# Patient Record
Sex: FEMALE | Race: BLACK OR AFRICAN AMERICAN | Employment: OTHER | URBAN - METROPOLITAN AREA
[De-identification: names, ages, dates, MRNs, and addresses within clinical notes are randomized per-mention and may not be internally consistent; named-entity substitution may affect disease eponyms.]

---

## 2017-01-05 ENCOUNTER — OP HISTORICAL/CONVERTED ENCOUNTER (OUTPATIENT)
Dept: OTHER | Age: 62
End: 2017-01-05

## 2017-04-04 ENCOUNTER — OP HISTORICAL/CONVERTED ENCOUNTER (OUTPATIENT)
Dept: OTHER | Age: 62
End: 2017-04-04

## 2017-12-07 ENCOUNTER — OP HISTORICAL/CONVERTED ENCOUNTER (OUTPATIENT)
Dept: OTHER | Age: 62
End: 2017-12-07

## 2017-12-13 ENCOUNTER — OP HISTORICAL/CONVERTED ENCOUNTER (OUTPATIENT)
Dept: OTHER | Age: 62
End: 2017-12-13

## 2018-04-26 ENCOUNTER — OP HISTORICAL/CONVERTED ENCOUNTER (OUTPATIENT)
Dept: OTHER | Age: 63
End: 2018-04-26

## 2019-12-17 ENCOUNTER — OP HISTORICAL/CONVERTED ENCOUNTER (OUTPATIENT)
Dept: OTHER | Age: 64
End: 2019-12-17

## 2020-12-16 ENCOUNTER — TRANSCRIBE ORDER (OUTPATIENT)
Dept: SCHEDULING | Age: 65
End: 2020-12-16

## 2020-12-16 DIAGNOSIS — Z12.31 BREAST CANCER SCREENING BY MAMMOGRAM: Primary | ICD-10-CM

## 2021-01-29 ENCOUNTER — HOSPITAL ENCOUNTER (OUTPATIENT)
Dept: MAMMOGRAPHY | Age: 66
Discharge: HOME | End: 2021-01-29
Attending: INTERNAL MEDICINE
Payer: MEDICARE

## 2021-01-29 DIAGNOSIS — Z12.31 BREAST CANCER SCREENING BY MAMMOGRAM: ICD-10-CM

## 2021-01-29 PROCEDURE — 77067 SCR MAMMO BI INCL CAD: CPT

## 2021-05-18 ENCOUNTER — HOSPITAL ENCOUNTER (EMERGENCY)
Age: 66
Discharge: ANOTHER HOSPITAL | End: 2021-05-18
Attending: EMERGENCY MEDICINE
Payer: MEDICARE

## 2021-05-18 ENCOUNTER — APPOINTMENT (OUTPATIENT)
Dept: GENERAL RADIOLOGY | Age: 66
End: 2021-05-18
Attending: EMERGENCY MEDICINE
Payer: MEDICARE

## 2021-05-18 ENCOUNTER — HOSPITAL ENCOUNTER (INPATIENT)
Age: 66
LOS: 3 days | Discharge: HOME | DRG: 247 | End: 2021-05-21
Attending: INTERNAL MEDICINE | Admitting: FAMILY MEDICINE
Payer: MEDICARE

## 2021-05-18 VITALS
OXYGEN SATURATION: 99 % | WEIGHT: 158 LBS | RESPIRATION RATE: 20 BRPM | BODY MASS INDEX: 24.8 KG/M2 | TEMPERATURE: 97.4 F | SYSTOLIC BLOOD PRESSURE: 193 MMHG | HEIGHT: 67 IN | HEART RATE: 72 BPM | DIASTOLIC BLOOD PRESSURE: 90 MMHG

## 2021-05-18 DIAGNOSIS — I21.3 ACUTE ST ELEVATION MYOCARDIAL INFARCTION (STEMI), UNSPECIFIED ARTERY (HCC): Primary | ICD-10-CM

## 2021-05-18 DIAGNOSIS — I21.11 ST ELEVATION MYOCARDIAL INFARCTION INVOLVING RIGHT CORONARY ARTERY (HCC): ICD-10-CM

## 2021-05-18 PROBLEM — I21.19 STEMI INVOLVING OTH CORONARY ARTERY OF INFERIOR WALL (HCC): Status: ACTIVE | Noted: 2021-05-18

## 2021-05-18 LAB
ALBUMIN SERPL-MCNC: 3.8 G/DL (ref 3.5–5)
ALBUMIN/GLOB SERPL: 0.8 {RATIO} (ref 1.1–2.2)
ALP SERPL-CCNC: 153 U/L (ref 45–117)
ALT SERPL-CCNC: 22 U/L (ref 12–78)
ANION GAP SERPL CALC-SCNC: 7 MMOL/L (ref 5–15)
AST SERPL W P-5'-P-CCNC: 38 U/L (ref 15–37)
BASOPHILS # BLD: 0 K/UL (ref 0–0.2)
BASOPHILS NFR BLD: 0 % (ref 0–2.5)
BILIRUB SERPL-MCNC: 0.7 MG/DL (ref 0.2–1)
BUN SERPL-MCNC: 17 MG/DL (ref 6–20)
BUN/CREAT SERPL: 14 (ref 12–20)
CA-I BLD-MCNC: 9.3 MG/DL (ref 8.5–10.1)
CHLORIDE SERPL-SCNC: 103 MMOL/L (ref 97–108)
CO2 SERPL-SCNC: 29 MMOL/L (ref 21–32)
CREAT SERPL-MCNC: 1.23 MG/DL (ref 0.55–1.02)
EOSINOPHIL # BLD: 0 K/UL (ref 0–0.7)
EOSINOPHIL NFR BLD: 0 % (ref 0.9–2.9)
ERYTHROCYTE [DISTWIDTH] IN BLOOD BY AUTOMATED COUNT: 14.1 % (ref 11.5–14.5)
GLOBULIN SER CALC-MCNC: 4.6 G/DL (ref 2–4)
GLUCOSE BLD STRIP.AUTO-MCNC: 211 MG/DL (ref 65–117)
GLUCOSE SERPL-MCNC: 268 MG/DL (ref 65–100)
HCT VFR BLD AUTO: 37.9 % (ref 36–46)
HGB BLD-MCNC: 12.3 G/DL (ref 13.5–17.5)
INR PPP: 1 (ref 0.9–1.1)
LYMPHOCYTES # BLD: 1 K/UL (ref 1–4.8)
LYMPHOCYTES NFR BLD: 10 % (ref 20.5–51.1)
MCH RBC QN AUTO: 28.2 PG (ref 31–34)
MCHC RBC AUTO-ENTMCNC: 32.5 G/DL (ref 31–36)
MCV RBC AUTO: 86.5 FL (ref 80–100)
MONOCYTES # BLD: 0.4 K/UL (ref 0.2–2.4)
MONOCYTES NFR BLD: 4 % (ref 1.7–9.3)
MRSA DNA SPEC QL NAA+PROBE: NOT DETECTED
NEUTS SEG # BLD: 9.5 K/UL (ref 1.8–7.7)
NEUTS SEG NFR BLD: 86 % (ref 42–75)
NRBC # BLD: 0 K/UL
NRBC BLD-RTO: 0 PER 100 WBC
PERFORMED BY, TECHID: ABNORMAL
PLATELET # BLD AUTO: 293 K/UL (ref 150–400)
PMV BLD AUTO: 8.8 FL (ref 6.5–11.5)
POTASSIUM SERPL-SCNC: 3.9 MMOL/L (ref 3.5–5.1)
PROT SERPL-MCNC: 8.4 G/DL (ref 6.4–8.2)
PROTHROMBIN TIME: 9.9 SEC (ref 9–11.1)
RBC # BLD AUTO: 4.38 M/UL (ref 4.5–5.9)
SODIUM SERPL-SCNC: 139 MMOL/L (ref 136–145)
TROPONIN I SERPL-MCNC: <0.05 NG/ML
WBC # BLD AUTO: 11 K/UL (ref 4.4–11.3)

## 2021-05-18 PROCEDURE — 74011000636 HC RX REV CODE- 636: Performed by: INTERNAL MEDICINE

## 2021-05-18 PROCEDURE — 77030016700 HC CATH ANGI DX INFN2 CARD -B: Performed by: INTERNAL MEDICINE

## 2021-05-18 PROCEDURE — 96374 THER/PROPH/DIAG INJ IV PUSH: CPT

## 2021-05-18 PROCEDURE — C1874 STENT, COATED/COV W/DEL SYS: HCPCS | Performed by: INTERNAL MEDICINE

## 2021-05-18 PROCEDURE — 74011250637 HC RX REV CODE- 250/637: Performed by: EMERGENCY MEDICINE

## 2021-05-18 PROCEDURE — 84484 ASSAY OF TROPONIN QUANT: CPT

## 2021-05-18 PROCEDURE — 77030013516 HC DEV INFL ANGI MRTM -B: Performed by: INTERNAL MEDICINE

## 2021-05-18 PROCEDURE — 74011250636 HC RX REV CODE- 250/636

## 2021-05-18 PROCEDURE — C1887 CATHETER, GUIDING: HCPCS | Performed by: INTERNAL MEDICINE

## 2021-05-18 PROCEDURE — 92941 PRQ TRLML REVSC TOT OCCL AMI: CPT | Performed by: INTERNAL MEDICINE

## 2021-05-18 PROCEDURE — C1769 GUIDE WIRE: HCPCS | Performed by: INTERNAL MEDICINE

## 2021-05-18 PROCEDURE — 74011250636 HC RX REV CODE- 250/636: Performed by: EMERGENCY MEDICINE

## 2021-05-18 PROCEDURE — 74011250637 HC RX REV CODE- 250/637

## 2021-05-18 PROCEDURE — 93458 L HRT ARTERY/VENTRICLE ANGIO: CPT | Performed by: INTERNAL MEDICINE

## 2021-05-18 PROCEDURE — 75810000275 HC EMERGENCY DEPT VISIT NO LEVEL OF CARE

## 2021-05-18 PROCEDURE — 99153 MOD SED SAME PHYS/QHP EA: CPT | Performed by: INTERNAL MEDICINE

## 2021-05-18 PROCEDURE — 77030008542 HC TBNG MON PRSS EDWD -A: Performed by: INTERNAL MEDICINE

## 2021-05-18 PROCEDURE — 71045 X-RAY EXAM CHEST 1 VIEW: CPT

## 2021-05-18 PROCEDURE — 77030003394 HC NDL ART COOK -A: Performed by: INTERNAL MEDICINE

## 2021-05-18 PROCEDURE — C1725 CATH, TRANSLUMIN NON-LASER: HCPCS | Performed by: INTERNAL MEDICINE

## 2021-05-18 PROCEDURE — 4A023N7 MEASUREMENT OF CARDIAC SAMPLING AND PRESSURE, LEFT HEART, PERCUTANEOUS APPROACH: ICD-10-PCS | Performed by: INTERNAL MEDICINE

## 2021-05-18 PROCEDURE — C1894 INTRO/SHEATH, NON-LASER: HCPCS | Performed by: INTERNAL MEDICINE

## 2021-05-18 PROCEDURE — 74011250636 HC RX REV CODE- 250/636: Performed by: INTERNAL MEDICINE

## 2021-05-18 PROCEDURE — 4A033BC MEASUREMENT OF ARTERIAL PRESSURE, CORONARY, PERCUTANEOUS APPROACH: ICD-10-PCS | Performed by: INTERNAL MEDICINE

## 2021-05-18 PROCEDURE — 80053 COMPREHEN METABOLIC PANEL: CPT

## 2021-05-18 PROCEDURE — 99284 EMERGENCY DEPT VISIT MOD MDM: CPT

## 2021-05-18 PROCEDURE — 87641 MR-STAPH DNA AMP PROBE: CPT

## 2021-05-18 PROCEDURE — 82962 GLUCOSE BLOOD TEST: CPT

## 2021-05-18 PROCEDURE — 93005 ELECTROCARDIOGRAM TRACING: CPT

## 2021-05-18 PROCEDURE — 36415 COLL VENOUS BLD VENIPUNCTURE: CPT

## 2021-05-18 PROCEDURE — 99152 MOD SED SAME PHYS/QHP 5/>YRS: CPT | Performed by: INTERNAL MEDICINE

## 2021-05-18 PROCEDURE — 65610000006 HC RM INTENSIVE CARE

## 2021-05-18 PROCEDURE — 74011000250 HC RX REV CODE- 250: Performed by: INTERNAL MEDICINE

## 2021-05-18 PROCEDURE — 74011000258 HC RX REV CODE- 258: Performed by: INTERNAL MEDICINE

## 2021-05-18 PROCEDURE — 96375 TX/PRO/DX INJ NEW DRUG ADDON: CPT

## 2021-05-18 PROCEDURE — 027034Z DILATION OF CORONARY ARTERY, ONE ARTERY WITH DRUG-ELUTING INTRALUMINAL DEVICE, PERCUTANEOUS APPROACH: ICD-10-PCS | Performed by: INTERNAL MEDICINE

## 2021-05-18 PROCEDURE — 77030025703 HC SYR ANGI VACLOK MRTM -A: Performed by: INTERNAL MEDICINE

## 2021-05-18 PROCEDURE — 85025 COMPLETE CBC W/AUTO DIFF WBC: CPT

## 2021-05-18 PROCEDURE — 85610 PROTHROMBIN TIME: CPT

## 2021-05-18 DEVICE — STENT RONYX30026UX RESOLUTE ONYX 3.00X26
Type: IMPLANTABLE DEVICE | Status: FUNCTIONAL
Brand: RESOLUTE ONYX™

## 2021-05-18 RX ORDER — HEPARIN SODIUM 1000 [USP'U]/ML
INJECTION, SOLUTION INTRAVENOUS; SUBCUTANEOUS AS NEEDED
Status: DISCONTINUED | OUTPATIENT
Start: 2021-05-18 | End: 2021-05-18 | Stop reason: HOSPADM

## 2021-05-18 RX ORDER — FENTANYL CITRATE 50 UG/ML
INJECTION, SOLUTION INTRAMUSCULAR; INTRAVENOUS AS NEEDED
Status: DISCONTINUED | OUTPATIENT
Start: 2021-05-18 | End: 2021-05-18 | Stop reason: HOSPADM

## 2021-05-18 RX ORDER — HEPARIN SODIUM 200 [USP'U]/100ML
INJECTION, SOLUTION INTRAVENOUS
Status: COMPLETED | OUTPATIENT
Start: 2021-05-18 | End: 2021-05-18

## 2021-05-18 RX ORDER — METOPROLOL TARTRATE 5 MG/5ML
INJECTION INTRAVENOUS AS NEEDED
Status: DISCONTINUED | OUTPATIENT
Start: 2021-05-18 | End: 2021-05-18 | Stop reason: HOSPADM

## 2021-05-18 RX ORDER — NITROGLYCERIN 0.4 MG/1
0.4 TABLET SUBLINGUAL
Status: COMPLETED | OUTPATIENT
Start: 2021-05-18 | End: 2021-05-18

## 2021-05-18 RX ORDER — HEPARIN SODIUM 5000 [USP'U]/ML
4000 INJECTION, SOLUTION INTRAVENOUS; SUBCUTANEOUS
Status: COMPLETED | OUTPATIENT
Start: 2021-05-18 | End: 2021-05-18

## 2021-05-18 RX ORDER — INSULIN LISPRO 100 [IU]/ML
INJECTION, SOLUTION INTRAVENOUS; SUBCUTANEOUS
Status: DISCONTINUED | OUTPATIENT
Start: 2021-05-19 | End: 2021-05-21 | Stop reason: HOSPADM

## 2021-05-18 RX ORDER — MAGNESIUM SULFATE 100 %
4 CRYSTALS MISCELLANEOUS AS NEEDED
Status: DISCONTINUED | OUTPATIENT
Start: 2021-05-18 | End: 2021-05-21 | Stop reason: HOSPADM

## 2021-05-18 RX ORDER — HEPARIN SODIUM 5000 [USP'U]/ML
INJECTION, SOLUTION INTRAVENOUS; SUBCUTANEOUS
Status: COMPLETED
Start: 2021-05-18 | End: 2021-05-18

## 2021-05-18 RX ORDER — DEXTROSE 50 % IN WATER (D50W) INTRAVENOUS SYRINGE
25-50 AS NEEDED
Status: DISCONTINUED | OUTPATIENT
Start: 2021-05-18 | End: 2021-05-21 | Stop reason: HOSPADM

## 2021-05-18 RX ORDER — ATORVASTATIN CALCIUM 40 MG/1
TABLET, FILM COATED ORAL
Status: COMPLETED
Start: 2021-05-18 | End: 2021-05-18

## 2021-05-18 RX ORDER — ATORVASTATIN CALCIUM 40 MG/1
80 TABLET, FILM COATED ORAL DAILY
Status: DISCONTINUED | OUTPATIENT
Start: 2021-05-19 | End: 2021-05-18

## 2021-05-18 RX ORDER — GUAIFENESIN 100 MG/5ML
81 LIQUID (ML) ORAL DAILY
Status: DISCONTINUED | OUTPATIENT
Start: 2021-05-19 | End: 2021-05-21 | Stop reason: HOSPADM

## 2021-05-18 RX ORDER — ATORVASTATIN CALCIUM 40 MG/1
80 TABLET, FILM COATED ORAL
Status: COMPLETED | OUTPATIENT
Start: 2021-05-18 | End: 2021-05-18

## 2021-05-18 RX ORDER — ONDANSETRON 2 MG/ML
4 INJECTION INTRAMUSCULAR; INTRAVENOUS ONCE
Status: COMPLETED | OUTPATIENT
Start: 2021-05-18 | End: 2021-05-18

## 2021-05-18 RX ORDER — GUAIFENESIN 100 MG/5ML
81 LIQUID (ML) ORAL
Status: COMPLETED | OUTPATIENT
Start: 2021-05-18 | End: 2021-05-18

## 2021-05-18 RX ORDER — LIDOCAINE HYDROCHLORIDE 10 MG/ML
INJECTION INFILTRATION; PERINEURAL AS NEEDED
Status: DISCONTINUED | OUTPATIENT
Start: 2021-05-18 | End: 2021-05-18 | Stop reason: HOSPADM

## 2021-05-18 RX ORDER — GUAIFENESIN 100 MG/5ML
LIQUID (ML) ORAL
Status: COMPLETED
Start: 2021-05-18 | End: 2021-05-18

## 2021-05-18 RX ORDER — GUAIFENESIN 100 MG/5ML
243 LIQUID (ML) ORAL
Status: COMPLETED | OUTPATIENT
Start: 2021-05-18 | End: 2021-05-18

## 2021-05-18 RX ORDER — METOPROLOL SUCCINATE 25 MG/1
25 TABLET, EXTENDED RELEASE ORAL DAILY
Status: DISCONTINUED | OUTPATIENT
Start: 2021-05-19 | End: 2021-05-21 | Stop reason: HOSPADM

## 2021-05-18 RX ORDER — ATORVASTATIN CALCIUM 40 MG/1
80 TABLET, FILM COATED ORAL DAILY
Status: DISCONTINUED | OUTPATIENT
Start: 2021-05-19 | End: 2021-05-21 | Stop reason: HOSPADM

## 2021-05-18 RX ORDER — HEPARIN SODIUM 10000 [USP'U]/100ML
12 INJECTION, SOLUTION INTRAVENOUS
Status: COMPLETED | OUTPATIENT
Start: 2021-05-18 | End: 2021-05-18

## 2021-05-18 RX ADMIN — HEPARIN SODIUM 4000 UNITS: 5000 INJECTION INTRAVENOUS; SUBCUTANEOUS at 17:55

## 2021-05-18 RX ADMIN — Medication 81 MG: at 17:50

## 2021-05-18 RX ADMIN — NITROGLYCERIN 1 INCH: 20 OINTMENT TOPICAL at 17:45

## 2021-05-18 RX ADMIN — ATORVASTATIN CALCIUM 40 MG: 40 TABLET, FILM COATED ORAL at 17:51

## 2021-05-18 RX ADMIN — NITROGLYCERIN 0.4 MG: 0.4 TABLET, ORALLY DISINTEGRATING SUBLINGUAL at 17:45

## 2021-05-18 RX ADMIN — HEPARIN SODIUM 4000 UNITS: 5000 INJECTION, SOLUTION INTRAVENOUS; SUBCUTANEOUS at 17:55

## 2021-05-18 RX ADMIN — HEPARIN SODIUM 860.4 UNITS/HR: 10000 INJECTION, SOLUTION INTRAVENOUS at 17:57

## 2021-05-18 RX ADMIN — ATORVASTATIN CALCIUM 40 MG: 40 TABLET, FILM COATED ORAL at 18:16

## 2021-05-18 RX ADMIN — ASPIRIN 81 MG: 81 TABLET, CHEWABLE ORAL at 17:50

## 2021-05-18 RX ADMIN — ONDANSETRON 4 MG: 2 INJECTION INTRAMUSCULAR; INTRAVENOUS at 17:53

## 2021-05-18 RX ADMIN — TICAGRELOR 180 MG: 90 TABLET ORAL at 17:55

## 2021-05-18 RX ADMIN — ASPIRIN 243 MG: 81 TABLET, CHEWABLE ORAL at 18:23

## 2021-05-18 NOTE — Clinical Note
Sheath #1: Sheath: removed. Hemostasis achieved. Upon evaluation of the common femoral artery stick using fluoroscopy, the access site puncture was within the safe zone.  Vasc band with 11 ml air

## 2021-05-18 NOTE — Clinical Note
Right groin and right radial clipped and draped. Wet prep solution applied at: 1910. Wet prep solution dried at: 1913. Wet prep elapsed drying time: 3 mins.

## 2021-05-18 NOTE — ED TRIAGE NOTES
Patient came from [facility] for STEMI Cath team awaiting patient in cath lab patient taken straight to cath lab

## 2021-05-18 NOTE — ED PROVIDER NOTES
EMERGENCY DEPARTMENT HISTORY AND PHYSICAL EXAM      Date: 5/18/2021  Patient Name: Dayton Gonzales      History of Presenting Illness     Chief Complaint   Patient presents with    Chest Pain     pt presents with chest pain and left arm pain that has been ongoing for 2 hrs, pt states that she was out working in yard and thinks that she got too hot. Pt ambulatory to triage w/o difficulty. Pt VS stable, pt rates pain 5/10, denies pmh of heart issues, pt BP in tirage 202/84       History Provided By: Patient    HPI: Dayton Gonzales, 78 y.o. female with a past medical history significant hypertension presents to the ED with cc of chest pain beginning at 1500 today while working in the yard. Initially felt very hot developed left-sided chest pressure radiating to left shoulder and arm mild dyspnea. Vomited once prior to arrival.  No syncope or palpitations. No previous history of heart disease    There are no other complaints, changes, or physical findings at this time. PCP: Danelle Casillas MD    Current Facility-Administered Medications   Medication Dose Route Frequency Provider Last Rate Last Admin    atorvastatin (LIPITOR) 40 mg tablet             aspirin 81 mg chewable tablet             heparin (porcine) 5,000 unit/mL injection             ticagrelor (BRILINTA) 90 mg tablet             nitroglycerin (NITROBID) 2 % ointment 1 Inch  1 Inch Topical NOW Lakshmi Pritchett MD        nitroglycerin (NITROSTAT) tablet 0.4 mg  0.4 mg SubLINGual NOW Lakshmi Pritchett MD           Past History     Past Medical History:  Past Medical History:   Diagnosis Date    Hypertension     Menopause        Past Surgical History:  History reviewed. No pertinent surgical history. Family History:  History reviewed. No pertinent family history.     Social History:  Social History     Tobacco Use    Smoking status: Never Smoker    Smokeless tobacco: Never Used   Substance Use Topics    Alcohol use: Not on file    Drug use: Not on file       Allergies:  No Known Allergies      Review of Systems   Review of all other systems negative  Review of Systems    Physical Exam   Pleasant AA female appears diaphoretic uncomfortable no immediate distress  Physical Exam  Vitals signs and nursing note reviewed. Constitutional:       General: She is not in acute distress. Appearance: Normal appearance. She is diaphoretic. She is not ill-appearing or toxic-appearing. HENT:      Head: Normocephalic and atraumatic. Right Ear: Tympanic membrane normal.      Left Ear: Tympanic membrane normal.      Nose: Nose normal.      Mouth/Throat:      Mouth: Mucous membranes are moist.   Eyes:      Extraocular Movements: Extraocular movements intact. Conjunctiva/sclera: Conjunctivae normal.      Pupils: Pupils are equal, round, and reactive to light. Neck:      Musculoskeletal: Normal range of motion and neck supple. No neck rigidity or muscular tenderness. Cardiovascular:      Rate and Rhythm: Normal rate and regular rhythm. Pulses: Normal pulses. Carotid pulses are 2+ on the right side and 2+ on the left side. Radial pulses are 2+ on the right side and 2+ on the left side. Dorsalis pedis pulses are 2+ on the right side and 2+ on the left side. Posterior tibial pulses are 2+ on the right side and 2+ on the left side. Heart sounds: Normal heart sounds. No murmur. No systolic murmur. No diastolic murmur. Pulmonary:      Effort: Pulmonary effort is normal. No respiratory distress. Breath sounds: Normal breath sounds. No decreased breath sounds, wheezing, rhonchi or rales. Chest:      Chest wall: No tenderness. Abdominal:      General: Abdomen is flat. Palpations: There is no mass. Tenderness: There is no abdominal tenderness. There is no right CVA tenderness, left CVA tenderness, guarding or rebound. Hernia: No hernia is present. Musculoskeletal: Normal range of motion. General: No tenderness, deformity or signs of injury. Right lower leg: She exhibits no tenderness. No edema. Left lower leg: She exhibits no tenderness. No edema. Skin:     General: Skin is warm. Findings: No erythema or rash. Neurological:      General: No focal deficit present. Mental Status: She is alert and oriented to person, place, and time. Cranial Nerves: No cranial nerve deficit. Sensory: No sensory deficit. Motor: No weakness. Psychiatric:         Mood and Affect: Mood normal.         Behavior: Behavior normal.         Thought Content: Thought content normal.         Lab and Diagnostic Study Results     Labs -     Recent Results (from the past 12 hour(s))   EKG, 12 LEAD, INITIAL    Collection Time: 05/18/21  5:28 PM   Result Value Ref Range    Ventricular Rate 67 BPM    Atrial Rate 67 BPM    P-R Interval 182 ms    QRS Duration 80 ms    Q-T Interval 398 ms    QTC Calculation (Bezet) 420 ms    Calculated P Axis 40 degrees    Calculated R Axis 41 degrees    Calculated T Axis 106 degrees    Diagnosis       Sinus rhythm  Inferior infarct, acute (RCA)  Probable anterior infarct, old  Lateral leads are also involved  Probable RV involvement, suggest recording right precordial leads  >>> Acute MI <<<         Radiologic Studies -   [unfilled]  CT Results  (Last 48 hours)    None        CXR Results  (Last 48 hours)    None          Medical Decision Making and ED Course   - I am the first and primary provider for this patient AND AM THE PRIMARY PROVIDER OF RECORD. - I reviewed the vital signs, available nursing notes, past medical history, past surgical history, family history and social history. - Initial assessment performed. The patients presenting problems have been discussed, and the staff are in agreement with the care plan formulated and outlined with them. I have encouraged them to ask questions as they arise throughout their visit.     Vital Signs-Reviewed the patient's vital signs. Patient Vitals for the past 12 hrs:   Temp Pulse Resp BP SpO2   05/18/21 1725 97.4 °F (36.3 °C) 68 20 (!) 202/84 96 %       EKG interpretation: (Preliminary): Performed at 1728, and read at 1728  Rhythm: normal sinus rhythm; and regular . Rate (approx.): 67; Axis: normal; [place] interval: normal; QRS interval: normal ; ST/T wave: elevated ; Other findings: Acute inferior MI possible old anteroseptal MI subtle; possible right ventricular involvement. Records Reviewed: Nursing Notes and Old Medical Records    The patient presents with chest pain with a differential diagnosis of  abnormal EKG, ACS, arrhythmia, acute MI, angina, aortic dissection, chest wall pain, pericarditis, pnuemothorax and pnuemonia    ED Course:              Provider Notes (Medical Decision Making):   70-year-old female developed chest pain approximately 1500 while working in the yard EKG on arrival shows acute inferior MI STEMI protocol begun we will plan on transfer to facility with Cath Lab.    1755: Pain is relieved patient vomited x1 blood pressure 158/80 heart rate remained 68 sinus rhythm without ectopy. MDM           Consultations:       Consultations: - Cardiology Consult: Dr. Marcella Cabrera. We have asked for emergent assistance with regard to this patient. We have discussed the patients HPI, ROS, PE and results this far. They will come and evaluate the patient for admission with or without emergent diagnostics and intervention.         Procedures and Critical Care       Performed by: Bekah Chapman MD  PROCEDURES:  Procedures               CRITICAL CARE NOTE :  5:42 PM  Amount of Critical Care Time: 60(minutes)    IMPENDING DETERIORATION -Respiratory and Cardiovascular  ASSOCIATED RISK FACTORS - Hypotension, Shock, Hypoxia, Dysrhythmia and Vascular Compromise  MANAGEMENT- Bedside Assessment  INTERPRETATION -  Xrays, ECG and Blood Pressure  INTERVENTIONS - hemodynamic mngmt, vascular control and Metobolic interventions  CASE REVIEW - Medical Sub-Specialist and cardiology interventional [place] [facility]  TREATMENT RESPONSE -Improved  PERFORMED BY - Self    NOTES   :  I have spent critical care time involved in lab review, consultations with specialist, family decision- making, bedside attention and documentation. This time excludes time spent in any separate billed procedures. During this entire length of time I was immediately available to the patient . Lan Raymundo MD        Disposition     Disposition: Transferred to [facility] patient verbally agreed to transfer and understand the risks involved as outlined in the EMTALA form. Remove if not discharged  DISCHARGE PLAN:  1. There are no discharge medications for this patient. 2.   Follow-up Information    None       3. Return to ED if worse   4. There are no discharge medications for this patient. Diagnosis     Clinical Impression: Acute inferior MI  Attestations:    Lan Raymundo MD    Please note that this dictation was completed with [employer], the computer voice recognition software. Quite often unanticipated grammatical, syntax, homophones, and other interpretive errors are inadvertently transcribed by the computer software. Please disregard these errors. Please excuse any errors that have escaped final proofreading. Thank you.

## 2021-05-18 NOTE — Clinical Note
Lesion: Located in the Mid RCA. Stent inserted. Multiple inflations used. First inflation pressure = 12 brandi; inflation time: 10 sec. Second inflation pressure: 12 brandi; inflation time: 15 sec.

## 2021-05-18 NOTE — Clinical Note
TRANSFER - OUT REPORT:     Verbal report given to: (at bedside). Report consisted of patient's Situation, Background, Assessment and   Recommendations(SBAR). Opportunity for questions and clarification was provided. Patient transported with a Registered Nurse, [address] [place] [address] / Patient Care Tech and Monitor. Patient transported to: ICU, 269.

## 2021-05-18 NOTE — ED TRIAGE NOTES
.  Chief Complaint   Patient presents with    Chest Pain     pt presents with chest pain and left arm pain that has been ongoing for 2 hrs, pt states that she was out working in yard and thinks that she got too hot. Pt ambulatory to triage w/o difficulty.  Pt VS stable, pt rates pain 5/10, denies pmh of heart issues, pt BP in tirage 202/84

## 2021-05-18 NOTE — Clinical Note
Lesion located in the Mid RCA. Balloon inserted. Balloon inflated using multiple inflation technique. Lesion #1: Pressure = 4 brandi; Duration = 6 sec. Inflation 2: Pressure = 6 brandi; Duration = 7 sec. Inflation 3: Pressure = 8 brandi; Duration = 11 sec. Angiography after 1st and 3rd inflations.

## 2021-05-19 ENCOUNTER — APPOINTMENT (OUTPATIENT)
Dept: NON INVASIVE DIAGNOSTICS | Age: 66
DRG: 247 | End: 2021-05-19
Attending: INTERNAL MEDICINE
Payer: MEDICARE

## 2021-05-19 ENCOUNTER — APPOINTMENT (OUTPATIENT)
Dept: GENERAL RADIOLOGY | Age: 66
DRG: 247 | End: 2021-05-19
Attending: FAMILY MEDICINE
Payer: MEDICARE

## 2021-05-19 LAB
ALBUMIN SERPL-MCNC: 3.6 G/DL (ref 3.5–5)
ALBUMIN/GLOB SERPL: 0.8 {RATIO} (ref 1.1–2.2)
ALP SERPL-CCNC: 146 U/L (ref 45–117)
ALT SERPL-CCNC: 27 U/L (ref 12–78)
ANION GAP SERPL CALC-SCNC: 5 MMOL/L (ref 5–15)
AST SERPL W P-5'-P-CCNC: 140 U/L (ref 15–37)
ATRIAL RATE: 67 BPM
BILIRUB SERPL-MCNC: 1 MG/DL (ref 0.2–1)
BUN SERPL-MCNC: 16 MG/DL (ref 6–20)
BUN/CREAT SERPL: 17 (ref 12–20)
CA-I BLD-MCNC: 9.2 MG/DL (ref 8.5–10.1)
CALCULATED P AXIS, ECG09: 40 DEGREES
CALCULATED R AXIS, ECG10: 41 DEGREES
CALCULATED T AXIS, ECG11: 106 DEGREES
CHLORIDE SERPL-SCNC: 103 MMOL/L (ref 97–108)
CHOLEST SERPL-MCNC: 187 MG/DL
CO2 SERPL-SCNC: 28 MMOL/L (ref 21–32)
CREAT SERPL-MCNC: 0.96 MG/DL (ref 0.55–1.02)
DIAGNOSIS, 93000: NORMAL
ECHO AO ROOT DIAM: 3.2 CM
ECHO AV PEAK GRADIENT: 5 MMHG
ECHO EST RA PRESSURE: 3 MMHG
ECHO LA AREA 4C: 11.87 CM2
ECHO LA MAJOR AXIS: 2.7 CM
ECHO LA MINOR AXIS: 1.48 CM
ECHO LV E' SEPTAL VELOCITY: 3.51 CM/S
ECHO LV EDV A2C: 84.6 CM3
ECHO LV EJECTION FRACTION BIPLANE: 65.8 % (ref 55–100)
ECHO LV ESV A2C: 22.2 CM3
ECHO LV INTERNAL DIMENSION DIASTOLIC: 4.39 CM (ref 3.9–5.3)
ECHO LV INTERNAL DIMENSION SYSTOLIC: 2.81 CM
ECHO LV IVSD: 1.45 CM (ref 0.6–0.9)
ECHO LV MASS 2D: 208.3 G (ref 67–162)
ECHO LV MASS INDEX 2D: 113.8 G/M2 (ref 43–95)
ECHO LV POSTERIOR WALL DIASTOLIC: 1.1 CM (ref 0.6–0.9)
ECHO LVOT PEAK GRADIENT: 2 MMHG
ECHO MV A VELOCITY: 111 CM/S
ECHO MV AREA PHT: 1.62 CM2
ECHO MV E DECELERATION TIME (DT): 444 MS
ECHO MV E VELOCITY: 58 CM/S
ECHO MV E/A RATIO: 0.52
ECHO MV E/E' SEPTAL: 16.52
ECHO MV PRESSURE HALF TIME (PHT): 136 MS
ECHO PV PEAK INSTANTANEOUS GRADIENT SYSTOLIC: 2 MMHG
ECHO PV REGURGITANT MAX VELOCITY: 115 CM/S
ECHO PV REGURGITANT MAX VELOCITY: 151 CM/S
ECHO PV REGURGITANT MAX VELOCITY: 204 CM/S
ECHO PV REGURGITANT MAX VELOCITY: 65.2 CM/S
ECHO PV REGURGITANT MAX VELOCITY: 78.4 CM/S
ECHO PVEIN A DURATION: 77 MS
ECHO PVEIN A VELOCITY: 26.7 CM/S
ECHO RIGHT VENTRICULAR SYSTOLIC PRESSURE (RVSP): 20 MMHG
ECHO RV INTERNAL DIMENSION: 3.04 CM
ECHO TV MEAN GRADIENT: 17 MMHG
GLOBULIN SER CALC-MCNC: 4.4 G/DL (ref 2–4)
GLUCOSE BLD STRIP.AUTO-MCNC: 146 MG/DL (ref 65–117)
GLUCOSE BLD STRIP.AUTO-MCNC: 163 MG/DL (ref 65–117)
GLUCOSE BLD STRIP.AUTO-MCNC: 196 MG/DL (ref 65–117)
GLUCOSE SERPL-MCNC: 168 MG/DL (ref 65–100)
HDLC SERPL-MCNC: 69 MG/DL
HDLC SERPL: 2.7 {RATIO} (ref 0–5)
LDLC SERPL CALC-MCNC: 105.6 MG/DL (ref 0–100)
LIPID PROFILE,FLP: ABNORMAL
MV DEC SLOPE: 1380 MM/S2
MV DEC SLOPE: 1380 MM/S2
P-R INTERVAL, ECG05: 182 MS
PERFORMED BY, TECHID: ABNORMAL
POTASSIUM SERPL-SCNC: 4.2 MMOL/L (ref 3.5–5.1)
PROT SERPL-MCNC: 8 G/DL (ref 6.4–8.2)
Q-T INTERVAL, ECG07: 398 MS
QRS DURATION, ECG06: 80 MS
QTC CALCULATION (BEZET), ECG08: 420 MS
SODIUM SERPL-SCNC: 136 MMOL/L (ref 136–145)
TRIGL SERPL-MCNC: 62 MG/DL (ref ?–150)
TROPONIN I SERPL-MCNC: 0.69 NG/ML
TSH SERPL DL<=0.05 MIU/L-ACNC: 1.09 UIU/ML (ref 0.36–3.74)
VENTRICULAR RATE, ECG03: 67 BPM
VLDLC SERPL CALC-MCNC: 12.4 MG/DL

## 2021-05-19 PROCEDURE — 82962 GLUCOSE BLOOD TEST: CPT

## 2021-05-19 PROCEDURE — 36415 COLL VENOUS BLD VENIPUNCTURE: CPT

## 2021-05-19 PROCEDURE — 74011636637 HC RX REV CODE- 636/637: Performed by: INTERNAL MEDICINE

## 2021-05-19 PROCEDURE — 80061 LIPID PANEL: CPT

## 2021-05-19 PROCEDURE — 80053 COMPREHEN METABOLIC PANEL: CPT

## 2021-05-19 PROCEDURE — 84484 ASSAY OF TROPONIN QUANT: CPT

## 2021-05-19 PROCEDURE — 71045 X-RAY EXAM CHEST 1 VIEW: CPT

## 2021-05-19 PROCEDURE — 74011250637 HC RX REV CODE- 250/637: Performed by: INTERNAL MEDICINE

## 2021-05-19 PROCEDURE — 65270000029 HC RM PRIVATE

## 2021-05-19 PROCEDURE — 84443 ASSAY THYROID STIM HORMONE: CPT

## 2021-05-19 PROCEDURE — 93306 TTE W/DOPPLER COMPLETE: CPT

## 2021-05-19 PROCEDURE — 83036 HEMOGLOBIN GLYCOSYLATED A1C: CPT

## 2021-05-19 RX ORDER — LOSARTAN POTASSIUM 25 MG/1
25 TABLET ORAL DAILY
Status: DISCONTINUED | OUTPATIENT
Start: 2021-05-19 | End: 2021-05-21 | Stop reason: HOSPADM

## 2021-05-19 RX ADMIN — TICAGRELOR 90 MG: 90 TABLET ORAL at 20:07

## 2021-05-19 RX ADMIN — LOSARTAN POTASSIUM 25 MG: 50 TABLET, FILM COATED ORAL at 11:50

## 2021-05-19 RX ADMIN — INSULIN LISPRO 3 UNITS: 100 INJECTION, SOLUTION INTRAVENOUS; SUBCUTANEOUS at 17:19

## 2021-05-19 RX ADMIN — INSULIN LISPRO 2 UNITS: 100 INJECTION, SOLUTION INTRAVENOUS; SUBCUTANEOUS at 08:14

## 2021-05-19 RX ADMIN — ASPIRIN 81 MG CHEWABLE TABLET 81 MG: 81 TABLET CHEWABLE at 08:13

## 2021-05-19 RX ADMIN — METOPROLOL SUCCINATE 25 MG: 25 TABLET, EXTENDED RELEASE ORAL at 08:13

## 2021-05-19 RX ADMIN — INSULIN LISPRO 2 UNITS: 100 INJECTION, SOLUTION INTRAVENOUS; SUBCUTANEOUS at 11:50

## 2021-05-19 RX ADMIN — TICAGRELOR 90 MG: 90 TABLET ORAL at 08:13

## 2021-05-19 RX ADMIN — ATORVASTATIN CALCIUM 80 MG: 40 TABLET, FILM COATED ORAL at 08:13

## 2021-05-19 NOTE — PROGRESS NOTES
Progress Note    Patient: Osvaldo Mccarthy MRN: [id number]  SSN: [id number]    YOB: 1955  Age: 78 y.o. Sex: female      Admit Date: 5/18/2021    LOS: 1 day     Subjective:     No acute events overnight. She denied having any chest pain or shortness of breath. Objective:     Vitals:    05/18/21 2050 05/18/21 2052 05/18/21 2110 05/18/21 2315   BP: (!) 156/84  (!) 166/87 (!) 153/76   Pulse: 82  80 76   Resp: 21 17 16   Temp: 98.2 °F (36.8 °C)   97.6 °F (36.4 °C)   SpO2: 98% 97% 99% 98%   Weight: 72 kg (158 lb 11.7 oz)      Height: 5' 7\" (1.702 m)           Intake and Output:  Current Shift: No intake/output data recorded. Last three shifts: No intake/output data recorded. Physical Exam:   General:  Alert, cooperative, no distress, appears stated age. Eyes:  Conjunctivae/corneas clear. PERRL, EOMs intact. Fundi benign   Ears:  Normal TMs and external ear canals both ears. Nose: Nares normal. Septum midline. Mucosa normal. No drainage or sinus tenderness. Mouth/Throat: Lips, mucosa, and tongue normal. Teeth and gums normal.   Neck: Supple, symmetrical, trachea midline, no adenopathy, thyroid: no enlargment/tenderness/nodules, no carotid bruit and no JVD. Back:   Symmetric, no curvature. ROM normal. No CVA tenderness. Lungs:   Clear to auscultation bilaterally. Heart:  Regular rate and rhythm, S1, S2 normal, no murmur, click, rub or gallop. Abdomen:   Soft, non-tender. Bowel sounds normal. No masses,  No organomegaly. Extremities: Extremities normal, atraumatic, no cyanosis or edema. Pulses: 2+ and symmetric all extremities. Skin: Skin color, texture, turgor normal. No rashes or lesions   Lymph nodes: Cervical, supraclavicular, and axillary nodes normal.   Neurologic: CNII-XII intact. Normal strength, sensation and reflexes throughout. Lab/Data Review: All lab results for the last 24 hours reviewed.          Assessment:     Active Problems:    STEMI involving oth coronary artery of inferior wall ([facility] [address] (5/18/2021)    Patient is a 25-year-old -American female with:  1. Inferior STEMI: Total occluded mid RCA status post drug-eluting stent 3.0 x 26 mm.  2.  Hypertensive emergency  3. Hyperlipidemia       Plan:     Troponin this morning 0.69. We will continue to trend troponin. Labs are stable. Okay to transfer out of the ICU from cardiac standpoint. Echocardiogram pending. Right radial pulses +2 without hematoma. We will plan for outpatient staged PCI of left circumflex artery. Continue dual antiplatelet therapy, atorvastatin. Add losartan.     Signed By: Nicky Masters MD     May 19, 2021

## 2021-05-19 NOTE — ROUTINE PROCESS
Four eyes skin assessment completed with Jadon Gandhi RN. Skin warm, dry and intact, with exception of puncture to right wrist from procedure. Dressing clean, dry and intact. Will continue to monitor.

## 2021-05-19 NOTE — H&P
History and Physical    NAME: Lenor Lefort   :  1955   MRN:  [id number]     Date/Time:  2021 11:19 AM    Patient PCP: Dmitriy Balderas MD  ______________________________________________________________________             Subjective:     CHIEF COMPLAINT:     Chest pain    HISTORY OF PRESENT ILLNESS:       Patient is a 78y.o. year old female history of hypertension came to the [facility] for sudden onset of retrosternal heavy chest pain 5 out of 10 started while she was doing yard work denies any nausea vomiting diarrhea no constipation pressure was elevated patient started on heparin and Brilinta and transferred to [facility] seen by the cardiology and patient was admitted with inferior STEMI    Past Medical History:   Diagnosis Date    Hypertension     Menopause         No past surgical history on file. Social History     Tobacco Use    Smoking status: Never Smoker    Smokeless tobacco: Never Used   Substance Use Topics    Alcohol use: Not on file        No family history on file.     No Known Allergies     Prior to Admission medications    Not on File         Current Facility-Administered Medications:     losartan (COZAAR) tablet 25 mg, 25 mg, Oral, DAILY, Ranjana Chopra MD    glucose chewable tablet 16 g, 4 Tablet, Oral, PRN, Ranjana Chopra MD    dextrose (D50W) injection syrg 12.5-25 g, 25-50 mL, IntraVENous, PRN, Ranjana Chopra MD    glucagon (GLUCAGEN) injection 1 mg, 1 mg, IntraMUSCular, PRN, Ranjana Chopra MD    insulin lispro (HUMALOG) injection, , SubCUTAneous, TIDAC, Ranjana Chopra MD, 2 Units at 21 0814    aspirin chewable tablet 81 mg, 81 mg, Oral, DAILY, Ranjana Chopra MD, 81 mg at 21 08    ticagrelor (BRILINTA) tablet 90 mg, 90 mg, Oral, Q12H, Ranjana Chopra MD, 90 mg at 21 08    atorvastatin (LIPITOR) tablet 80 mg, 80 mg, Oral, DAILY, Ranjana Chopra MD, 80 mg at 21 08    metoprolol succinate (TOPROL-XL) XL tablet 25 mg, 25 mg, Oral, DAILY, Ranjana Chopra MD, 25 mg at 05/19/21 0813    LAB DATA REVIEWED:    Recent Results (from the past 24 hour(s))   EKG, 12 LEAD, INITIAL    Collection Time: 05/18/21  5:28 PM   Result Value Ref Range    Ventricular Rate 67 BPM    Atrial Rate 67 BPM    P-R Interval 182 ms    QRS Duration 80 ms    Q-T Interval 398 ms    QTC Calculation (Bezet) 420 ms    Calculated P Axis 40 degrees    Calculated R Axis 41 degrees    Calculated T Axis 106 degrees    Diagnosis       Sinus rhythm  Inferior infarct, acute (RCA)  Probable anterior infarct, old  Lateral leads are also involved  Probable RV involvement, suggest recording right precordial leads  >>> Acute MI <<<     CBC WITH AUTOMATED DIFF    Collection Time: 05/18/21  5:40 PM   Result Value Ref Range    WBC 11.0 4.4 - 11.3 K/uL    RBC 4.38 (L) 4.50 - 5.90 M/uL    HGB 12.3 (L) 13.5 - 17.5 g/dL    HCT 37.9 36 - 46 %    MCV 86.5 80 - 100 FL    MCH 28.2 (L) 31 - 34 PG    MCHC 32.5 31.0 - 36.0 g/dL    RDW 14.1 11.5 - 14.5 %    PLATELET 548 720 - 976 K/uL    MPV 8.8 6.5 - 11.5 FL    NRBC 0.0  WBC    ABSOLUTE NRBC 0.00 K/uL    NEUTROPHILS 86 (H) 42 - 75 %    LYMPHOCYTES 10 (L) 20.5 - 51.1 %    MONOCYTES 4 1.7 - 9.3 %    EOSINOPHILS 0 (L) 0.9 - 2.9 %    BASOPHILS 0 0.0 - 2.5 %    ABS. NEUTROPHILS 9.5 (H) 1.8 - 7.7 K/UL    ABS. LYMPHOCYTES 1.0 1.0 - 4.8 K/UL    ABS. MONOCYTES 0.4 0.2 - 2.4 K/UL    ABS. EOSINOPHILS 0.0 0.0 - 0.7 K/UL    ABS.  BASOPHILS 0.0 0.0 - 0.2 K/UL   PROTHROMBIN TIME + INR    Collection Time: 05/18/21  5:40 PM   Result Value Ref Range    Prothrombin time 9.9 9.0 - 11.1 sec    INR 1.0 0.9 - 1.1     METABOLIC PANEL, COMPREHENSIVE    Collection Time: 05/18/21  5:40 PM   Result Value Ref Range    Sodium 139 136 - 145 mmol/L    Potassium 3.9 3.5 - 5.1 mmol/L    Chloride 103 97 - 108 mmol/L    CO2 29 21 - 32 mmol/L    Anion gap 7 5 - 15 mmol/L    Glucose 268 (H) 65 - 100 mg/dL    BUN 17 6 - 20 mg/dL Creatinine 1.23 (H) 0.55 - 1.02 mg/dL    BUN/Creatinine ratio 14 12 - 20      GFR est AA 53 (L) >60 ml/min/1.73m2    GFR est non-AA 44 (L) >60 ml/min/1.73m2    Calcium 9.3 8.5 - 10.1 mg/dL    Bilirubin, total 0.7 0.2 - 1.0 mg/dL    AST (SGOT) 38 (H) 15 - 37 U/L    ALT (SGPT) 22 12 - 78 U/L    Alk. phosphatase 153 (H) 45 - 117 U/L    Protein, total 8.4 (H) 6.4 - 8.2 g/dL    Albumin 3.8 3.5 - 5.0 g/dL    Globulin 4.6 (H) 2.0 - 4.0 g/dL    A-G Ratio 0.8 (L) 1.1 - 2.2     TROPONIN I    Collection Time: 05/18/21  5:40 PM   Result Value Ref Range    Troponin-I, Qt. <0.05 <0.05 ng/mL   MRSA SCREEN - PCR (NASAL)    Collection Time: 05/18/21  9:30 PM   Result Value Ref Range    MRSA by PCR, Nasal Not Detected Not Detected     GLUCOSE, POC    Collection Time: 05/18/21 11:47 PM   Result Value Ref Range    Glucose (POC) 211 (H) 65 - 117 mg/dL    Performed by [facility] MELISSA    TROPONIN I    Collection Time: 05/19/21  4:15 AM   Result Value Ref Range    Troponin-I, Qt. 0.69 (H) <5.99 ng/mL   METABOLIC PANEL, COMPREHENSIVE    Collection Time: 05/19/21  4:15 AM   Result Value Ref Range    Sodium 136 136 - 145 mmol/L    Potassium 4.2 3.5 - 5.1 mmol/L    Chloride 103 97 - 108 mmol/L    CO2 28 21 - 32 mmol/L    Anion gap 5 5 - 15 mmol/L    Glucose 168 (H) 65 - 100 mg/dL    BUN 16 6 - 20 mg/dL    Creatinine 0.96 0.55 - 1.02 mg/dL    BUN/Creatinine ratio 17 12 - 20      GFR est AA >60 >60 ml/min/1.73m2    GFR est non-AA 58 (L) >60 ml/min/1.73m2    Calcium 9.2 8.5 - 10.1 mg/dL    Bilirubin, total 1.0 0.2 - 1.0 mg/dL    AST (SGOT) 140 (H) 15 - 37 U/L    ALT (SGPT) 27 12 - 78 U/L    Alk.  phosphatase 146 (H) 45 - 117 U/L    Protein, total 8.0 6.4 - 8.2 g/dL    Albumin 3.6 3.5 - 5.0 g/dL    Globulin 4.4 (H) 2.0 - 4.0 g/dL    A-G Ratio 0.8 (L) 1.1 - 2.2     TSH 3RD GENERATION    Collection Time: 05/19/21  4:15 AM   Result Value Ref Range    TSH 1.09 0.36 - 3.74 uIU/mL   GLUCOSE, POC    Collection Time: 05/19/21  7:56 AM   Result Value Ref Range    Glucose (POC) 163 (H) 65 - 117 mg/dL    Performed by John Pappas        XR Results (most recent):  Results from Hospital Encounter encounter on 05/18/21    XR CHEST PORT    Narrative  Acute myocardial infarction. Comparison chest x-ray 1/27/2017. FINDINGS: Single frontal view of the chest. Mild cardiomegaly. Calcific  atherosclerosis of aortic arch. Mild vascular congestion and hydrostatic edema. The lungs are mildly hypoinflated. No consolidation, effusion, pneumothorax. No  free air under diaphragm. Bony structures grossly intact. Impression  Mild cardiomegaly, vascular congestion, hydrostatic edema. No orders to display        Review of Systems:  Constitutional: Negative for chills and fever. HENT: Negative. Eyes: Negative. Respiratory: Negative. Cardiovascular: Negative. Gastrointestinal: Negative for abdominal pain and nausea. Skin: Negative. Neurological: Negative. Objective:   VITALS:    Visit Vitals  BP (!) 148/87   Pulse 79   Temp 98.1 °F (36.7 °C)   Resp 21   Ht 5' 7\" (1.702 m)   Wt 72 kg (158 lb 11.7 oz)   SpO2 97%   Breastfeeding No   BMI 24.86 kg/m²       Physical Exam:   Constitutional: pt is oriented to person, place, and time. HENT:   Head: Normocephalic and atraumatic. Eyes: Pupils are equal, round, and reactive to light. EOM are normal.   Cardiovascular: Normal rate, regular rhythm and normal heart sounds. Pulmonary/Chest: Breath sounds normal. No wheezes. No rales. Exhibits no tenderness. Abdominal: Soft. Bowel sounds are normal. There is no abdominal tenderness. There is no rebound and no guarding. Musculoskeletal: Normal range of motion. Neurological: pt is alert and oriented to person, place, and time. Alert. Normal strength. No cranial nerve deficit or sensory deficit. Displays a negative Romberg sign.         ASSESSMENT & PLAN:  Inferior STEMI total occluded mid RCA static post drug-eluting stent  Hypertensive emergency  Hyperlipidemia      Patient admitted to ICU start on aspirin 81 mg daily Lipitor 80 mg daily Cozaar 25 mg daily metoprolol XL 25 mg daily Brilinta 90 mg twice daily      ________________________________________________________________________    Signed: Libertad Zapata MD

## 2021-05-19 NOTE — PROGRESS NOTES
Provided patient education about and explained the importance of medication compliance. Patient was given Brilinta coupon. Patient was advised that if the antiplatelet medication becomes unaffordable, she must notify the cardiologist immediately so that an alternate plan for antiplatelet therapy can be made. Patient stated that she will fill this prescription before or upon discharge so that it will be available for the next scheduled dose after discharge. Patient asked appropriate questions and verbalized understanding during this consultation and was given printed teaching materials and my contact information in case I can provide further assistance. We discussed the option to have prescriptions filled and delivered to bedside before discharge through [facility]. Phase 2 outpatient cardiac rehab referral is not appropriate for this patient at this time due to physical and/or mental limitations, diagnosis and/or treatment plans.

## 2021-05-19 NOTE — CONSULTS
Consult    Patient: Tremayne Shen MRN: [id number]  SSN: [id number]    YOB: 1955  Age: 78 y.o. Sex: female      Subjective:      Tremayne Shen is a 78 y.o. female who is being seen for inferior STEMI. Patient with history of hypertension who presented to [facility] emergency room with sudden onset, retrosternal heavy in nature chest pain that was 5/10 in severity that started while she was doing some yard work. Denied any nausea, vomiting or excessive sweating. Her blood pressure was severely elevated and she was on palliative educators in the lateral leads. She received heparin, Brilinta and was transferred for further care. Patient was life flighted. She had some excessive sweating. She denied recent change in her weight or lower extremity swelling. She denied previous cardiac work-up. Past Medical History:   Diagnosis Date    Hypertension     Menopause      No past surgical history on file. No family history on file.   Social History     Tobacco Use    Smoking status: Never Smoker    Smokeless tobacco: Never Used   Substance Use Topics    Alcohol use: Not on file      Current Facility-Administered Medications   Medication Dose Route Frequency Provider Last Rate Last Admin    glucose chewable tablet 16 g  4 Tab Oral PRN Ranjana Chopra MD        dextrose (D50W) injection syrg 12.5-25 g  25-50 mL IntraVENous PRN Ranjana Chopra MD        glucagon (GLUCAGEN) injection 1 mg  1 mg IntraMUSCular PRN Ranjana Chopra MD        [START ON 5/19/2021] insulin lispro (HUMALOG) injection   SubCUTAneous TIDAC Ranjana Chopra MD        [START ON 5/19/2021] aspirin chewable tablet 81 mg  81 mg Oral DAILY Ranjana Chopra MD        ticagrelor (BRILINTA) tablet 90 mg  90 mg Oral Q12H Ranjana Chopra MD        [START ON 5/19/2021] atorvastatin (LIPITOR) tablet 80 mg  80 mg Oral DAILY Ranjana Chopra MD        [START ON 5/19/2021] metoprolol succinate (TOPROL-XL) XL tablet 25 mg  25 mg Oral DAILY Ranjana Chopra MD            No Known Allergies    Review of Systems:  A comprehensive review of systems was negative except for that written in the History of Present Illness. Objective:     Vitals:    05/18/21 2050 05/18/21 2052 05/18/21 2110   BP: (!) 156/84  (!) 166/87   Pulse: 82  80   Resp: 21  17   Temp: 98.2 °F (36.8 °C)     SpO2: 98% 97% 99%   Weight: 72 kg (158 lb 11.7 oz)     Height: 5' 7\" (1.702 m)          Physical Exam:  General:  Alert, cooperative, no distress, appears stated age. Eyes:  Conjunctivae/corneas clear. PERRL, EOMs intact. Fundi benign   Ears:  Normal TMs and external ear canals both ears. Nose: Nares normal. Septum midline. Mucosa normal. No drainage or sinus tenderness. Mouth/Throat: Lips, mucosa, and tongue normal. Teeth and gums normal.   Neck: Supple, symmetrical, trachea midline, no adenopathy, thyroid: no enlargment/tenderness/nodules, no carotid bruit and no JVD. Back:   Symmetric, no curvature. ROM normal. No CVA tenderness. Lungs:   Clear to auscultation bilaterally. Heart:  Regular rate and rhythm, S1, S2 normal, no murmur, click, rub or gallop. Abdomen:   Soft, non-tender. Bowel sounds normal. No masses,  No organomegaly. Extremities: Extremities normal, atraumatic, no cyanosis or edema. Pulses: 2+ and symmetric all extremities. Skin: Skin color, texture, turgor normal. No rashes or lesions   Lymph nodes: Cervical, supraclavicular, and axillary nodes normal.   Neurologic: CNII-XII intact. Normal strength, sensation and reflexes throughout. Assessment:     Hospital Problems  Never Reviewed          Codes Class Noted POA    STEMI involving [facility] coronary artery of inferior wall [facility]) ICD-10-CM: I21.19  ICD-9-CM: 410.40  5/18/2021 Unknown          Patient is a 23-year-old -American female with:  1. Inferior STEMI: Total occluded mid RCA status post drug-eluting stent 3.0 x 26 mm.  2.  Hypertensive emergency  3. Hyperlipidemia    Plan:   EKG shows sinus rhythm, inferior ST elevation with reciprocal changes. Her creatinine was 1.23 and BUN of 17, potassium 3.9. White count 11,000 and hemoglobin 12.3. Liver function test within normal limits. AST was mildly elevated at 38 and alkaline phosphatase of 53. First set of troponin is negative. Chest x-ray showed mild cardiomegaly with hydrostatic edema. Patient was taken to the Cath Lab emergently and was found to have totally occluded mid RCA. She was noted to have severe mid to distal left circumflex artery disease. Plans for staged PCI at some other point. We will admit to the ICU for critical care management. We will obtain an echocardiogram.  We will continue dual antiplatelet therapy for at least 1 year and then aspirin indefinitely. High-dose atorvastatin. Blood sugar was 268. We will check A1c. We will start metoprolol succinate. Consider adding ACE inhibitor. Thank you  For involving me in [facility]. I will follow.       Signed By: Murray Mahajan MD     May 18, 2021

## 2021-05-20 LAB
ALBUMIN SERPL-MCNC: 3.2 G/DL (ref 3.5–5)
ALBUMIN/GLOB SERPL: 0.8 {RATIO} (ref 1.1–2.2)
ALP SERPL-CCNC: 136 U/L (ref 45–117)
ALT SERPL-CCNC: 30 U/L (ref 12–78)
ANION GAP SERPL CALC-SCNC: 5 MMOL/L (ref 5–15)
AST SERPL W P-5'-P-CCNC: 120 U/L (ref 15–37)
BASOPHILS # BLD: 0 K/UL (ref 0–0.1)
BASOPHILS NFR BLD: 0 % (ref 0–1)
BILIRUB SERPL-MCNC: 0.9 MG/DL (ref 0.2–1)
BUN SERPL-MCNC: 17 MG/DL (ref 6–20)
BUN/CREAT SERPL: 22 (ref 12–20)
CA-I BLD-MCNC: 8.7 MG/DL (ref 8.5–10.1)
CHLORIDE SERPL-SCNC: 106 MMOL/L (ref 97–108)
CO2 SERPL-SCNC: 27 MMOL/L (ref 21–32)
CREAT SERPL-MCNC: 0.78 MG/DL (ref 0.55–1.02)
DIFFERENTIAL METHOD BLD: NORMAL
EOSINOPHIL # BLD: 0.1 K/UL (ref 0–0.4)
EOSINOPHIL NFR BLD: 1 % (ref 0–7)
ERYTHROCYTE [DISTWIDTH] IN BLOOD BY AUTOMATED COUNT: 13.1 % (ref 11.5–14.5)
EST. AVERAGE GLUCOSE BLD GHB EST-MCNC: 171 MG/DL
GLOBULIN SER CALC-MCNC: 4 G/DL (ref 2–4)
GLUCOSE BLD STRIP.AUTO-MCNC: 102 MG/DL (ref 65–117)
GLUCOSE SERPL-MCNC: 91 MG/DL (ref 65–100)
HBA1C MFR BLD: 7.6 % (ref 4–5.6)
HCT VFR BLD AUTO: 38 % (ref 35–47)
HGB BLD-MCNC: 12.2 G/DL (ref 11.5–16)
IMM GRANULOCYTES # BLD AUTO: 0 K/UL (ref 0–0.04)
IMM GRANULOCYTES NFR BLD AUTO: 0 % (ref 0–0.5)
LYMPHOCYTES # BLD: 1.5 K/UL (ref 0.8–3.5)
LYMPHOCYTES NFR BLD: 25 % (ref 12–49)
MCH RBC QN AUTO: 27.5 PG (ref 26–34)
MCHC RBC AUTO-ENTMCNC: 32.1 G/DL (ref 30–36.5)
MCV RBC AUTO: 85.6 FL (ref 80–99)
MONOCYTES # BLD: 0.4 K/UL (ref 0–1)
MONOCYTES NFR BLD: 7 % (ref 5–13)
NEUTS SEG # BLD: 4 K/UL (ref 1.8–8)
NEUTS SEG NFR BLD: 67 % (ref 32–75)
NRBC # BLD: 0 K/UL (ref 0–0.01)
NRBC BLD-RTO: 0 PER 100 WBC
PERFORMED BY, TECHID: NORMAL
PLATELET # BLD AUTO: 274 K/UL (ref 150–400)
PMV BLD AUTO: 10.6 FL (ref 8.9–12.9)
POTASSIUM SERPL-SCNC: 3.7 MMOL/L (ref 3.5–5.1)
PROT SERPL-MCNC: 7.2 G/DL (ref 6.4–8.2)
RBC # BLD AUTO: 4.44 M/UL (ref 3.8–5.2)
SODIUM SERPL-SCNC: 138 MMOL/L (ref 136–145)
TROPONIN I SERPL-MCNC: 3.67 NG/ML
WBC # BLD AUTO: 6 K/UL (ref 3.6–11)

## 2021-05-20 PROCEDURE — 74011636637 HC RX REV CODE- 636/637: Performed by: INTERNAL MEDICINE

## 2021-05-20 PROCEDURE — 85025 COMPLETE CBC W/AUTO DIFF WBC: CPT

## 2021-05-20 PROCEDURE — 74011250637 HC RX REV CODE- 250/637: Performed by: INTERNAL MEDICINE

## 2021-05-20 PROCEDURE — 80053 COMPREHEN METABOLIC PANEL: CPT

## 2021-05-20 PROCEDURE — 84484 ASSAY OF TROPONIN QUANT: CPT

## 2021-05-20 PROCEDURE — 36415 COLL VENOUS BLD VENIPUNCTURE: CPT

## 2021-05-20 PROCEDURE — 82962 GLUCOSE BLOOD TEST: CPT

## 2021-05-20 PROCEDURE — 65270000029 HC RM PRIVATE

## 2021-05-20 RX ADMIN — LOSARTAN POTASSIUM 25 MG: 50 TABLET, FILM COATED ORAL at 08:24

## 2021-05-20 RX ADMIN — ATORVASTATIN CALCIUM 80 MG: 40 TABLET, FILM COATED ORAL at 08:24

## 2021-05-20 RX ADMIN — TICAGRELOR 90 MG: 90 TABLET ORAL at 08:24

## 2021-05-20 RX ADMIN — METOPROLOL SUCCINATE 25 MG: 25 TABLET, EXTENDED RELEASE ORAL at 08:23

## 2021-05-20 RX ADMIN — TICAGRELOR 90 MG: 90 TABLET ORAL at 22:29

## 2021-05-20 RX ADMIN — ASPIRIN 81 MG CHEWABLE TABLET 81 MG: 81 TABLET CHEWABLE at 08:23

## 2021-05-20 RX ADMIN — INSULIN LISPRO 2 UNITS: 100 INJECTION, SOLUTION INTRAVENOUS; SUBCUTANEOUS at 11:45

## 2021-05-20 NOTE — PROGRESS NOTES
General Daily Progress Note          Patient Name:   Christiano Granger       YOB: 1955       Age:  78 y.o.       Admit Date: 5/18/2021      Subjective:       Patient alert awake not in distress denies any chest pain or shortness of breath           Objective:     Visit Vitals  BP (!) 145/81   Pulse 89   Temp 98 °F (36.7 °C)   Resp 16   Ht 5' 7\" (1.702 m)   Wt 72 kg (158 lb 11.7 oz)   SpO2 97%   Breastfeeding No   BMI 24.86 kg/m²        Recent Results (from the past 24 hour(s))   ECHO ADULT COMPLETE    Collection Time: 05/19/21 11:17 AM   Result Value Ref Range    LV Mass .3 67.0 - 162.0 g    LV Mass AL Index 113.8 43.0 - 95.0 g/m2    Pulmonic Regurgitant End Max Velocity 115.00 cm/s    AoV PG 5.00 mmHg    IVSd 1.45 (A) 0.60 - 0.90 cm    LVIDd 4.39 3.90 - 5.30 cm    LVIDs 2.81 cm    Pulmonic Regurgitant End Max Velocity 78.40 cm/s    LVOT Peak Gradient 2.00 mmHg    LVPWd 1.10 (A) 0.60 - 0.90 cm    LV E' Septal Velocity 3.51 cm/s    LV ED Vol A2C 84.60 cm3    BP EF 65.8 55.0 - 100.0 %    LV ES Vol A2C 22.20 cm3    E/E' septal 16.52     Pulmonic Regurgitant End Max Velocity 151.00 cm/s    Mitral Valve Deceleration [place] 1,380.00 mm/s2    Mitral Valve Deceleration [place] 1,380.00 mm/s2    Mitral Valve E Wave Deceleration Time 444.00 ms    Mitral Valve Pressure Half-time 136.00 ms    MV A Willie 111.00 cm/s    MV E Willie 58.00 cm/s    MVA (PHT) 1.62 cm2    MV E/A 0.52     Pulmonic Regurgitant End Max Velocity 65.20 cm/s    Pulmonic Valve Systolic Peak Instantaneous Gradient 2.00 mmHg    P Vein A Dur 77.00 ms    Pulmonary Vein \"A\" Wave Velocity 26.70 cm/s    Est. RA Pressure 3.00 mmHg    RVIDd 3.04 cm    RVSP 20.00 mmHg    Pulmonic Regurgitant End Max Velocity 204.00 cm/s    TV MG 17.00 mmHg    LA Area 4C 11.87 cm2    Left Atrium Minor Axis 1.48 cm    Left Atrium Major Axis 2.70 cm    Ao Root D 3.20 cm   GLUCOSE, POC    Collection Time: 05/19/21 11:22 AM   Result Value Ref Range    Glucose (POC) 146 (H) 65 - 117 mg/dL    Performed by Ale Zuniga    GLUCOSE, POC    Collection Time: 05/19/21  4:57 PM   Result Value Ref Range    Glucose (POC) 196 (H) 65 - 117 mg/dL    Performed by Ale Zuniga    TROPONIN I    Collection Time: 05/20/21  3:20 AM   Result Value Ref Range    Troponin-I, Qt. 3.67 (H) <0.05 ng/mL   CBC WITH AUTOMATED DIFF    Collection Time: 05/20/21  3:20 AM   Result Value Ref Range    WBC 6.0 3.6 - 11.0 K/uL    RBC 4.44 3.80 - 5.20 M/uL    HGB 12.2 11.5 - 16.0 g/dL    HCT 38.0 35.0 - 47.0 %    MCV 85.6 80.0 - 99.0 FL    MCH 27.5 26.0 - 34.0 PG    MCHC 32.1 30.0 - 36.5 g/dL    RDW 13.1 11.5 - 14.5 %    PLATELET 526 665 - 211 K/uL    MPV 10.6 8.9 - 12.9 FL    NRBC 0.0 0.0  WBC    ABSOLUTE NRBC 0.00 0.00 - 0.01 K/uL    NEUTROPHILS 67 32 - 75 %    LYMPHOCYTES 25 12 - 49 %    MONOCYTES 7 5 - 13 %    EOSINOPHILS 1 0 - 7 %    BASOPHILS 0 0 - 1 %    IMMATURE GRANULOCYTES 0 0 - 0.5 %    ABS. NEUTROPHILS 4.0 1.8 - 8.0 K/UL    ABS. LYMPHOCYTES 1.5 0.8 - 3.5 K/UL    ABS. MONOCYTES 0.4 0.0 - 1.0 K/UL    ABS. EOSINOPHILS 0.1 0.0 - 0.4 K/UL    ABS. BASOPHILS 0.0 0.0 - 0.1 K/UL    ABS. IMM. GRANS. 0.0 0.00 - 0.04 K/UL    DF AUTOMATED     METABOLIC PANEL, COMPREHENSIVE    Collection Time: 05/20/21  3:20 AM   Result Value Ref Range    Sodium 138 136 - 145 mmol/L    Potassium 3.7 3.5 - 5.1 mmol/L    Chloride 106 97 - 108 mmol/L    CO2 27 21 - 32 mmol/L    Anion gap 5 5 - 15 mmol/L    Glucose 91 65 - 100 mg/dL    BUN 17 6 - 20 mg/dL    Creatinine 0.78 0.55 - 1.02 mg/dL    BUN/Creatinine ratio 22 (H) 12 - 20      GFR est AA >60 >60 ml/min/1.73m2    GFR est non-AA >60 >60 ml/min/1.73m2    Calcium 8.7 8.5 - 10.1 mg/dL    Bilirubin, total 0.9 0.2 - 1.0 mg/dL    AST (SGOT) 120 (H) 15 - 37 U/L    ALT (SGPT) 30 12 - 78 U/L    Alk.  phosphatase 136 (H) 45 - 117 U/L    Protein, total 7.2 6.4 - 8.2 g/dL    Albumin 3.2 (L) 3.5 - 5.0 g/dL    Globulin 4.0 2.0 - 4.0 g/dL    A-G Ratio 0.8 (L) 1.1 - 2.2     GLUCOSE, POC Collection Time: 05/20/21  7:45 AM   Result Value Ref Range    Glucose (POC) 102 65 - 117 mg/dL    Performed by Michaela Marino      [unfilled]      Review of Systems    Constitutional: Negative for chills and fever. HENT: Negative. Eyes: Negative. Respiratory: Negative. Cardiovascular: Negative. Gastrointestinal: Negative for abdominal pain and nausea. Skin: Negative. Neurological: Negative. Physical Exam:      Constitutional: pt is oriented to person, place, and time. HENT:   Head: Normocephalic and atraumatic. Eyes: Pupils are equal, round, and reactive to light. EOM are normal.   Cardiovascular: Normal rate, regular rhythm and normal heart sounds. Pulmonary/Chest: Breath sounds normal. No wheezes. No rales. Exhibits no tenderness. Abdominal: Soft. Bowel sounds are normal. There is no abdominal tenderness. There is no rebound and no guarding. Musculoskeletal: Normal range of motion. Neurological: pt is alert and oriented to person, place, and time. XR CHEST PORT   Final Result   Underexpanded lungs with mild bibasilar atelectasis.            Recent Results (from the past 24 hour(s))   ECHO ADULT COMPLETE    Collection Time: 05/19/21 11:17 AM   Result Value Ref Range    LV Mass .3 67.0 - 162.0 g    LV Mass AL Index 113.8 43.0 - 95.0 g/m2    Pulmonic Regurgitant End Max Velocity 115.00 cm/s    AoV PG 5.00 mmHg    IVSd 1.45 (A) 0.60 - 0.90 cm    LVIDd 4.39 3.90 - 5.30 cm    LVIDs 2.81 cm    Pulmonic Regurgitant End Max Velocity 78.40 cm/s    LVOT Peak Gradient 2.00 mmHg    LVPWd 1.10 (A) 0.60 - 0.90 cm    LV E' Septal Velocity 3.51 cm/s    LV ED Vol A2C 84.60 cm3    BP EF 65.8 55.0 - 100.0 %    LV ES Vol A2C 22.20 cm3    E/E' septal 16.52     Pulmonic Regurgitant End Max Velocity 151.00 cm/s    Mitral Valve Deceleration [place] 1,380.00 mm/s2    Mitral Valve Deceleration [place] 1,380.00 mm/s2    Mitral Valve E Wave Deceleration Time 444.00 ms    Mitral Valve Pressure Half-time 136.00 ms    MV A Willie 111.00 cm/s    MV E Willie 58.00 cm/s    MVA (PHT) 1.62 cm2    MV E/A 0.52     Pulmonic Regurgitant End Max Velocity 65.20 cm/s    Pulmonic Valve Systolic Peak Instantaneous Gradient 2.00 mmHg    P Vein A Dur 77.00 ms    Pulmonary Vein \"A\" Wave Velocity 26.70 cm/s    Est. RA Pressure 3.00 mmHg    RVIDd 3.04 cm    RVSP 20.00 mmHg    Pulmonic Regurgitant End Max Velocity 204.00 cm/s    TV MG 17.00 mmHg    LA Area 4C 11.87 cm2    Left Atrium Minor Axis 1.48 cm    Left Atrium Major Axis 2.70 cm    Ao Root D 3.20 cm   GLUCOSE, POC    Collection Time: 05/19/21 11:22 AM   Result Value Ref Range    Glucose (POC) 146 (H) 65 - 117 mg/dL    Performed by Russel Hunter    GLUCOSE, POC    Collection Time: 05/19/21  4:57 PM   Result Value Ref Range    Glucose (POC) 196 (H) 65 - 117 mg/dL    Performed by Russel Hunter    TROPONIN I    Collection Time: 05/20/21  3:20 AM   Result Value Ref Range    Troponin-I, Qt. 3.67 (H) <0.05 ng/mL   CBC WITH AUTOMATED DIFF    Collection Time: 05/20/21  3:20 AM   Result Value Ref Range    WBC 6.0 3.6 - 11.0 K/uL    RBC 4.44 3.80 - 5.20 M/uL    HGB 12.2 11.5 - 16.0 g/dL    HCT 38.0 35.0 - 47.0 %    MCV 85.6 80.0 - 99.0 FL    MCH 27.5 26.0 - 34.0 PG    MCHC 32.1 30.0 - 36.5 g/dL    RDW 13.1 11.5 - 14.5 %    PLATELET 358 609 - 273 K/uL    MPV 10.6 8.9 - 12.9 FL    NRBC 0.0 0.0  WBC    ABSOLUTE NRBC 0.00 0.00 - 0.01 K/uL    NEUTROPHILS 67 32 - 75 %    LYMPHOCYTES 25 12 - 49 %    MONOCYTES 7 5 - 13 %    EOSINOPHILS 1 0 - 7 %    BASOPHILS 0 0 - 1 %    IMMATURE GRANULOCYTES 0 0 - 0.5 %    ABS. NEUTROPHILS 4.0 1.8 - 8.0 K/UL    ABS. LYMPHOCYTES 1.5 0.8 - 3.5 K/UL    ABS. MONOCYTES 0.4 0.0 - 1.0 K/UL    ABS. EOSINOPHILS 0.1 0.0 - 0.4 K/UL    ABS. BASOPHILS 0.0 0.0 - 0.1 K/UL    ABS. IMM.  GRANS. 0.0 0.00 - 0.04 K/UL    DF AUTOMATED     METABOLIC PANEL, COMPREHENSIVE    Collection Time: 05/20/21  3:20 AM   Result Value Ref Range    Sodium 138 136 - 145 mmol/L    Potassium 3.7 3.5 - 5.1 mmol/L    Chloride 106 97 - 108 mmol/L    CO2 27 21 - 32 mmol/L    Anion gap 5 5 - 15 mmol/L    Glucose 91 65 - 100 mg/dL    BUN 17 6 - 20 mg/dL    Creatinine 0.78 0.55 - 1.02 mg/dL    BUN/Creatinine ratio 22 (H) 12 - 20      GFR est AA >60 >60 ml/min/1.73m2    GFR est non-AA >60 >60 ml/min/1.73m2    Calcium 8.7 8.5 - 10.1 mg/dL    Bilirubin, total 0.9 0.2 - 1.0 mg/dL    AST (SGOT) 120 (H) 15 - 37 U/L    ALT (SGPT) 30 12 - 78 U/L    Alk. phosphatase 136 (H) 45 - 117 U/L    Protein, total 7.2 6.4 - 8.2 g/dL    Albumin 3.2 (L) 3.5 - 5.0 g/dL    Globulin 4.0 2.0 - 4.0 g/dL    A-G Ratio 0.8 (L) 1.1 - 2.2     GLUCOSE, POC    Collection Time: 05/20/21  7:45 AM   Result Value Ref Range    Glucose (POC) 102 65 - 117 mg/dL    Performed by Iglesia Momence        Results     Procedure Component Value Units Date/Time    MRSA SCREEN - PCR (NASAL) [[id number]] Collected: 05/18/21 2130    Order Status: Completed Specimen: Swab Updated: 05/18/21 2305     MRSA by PCR, Nasal Not Detected              Labs:     Recent Labs     05/20/21  0320 05/18/21  1740   WBC 6.0 11.0   HGB 12.2 12.3*   HCT 38.0 37.9    293     Recent Labs     05/20/21  0320 05/19/21  0415 05/18/21  1740    136 139   K 3.7 4.2 3.9    103 103   CO2 27 28 29   BUN 17 16 17   CREA 0.78 0.96 1.23*   GLU 91 168* 268*   CA 8.7 9.2 9.3     Recent Labs     05/20/21  0320 05/19/21  0415 05/18/21  1740   ALT 30 27 22   * 146* 153*   TBILI 0.9 1.0 0.7   TP 7.2 8.0 8.4*   ALB 3.2* 3.6 3.8   GLOB 4.0 4.4* 4.6*     Recent Labs     05/18/21  1740   INR 1.0   PTP 9.9      No results for input(s): FE, TIBC, PSAT, FERR in the last 72 hours. No results found for: FOL, RBCF   No results for input(s): PH, PCO2, PO2 in the last 72 hours.   Recent Labs     05/20/21  0320 05/19/21  0415 05/18/21  1740   TROIQ 3.67* 0.69* <0.05     Lab Results   Component Value Date/Time    Cholesterol, total 187 05/19/2021 04:15 AM    HDL Cholesterol 69 05/19/2021 04:15 AM    LDL, calculated 105.6 (H) 05/19/2021 04:15 AM    Triglyceride 62 05/19/2021 04:15 AM    CHOL/HDL Ratio 2.7 05/19/2021 04:15 AM     Lab Results   Component Value Date/Time    Glucose (POC) 102 05/20/2021 07:45 AM    Glucose (POC) 196 (H) 05/19/2021 04:57 PM    Glucose (POC) 146 (H) 05/19/2021 11:22 AM    Glucose (POC) 163 (H) 05/19/2021 07:56 AM    Glucose (POC) 211 (H) 05/18/2021 11:47 PM     No results found for: COLOR, APPRN, SPGRU, REFSG, VINNIE, PROTU, GLUCU, KETU, BILU, UROU, EARL, LEUKU, GLUKE, EPSU, BACTU, WBCU, RBCU, CASTS, UCRY      Assessment:     Inferior STEMI total occluded mid RCA static post drug-eluting stent  Hypertensive emergency  Hyperlipidemia      Plan:     Aspirin 81 mg daily  Lipitor 80 mg daily  Losartan 25 mg daily  Metoprolol 25 mg daily  And Brilinta 90 mg twice a day    Transfer to [facility].        Current Facility-Administered Medications:     losartan (COZAAR) tablet 25 mg, 25 mg, Oral, DAILY, Ranjana Chopra MD, 25 mg at 05/20/21 0824    glucose chewable tablet 16 g, 4 Tablet, Oral, PRN, Ranjana Chopra MD    dextrose (D50W) injection syrg 12.5-25 g, 25-50 mL, IntraVENous, PRN, Ranjana Chopra MD    glucagon (GLUCAGEN) injection 1 mg, 1 mg, IntraMUSCular, PRN, Ranjana Chopra MD    insulin lispro (HUMALOG) injection, , SubCUTAneous, TIDAC, Ranjana Chopra MD, 3 Units at 05/19/21 1719    aspirin chewable tablet 81 mg, 81 mg, Oral, DAILY, Ranjana Chopra MD, 81 mg at 05/20/21 0823    ticagrelor (BRILINTA) tablet 90 mg, 90 mg, Oral, Q12H, Ranjana Chopra MD, 90 mg at 05/20/21 0824    atorvastatin (LIPITOR) tablet 80 mg, 80 mg, Oral, DAILY, Ranjana Chopra MD, 80 mg at 05/20/21 0824    metoprolol succinate (TOPROL-XL) XL tablet 25 mg, 25 mg, Oral, DAILY, Ranjana Chopra MD, 25 mg at 05/20/21 5602

## 2021-05-20 NOTE — ROUTINE PROCESS
Patient transferred to  [id number] via wheelchair on room air at [phone]. VSS, no signs of any acute distress during transfer. Patient belongings transferred with patient. Report given to Sebastien Welch RN.

## 2021-05-20 NOTE — PROGRESS NOTES
Progress Note    Patient: Jimenez Eddy MRN: [id number]  SSN: [id number]    YOB: 1955  Age: 78 y.o. Sex: female      Admit Date: 5/18/2021    LOS: 2 days     Subjective:     No acute events overnight. Objective:     Vitals:    05/20/21 1200 05/20/21 1300 05/20/21 1400 05/20/21 1527   BP: (!) 126/57 (!) 129/57 130/65 109/71   Pulse: 83      Resp: 20 21 20 21   Temp:       SpO2: 97% 97% 97% 98%   Weight:       Height:            Intake and Output:  Current Shift: No intake/output data recorded. Last three shifts: 05/18 1901 - 05/20 0700  In: -   Out: 1100 [Urine:1100]    Physical Exam:   General:  Alert, cooperative, no distress, appears stated age. Eyes:  Conjunctivae/corneas clear. PERRL, EOMs intact. Fundi benign   Ears:  Normal TMs and external ear canals both ears. Nose: Nares normal. Septum midline. Mucosa normal. No drainage or sinus tenderness. Mouth/Throat: Lips, mucosa, and tongue normal. Teeth and gums normal.   Neck: Supple, symmetrical, trachea midline, no adenopathy, thyroid: no enlargment/tenderness/nodules, no carotid bruit and no JVD. Back:   Symmetric, no curvature. ROM normal. No CVA tenderness. Lungs:   Clear to auscultation bilaterally. Heart:  Regular rate and rhythm, S1, S2 normal, no murmur, click, rub or gallop. Abdomen:   Soft, non-tender. Bowel sounds normal. No masses,  No organomegaly. Extremities: Extremities normal, atraumatic, no cyanosis or edema. Pulses: 2+ and symmetric all extremities. Skin: Skin color, texture, turgor normal. No rashes or lesions   Lymph nodes: Cervical, supraclavicular, and axillary nodes normal.   Neurologic: CNII-XII intact. Normal strength, sensation and reflexes throughout. Lab/Data Review: All lab results for the last 24 hours reviewed.          Assessment:     Active Problems:    STEMI involving oth coronary artery of inferior wall ([address] (5/18/2021)    Patient is a 29-year-old -American female with:  1. Inferior STEMI: Total occluded mid RCA status post drug-eluting stent 3.0 x 26 mm.  2.  Hypertensive emergency  3. Hyperlipidemia       Plan:     Okay to transfer out of the ICU. Troponin at 3.67. Recheck troponin in a.m. Rhythm was stable. Denied having any chest pain. We will monitor overnight and possible discharge in the morning.     Signed By: Danni Fisher MD     May 20, 2021

## 2021-05-21 VITALS
RESPIRATION RATE: 18 BRPM | HEIGHT: 67 IN | DIASTOLIC BLOOD PRESSURE: 85 MMHG | TEMPERATURE: 97.8 F | WEIGHT: 158.73 LBS | SYSTOLIC BLOOD PRESSURE: 128 MMHG | BODY MASS INDEX: 24.91 KG/M2 | HEART RATE: 82 BPM | OXYGEN SATURATION: 98 %

## 2021-05-21 LAB
ALBUMIN SERPL-MCNC: 3.4 G/DL (ref 3.5–5)
ALBUMIN/GLOB SERPL: 0.8 {RATIO} (ref 1.1–2.2)
ALP SERPL-CCNC: 138 U/L (ref 45–117)
ALT SERPL-CCNC: 26 U/L (ref 12–78)
ANION GAP SERPL CALC-SCNC: 10 MMOL/L (ref 5–15)
AST SERPL W P-5'-P-CCNC: 52 U/L (ref 15–37)
BASOPHILS # BLD: 0 K/UL (ref 0–0.1)
BASOPHILS NFR BLD: 1 % (ref 0–1)
BILIRUB SERPL-MCNC: 0.8 MG/DL (ref 0.2–1)
BUN SERPL-MCNC: 23 MG/DL (ref 6–20)
BUN/CREAT SERPL: 24 (ref 12–20)
CA-I BLD-MCNC: 9.3 MG/DL (ref 8.5–10.1)
CHLORIDE SERPL-SCNC: 104 MMOL/L (ref 97–108)
CO2 SERPL-SCNC: 23 MMOL/L (ref 21–32)
CREAT SERPL-MCNC: 0.96 MG/DL (ref 0.55–1.02)
DIFFERENTIAL METHOD BLD: ABNORMAL
EOSINOPHIL # BLD: 0.1 K/UL (ref 0–0.4)
EOSINOPHIL NFR BLD: 2 % (ref 0–7)
ERYTHROCYTE [DISTWIDTH] IN BLOOD BY AUTOMATED COUNT: 13 % (ref 11.5–14.5)
GLOBULIN SER CALC-MCNC: 4.1 G/DL (ref 2–4)
GLUCOSE SERPL-MCNC: 163 MG/DL (ref 65–100)
HCT VFR BLD AUTO: 41.3 % (ref 35–47)
HGB BLD-MCNC: 13.2 G/DL (ref 11.5–16)
IMM GRANULOCYTES # BLD AUTO: 0 K/UL (ref 0–0.04)
IMM GRANULOCYTES NFR BLD AUTO: 1 % (ref 0–0.5)
LYMPHOCYTES # BLD: 1.5 K/UL (ref 0.8–3.5)
LYMPHOCYTES NFR BLD: 25 % (ref 12–49)
MCH RBC QN AUTO: 27.6 PG (ref 26–34)
MCHC RBC AUTO-ENTMCNC: 32 G/DL (ref 30–36.5)
MCV RBC AUTO: 86.4 FL (ref 80–99)
MONOCYTES # BLD: 0.5 K/UL (ref 0–1)
MONOCYTES NFR BLD: 9 % (ref 5–13)
NEUTS SEG # BLD: 3.7 K/UL (ref 1.8–8)
NEUTS SEG NFR BLD: 62 % (ref 32–75)
NRBC # BLD: 0 K/UL (ref 0–0.01)
NRBC BLD-RTO: 0 PER 100 WBC
PLATELET # BLD AUTO: 273 K/UL (ref 150–400)
PMV BLD AUTO: 10.8 FL (ref 8.9–12.9)
POTASSIUM SERPL-SCNC: 3.7 MMOL/L (ref 3.5–5.1)
PROT SERPL-MCNC: 7.5 G/DL (ref 6.4–8.2)
RBC # BLD AUTO: 4.78 M/UL (ref 3.8–5.2)
SODIUM SERPL-SCNC: 137 MMOL/L (ref 136–145)
WBC # BLD AUTO: 5.8 K/UL (ref 3.6–11)

## 2021-05-21 PROCEDURE — 74011250637 HC RX REV CODE- 250/637: Performed by: INTERNAL MEDICINE

## 2021-05-21 PROCEDURE — 80053 COMPREHEN METABOLIC PANEL: CPT

## 2021-05-21 PROCEDURE — 85025 COMPLETE CBC W/AUTO DIFF WBC: CPT

## 2021-05-21 PROCEDURE — 36415 COLL VENOUS BLD VENIPUNCTURE: CPT

## 2021-05-21 RX ORDER — PRASUGREL 10 MG/1
60 TABLET, FILM COATED ORAL ONCE
Status: COMPLETED | OUTPATIENT
Start: 2021-05-21 | End: 2021-05-21

## 2021-05-21 RX ORDER — GUAIFENESIN 100 MG/5ML
81 LIQUID (ML) ORAL DAILY
Qty: 30 TABLET | Refills: 0 | Status: SHIPPED | OUTPATIENT
Start: 2021-05-22

## 2021-05-21 RX ORDER — METOPROLOL SUCCINATE 25 MG/1
25 TABLET, EXTENDED RELEASE ORAL DAILY
Qty: 30 TABLET | Refills: 0 | Status: SHIPPED | OUTPATIENT
Start: 2021-05-22

## 2021-05-21 RX ORDER — PRASUGREL 10 MG/1
10 TABLET, FILM COATED ORAL DAILY
Status: DISCONTINUED | OUTPATIENT
Start: 2021-05-22 | End: 2021-05-21 | Stop reason: HOSPADM

## 2021-05-21 RX ORDER — LOSARTAN POTASSIUM 25 MG/1
25 TABLET ORAL DAILY
Qty: 30 TABLET | Refills: 0 | Status: SHIPPED | OUTPATIENT
Start: 2021-05-22 | End: 2022-08-04

## 2021-05-21 RX ORDER — ATORVASTATIN CALCIUM 80 MG/1
80 TABLET, FILM COATED ORAL DAILY
Qty: 30 TABLET | Refills: 0 | Status: ON HOLD | OUTPATIENT
Start: 2021-05-22 | End: 2022-09-30

## 2021-05-21 RX ORDER — PRASUGREL 10 MG/1
10 TABLET, FILM COATED ORAL DAILY
Qty: 30 TABLET | Refills: 0 | Status: SHIPPED | OUTPATIENT
Start: 2021-05-22 | End: 2022-01-14

## 2021-05-21 RX ADMIN — LOSARTAN POTASSIUM 25 MG: 50 TABLET, FILM COATED ORAL at 08:58

## 2021-05-21 RX ADMIN — PRASUGREL 60 MG: 10 TABLET, FILM COATED ORAL at 10:33

## 2021-05-21 RX ADMIN — ASPIRIN 81 MG CHEWABLE TABLET 81 MG: 81 TABLET CHEWABLE at 08:58

## 2021-05-21 RX ADMIN — ATORVASTATIN CALCIUM 80 MG: 40 TABLET, FILM COATED ORAL at 08:58

## 2021-05-21 RX ADMIN — METOPROLOL SUCCINATE 25 MG: 25 TABLET, EXTENDED RELEASE ORAL at 08:58

## 2021-05-21 NOTE — INTERDISCIPLINARY ROUNDS
Ambulatory out in desir with one stand by assist.  Gait slow and steady. Denies any pain or discomfort. Return to room where pt dressed her self. IV sites dcd X2. Company in room to visit.

## 2021-05-21 NOTE — DISCHARGE SUMMARY
Discharge Summary       PATIENT ID: Jyoti Olivarez  MRN: [id number]   YOB: 1955    DATE OF ADMISSION: 5/18/2021  7:06 PM    DATE OF DISCHARGE:   PRIMARY CARE PROVIDER: Azaela Potts MD     ATTENDING PHYSICIAN: Reyes Cough  DISCHARGING PROVIDER: Jesus Lentz      CONSULTATIONS: None    PROCEDURES/SURGERIES: Procedure(s):  PERCUTANEOUS CORONARY INTERVENTION  LEFT HEART CATH / CORONARY ANGIOGRAPHY    ADMITTING DIAGNOSES:    Patient Active Problem List    Diagnosis Date Noted    STEMI involving ot coronary artery of inferior wall ([address] 05/18/2021       DISCHARGE DIAGNOSES / PLAN:      Inferior STEMI total occluded mid RCA static post drug-eluting stent  Hypertensive emergency - resolved  Hyperlipidemia - stable  Acute Kidney Failure - resolved        DISCHARGE MEDICATIONS:  Current Discharge Medication List      START taking these medications    Details   aspirin 81 mg chewable tablet Take 1 Tablet by mouth daily. Qty: 30 Tablet, Refills: 0  Start date: 5/22/2021      atorvastatin (LIPITOR) 80 mg tablet Take 1 Tablet by mouth daily. Qty: 30 Tablet, Refills: 0  Start date: 5/22/2021      losartan (COZAAR) 25 mg tablet Take 1 Tablet by mouth daily. Qty: 30 Tablet, Refills: 0  Start date: 5/22/2021      metoprolol succinate (TOPROL-XL) 25 mg XL tablet Take 1 Tablet by mouth daily. Qty: 30 Tablet, Refills: 0  Start date: 5/22/2021      prasugreL (EFFIENT) 10 mg tablet Take 1 Tablet by mouth daily. Qty: 30 Tablet, Refills: 0  Start date: 5/22/2021    Comments: Start taking 5/22/2021               NOTIFY YOUR PHYSICIAN FOR ANY OF THE FOLLOWING:   Fever over 101 degrees for 24 hours. Chest pain, shortness of breath, fever, chills, nausea, vomiting, diarrhea, change in mentation, falling, weakness, bleeding. Severe pain or pain not relieved by medications. Or, any other signs or symptoms that you may have questions about.     DISPOSITION:  x  Home With:   OT  PT  HH  RN       Long term SNF/Inpatient Rehab   X Independent/assisted living    Hospice    Other:       PATIENT CONDITION AT DISCHARGE: Stable      PHYSICAL EXAMINATION AT DISCHARGE:  General:          Alert, cooperative, no distress, appears stated age. HEENT:           Atraumatic, anicteric sclerae, pink conjunctivae                          No oral ulcers, mucosa moist, throat clear, dentition fair  Neck:               Supple, symmetrical  Lungs:             Clear to auscultation bilaterally. No Wheezing or Rhonchi. No rales. Chest wall:      No tenderness  No Accessory muscle use. Heart:              Regular  rhythm,  No  murmur   No edema  Abdomen:        Soft, non-tender. Not distended. Bowel sounds normal  Extremities:     No cyanosis. No clubbing,                            Skin turgor normal, Capillary refill normal  Skin:                Not pale. Not Jaundiced  No rashes   Psych:             Not anxious or agitated. Neurologic:      Alert, moves all extremities, answers questions appropriately and responds to commands     XR CHEST PORT   Final Result   Underexpanded lungs with mild bibasilar atelectasis. Recent Results (from the past 24 hour(s))   CBC WITH AUTOMATED DIFF    Collection Time: 05/21/21  7:40 AM   Result Value Ref Range    WBC 5.8 3.6 - 11.0 K/uL    RBC 4.78 3.80 - 5.20 M/uL    HGB 13.2 11.5 - 16.0 g/dL    HCT 41.3 35.0 - 47.0 %    MCV 86.4 80.0 - 99.0 FL    MCH 27.6 26.0 - 34.0 PG    MCHC 32.0 30.0 - 36.5 g/dL    RDW 13.0 11.5 - 14.5 %    PLATELET 543 010 - 523 K/uL    MPV 10.8 8.9 - 12.9 FL    NRBC 0.0 0.0  WBC    ABSOLUTE NRBC 0.00 0.00 - 0.01 K/uL    NEUTROPHILS 62 32 - 75 %    LYMPHOCYTES 25 12 - 49 %    MONOCYTES 9 5 - 13 %    EOSINOPHILS 2 0 - 7 %    BASOPHILS 1 0 - 1 %    IMMATURE GRANULOCYTES 1 (H) 0 - 0.5 %    ABS. NEUTROPHILS 3.7 1.8 - 8.0 K/UL    ABS. LYMPHOCYTES 1.5 0.8 - 3.5 K/UL    ABS. MONOCYTES 0.5 0.0 - 1.0 K/UL    ABS. EOSINOPHILS 0.1 0.0 - 0.4 K/UL    ABS.  BASOPHILS 0.0 0.0 - 0.1 K/UL    ABS. IMM. GRANS. 0.0 0.00 - 0.04 K/UL    DF AUTOMATED     METABOLIC PANEL, COMPREHENSIVE    Collection Time: 05/21/21  7:40 AM   Result Value Ref Range    Sodium 137 136 - 145 mmol/L    Potassium 3.7 3.5 - 5.1 mmol/L    Chloride 104 97 - 108 mmol/L    CO2 23 21 - 32 mmol/L    Anion gap 10 5 - 15 mmol/L    Glucose 163 (H) 65 - 100 mg/dL    BUN 23 (H) 6 - 20 mg/dL    Creatinine 0.96 0.55 - 1.02 mg/dL    BUN/Creatinine ratio 24 (H) 12 - 20      GFR est AA >60 >60 ml/min/1.73m2    GFR est non-AA 58 (L) >60 ml/min/1.73m2    Calcium 9.3 8.5 - 10.1 mg/dL    Bilirubin, total 0.8 0.2 - 1.0 mg/dL    AST (SGOT) 52 (H) 15 - 37 U/L    ALT (SGPT) 26 12 - 78 U/L    Alk. phosphatase 138 (H) 45 - 117 U/L    Protein, total 7.5 6.4 - 8.2 g/dL    Albumin 3.4 (L) 3.5 - 5.0 g/dL    Globulin 4.1 (H) 2.0 - 4.0 g/dL    A-G Ratio 0.8 (L) 1.1 - 2.2            HOSPITAL COURSE:  69-year-old female past medical history of hypertension [facility]. She had a sudden onset of retrosternal chest pain and heaviness while she was doing yard work. She was started on heparin and Brilinta prior to transfer. Her blood pressure was severely elevated on arrival.  Taken emergently to the Cath Lab and found to have totally occluded mid RCA. She had severe mid to distal left circumflex artery disease as well. She was admitted to the ICU with a full cardiac work-up. Cardiology followed patient throughout admission. She was changed to prasugrel upon discharge due to shortness of breath after starting Brilinta. She will remain on aspirin, atorvastatin. Cardiology is planning on PCI as outpatient. She is to follow-up with them in 2 weeks. She was also started on metoprolol and losartan. At the time of discharge her blood pressure was stable with systolics from 1 18-5 30. Cardiology had cleared the patient for discharge to follow-up in 2 weeks.   Medications were adjusted and prescriptions were written after medication reconciliation was performed. All labs, imaging, progress notes were reviewed prior to discharge. Discussed with Dr. Branden Aguirre who agreed patient was stable for discharge. Patient will be ambulated by nursing prior to leaving to ensure she has no difficulty post catheterization. Troponin was over 3 yesterday and per cardiology note all her labs were reviewed by them. We will have nursing contact cardiology consultation to notify patient is stable for discharge after ambulating and inquire regarding repeat labs needed as outpatient. 35 minutes were spent on patient's discharge with over 50% on collaboration and coordination of care.     Signed:   Radha Pereyra NP  5/21/2021  1:14 PM

## 2021-05-21 NOTE — PROGRESS NOTES
Progress Note    Patient: Isis Palacios MRN: [id number]  SSN: [id number]    YOB: 1955  Age: 78 y.o. Sex: female      Admit Date: 5/18/2021    LOS: 3 days     Subjective:     Patient was transferred out of the ICU today. She denied having any chest pain anyhow she complains of finding herself to does have to take a deep breath. Objective:     Vitals:    05/20/21 1823 05/21/21 0000 05/21/21 0020 05/21/21 0400   BP: (!) 133/91  120/74    Pulse: 90 76 96 70   Resp: 19  18    Temp: 98 °F (36.7 °C)  97.8 °F (36.6 °C)    SpO2: 98%  96%    Weight:       Height:            Intake and Output:  Current Shift: No intake/output data recorded. Last three shifts: 05/19 0701 - 05/20 1900  In: -   Out: 1100 [Urine:1100]    Physical Exam:   General:  Alert, cooperative, no distress, appears stated age. Eyes:  Conjunctivae/corneas clear. PERRL, EOMs intact. Fundi benign   Ears:  Normal TMs and external ear canals both ears. Nose: Nares normal. Septum midline. Mucosa normal. No drainage or sinus tenderness. Mouth/Throat: Lips, mucosa, and tongue normal. Teeth and gums normal.   Neck: Supple, symmetrical, trachea midline, no adenopathy, thyroid: no enlargment/tenderness/nodules, no carotid bruit and no JVD. Back:   Symmetric, no curvature. ROM normal. No CVA tenderness. Lungs:   Clear to auscultation bilaterally. Heart:  Regular rate and rhythm, S1, S2 normal, no murmur, click, rub or gallop. Abdomen:   Soft, non-tender. Bowel sounds normal. No masses,  No organomegaly. Extremities: Extremities normal, atraumatic, no cyanosis or edema. Pulses: 2+ and symmetric all extremities. Skin: Skin color, texture, turgor normal. No rashes or lesions   Lymph nodes: Cervical, supraclavicular, and axillary nodes normal.   Neurologic: CNII-XII intact. Normal strength, sensation and reflexes throughout. Lab/Data Review: All lab results for the last 24 hours reviewed.          Assessment:     Active Problems:    STEMI involving [facility] coronary artery of inferior wall ([address] (5/18/2021)    Patient is a 50-year-old -American female with:  1. Inferior STEMI: Total occluded mid RCA status post drug-eluting stent 3.0 x 26 mm.  2.  Hypertensive emergency  3. Hyperlipidemia       Plan:     Right radial pulses +2 without hematoma. Okay to discharge from cardiac standpoint if she walks around without any difficulty. I opted to switch Brilinta to prasugrel due to these shortness of breath episodes that she is having that might be related to Brilinta. Continue aspirin indefinitely. We will load her with prasugrel 60 mg once now and then upon discharge she will be on 10 mg daily. Continue atorvastatin.   I will see her in 2 weeks in the office or sooner if needed and we will plan for staged PCI of left circumflex artery    Signed By: Simon Quinn MD     May 21, 2021

## 2021-05-21 NOTE — PROGRESS NOTES
Nutrition Education    · Verbally reviewed information with Patient and family  · Educated on CHF MNT  · Written educational materials provided- Heart failure nutrition therapy handout  · Contact name and number provided. · Refer to Patient Education activity for more details. RD spoke to pt and family at bedside. PT seems somewhat hard of hearing and is also difficult to comprehend 2/2 edentulous. Reports trying to eat a heart healthy diet PTA. Diet recall B- applesauce, oatmeal L- fruit D- baked chicken, cabbage, beans. Beverages- water 8 oz/d, ginger ale 8 oz/d. Encouraged pt to increase water intake gradually to 64 oz/d. Discussed hydration status and signs of dehydration. Encouraged pt to check urine and aim for light coloring. RD reviewed heart healthy nutrition therapy. Discussed importance of sodium restriction and food sources. Reviewed importance of weighing self daily to assess weight changes and fluid retention. Reports not on a fluid restriction currently. Reviewed if edema occurs to call PCP. Reviewed heart healthy foods to increase including: whole grains (reviewed sources), low fat dairy, lean proteins, fruits/veggies, healthy fats etc. Reports does not have a lot of dairy. Reviewed foods to avoid for heart health including: whole fat dairy, cured meats, trans and sat fats, processed goods, refined grains and sugar etc. RD gave pt contact info and encouraged to reach out with further questions or concerns.     Electronically signed by Steve Meredith RD on 5/21/2021 at 12:22 PM    Contact Number: Ext [phone]

## 2021-05-21 NOTE — PROGRESS NOTES
Bedside and Verbal shift change report given to [facility] (oncoming nurse) by HOLLY Alston RN (offgoing nurse). Report given with SBAR, Kardex, Intake/Output, MAR and Recent Results.

## 2021-05-21 NOTE — PROGRESS NOTES
All Patients medications have been called into the [facility] in [place] [phone].

## 2021-05-21 NOTE — PROGRESS NOTES
CM acknowledged discharge order. CM notified Dr. Radha Edwards of discharge and yesterdays troponin of 3.67. He has agreed with discharge and we made sure patient is prescribed Prasugrel and to follow up with Cardiology in 2 weeks. Discharge plan of care/case management plan validated with provider discharge order. Primary nurse ambulated patient and patient is safe to go home/self.

## 2021-05-21 NOTE — PROGRESS NOTES
Physician Progress Note      Shara Winkler  CSN #:                  [id number]  :                       1955  ADMIT DATE:       2021 7:06 PM  DISCH DATE:  RESPONDING  PROVIDER #:        ASA Rodriguez MD          QUERY TEXT:    Pt admitted with STEMI. Pt noted to have ELEVATED CR LEVEL on Admission. If possible, please document in the progress notes and discharge summary if you are evaluating and/or treating any of the following: The medical record reflects the following:  Risk Factors: STEMI, CAD, S/P CARDIAC STENT PLACEMENT, HTN EMERGENCY  Clinical Indicators: : CR 1.23, ; CR 0.06. /84, 193/90  Treatment: IV FLUIDS, ASA, LOPRESSOR IV, TOPROL XL, CARDIAC CATH W/ STENT PLACEMENT, LAB MONITORING. Thank you,  MONAE FerrerN, RN, CDI Specialist  [phone] or [email or web address]  Options provided:  -- Acute kidney failure  -- Acute kidney failure with acute tubular necrosis  -- Acute kidney injury  -- Other - I will add my own diagnosis  -- Disagree - Not applicable / Not valid  -- Disagree - Clinically unable to determine / Unknown  -- Refer to Clinical Documentation Reviewer    PROVIDER RESPONSE TEXT:    This patient is in Acute kidney failure.     Query created by: Winnie Hoyt on 2021 4:46 PM      Electronically signed by:  Page Rodriguez MD 2021 12:24 PM

## 2021-05-21 NOTE — PROGRESS NOTES
Problem: Falls - Risk of  Goal: *Absence of Falls  Description: Document Margaret Noel Fall Risk and appropriate interventions in the flowsheet. Outcome: Progressing Towards Goal  Note: Fall Risk Interventions:  Mobility Interventions: Patient to call before getting OOB         Medication Interventions: Assess postural VS orthostatic hypotension         History of Falls Interventions: Bed/chair exit alarm         Problem: Falls - Risk of  Goal: *Absence of Falls  Description: Document Carmen Fall Risk and appropriate interventions in the flowsheet. Outcome: Progressing Towards Goal  Note: Fall Risk Interventions:  Mobility Interventions: Patient to call before getting OOB         Medication Interventions: Assess postural VS orthostatic hypotension         History of Falls Interventions: Bed/chair exit alarm         Problem: Patient Education: Go to Patient Education Activity  Goal: Patient/Family Education  Outcome: Progressing Towards Goal     Problem: Patient Education: Go to Patient Education Activity  Goal: Patient/Family Education  Outcome: Progressing Towards Goal     Problem: Diabetes Self-Management  Goal: *Disease process and treatment process  Description: Define diabetes and identify own type of diabetes; list 3 options for treating diabetes. Outcome: Progressing Towards Goal  Goal: *Incorporating nutritional management into lifestyle  Description: Describe effect of type, amount and timing of food on blood glucose; list 3 methods for planning meals. Outcome: Progressing Towards Goal  Goal: *Incorporating physical activity into lifestyle  Description: State effect of exercise on blood glucose levels. Outcome: Progressing Towards Goal  Goal: *Developing strategies to promote health/change behavior  Description: Define the ABC's of diabetes; identify appropriate screenings, schedule and personal plan for screenings.   Outcome: Progressing Towards Goal  Goal: *Using medications safely  Description: [facility] effect of diabetes medications on diabetes; name diabetes medication taking, action and side effects. Outcome: Progressing Towards Goal  Goal: *Monitoring blood glucose, interpreting and using results  Description: Identify recommended blood glucose targets  and personal targets. Outcome: Progressing Towards Goal  Goal: *Prevention, detection, treatment of acute complications  Description: List symptoms of hyper- and hypoglycemia; describe how to treat low blood sugar and actions for lowering  high blood glucose level. Outcome: Progressing Towards Goal  Goal: *Prevention, detection and treatment of chronic complications  Description: Define the natural course of diabetes and describe the relationship of blood glucose levels to long term complications of diabetes. Outcome: Progressing Towards Goal  Goal: *Developing strategies to address psychosocial issues  Description: Describe feelings about living with diabetes; identify support needed and support network  Outcome: Progressing Towards Goal  Goal: *Insulin pump training  Outcome: Progressing Towards Goal  Goal: *Sick day guidelines  Outcome: Progressing Towards Goal  Goal: *Patient Specific Goal (EDIT GOAL, INSERT TEXT)  Outcome: Progressing Towards Goal     Problem: Diabetes Self-Management  Goal: *Disease process and treatment process  Description: Define diabetes and identify own type of diabetes; list 3 options for treating diabetes. Outcome: Progressing Towards Goal  Goal: *Incorporating nutritional management into lifestyle  Description: Describe effect of type, amount and timing of food on blood glucose; list 3 methods for planning meals. Outcome: Progressing Towards Goal  Goal: *Incorporating physical activity into lifestyle  Description: State effect of exercise on blood glucose levels.   Outcome: Progressing Towards Goal  Goal: *Developing strategies to promote health/change behavior  Description: Define the ABC's of diabetes; identify appropriate screenings, schedule and personal plan for screenings. Outcome: Progressing Towards Goal  Goal: *Using medications safely  Description: State effect of diabetes medications on diabetes; name diabetes medication taking, action and side effects. Outcome: Progressing Towards Goal  Goal: *Monitoring blood glucose, interpreting and using results  Description: Identify recommended blood glucose targets  and personal targets. Outcome: Progressing Towards Goal  Goal: *Prevention, detection, treatment of acute complications  Description: List symptoms of hyper- and hypoglycemia; describe how to treat low blood sugar and actions for lowering  high blood glucose level. Outcome: Progressing Towards Goal  Goal: *Prevention, detection and treatment of chronic complications  Description: Define the natural course of diabetes and describe the relationship of blood glucose levels to long term complications of diabetes.   Outcome: Progressing Towards Goal  Goal: *Developing strategies to address psychosocial issues  Description: Describe feelings about living with diabetes; identify support needed and support network  Outcome: Progressing Towards Goal  Goal: *Insulin pump training  Outcome: Progressing Towards Goal  Goal: *Sick day guidelines  Outcome: Progressing Towards Goal  Goal: *Patient Specific Goal (EDIT GOAL, INSERT TEXT)  Outcome: Progressing Towards Goal     Problem: Patient Education: Go to Patient Education Activity  Goal: Patient/Family Education  Outcome: Progressing Towards Goal     Problem: Patient Education: Go to Patient Education Activity  Goal: Patient/Family Education  Outcome: Progressing Towards Goal

## 2021-05-21 NOTE — DISCHARGE INSTRUCTIONS
Patient Education        Taking Aspirin and Other Antiplatelets Safely: Care Instructions  Your Care Instructions     Aspirin and other antiplatelet medicines help prevent blood clots from forming. They can help some people lower their risk of a heart attack or stroke. But these medicines can also make you more likely to bleed. That's why it's important to talk to your doctor before you start taking aspirin every day. It's not right for everyone. And if you and your doctor decide these medicines are right for you, learn how to take them safely. If you take aspirin, be sure you know how to take it. Your doctor can tell you what dose to take and how often to take it. One low-dose aspirin is 81 milligrams (mg). But the dose for daily aspirin can range from 81 mg to 325 mg. If you take another antiplatelet, take it as prescribed. Follow-up care is a key part of your treatment and safety. Be sure to make and go to all appointments, and call your doctor if you are having problems. It's also a good idea to know your test results and keep a list of the medicines you take. How can you care for yourself at home? · Before you start to take daily aspirin or some other antiplatelet, tell your doctor all the medicines, vitamins, herbal products, and supplements you take. · Tell your doctors, dentist, and pharmacist that you take an antiplatelet. · Take your medicine as your doctor directs. Make sure that you understand exactly what your doctor wants you to do. If another doctor says to stop taking the medicine for any reason, talk to the doctor who prescribed it before you stop. · Take your medicine at the same time every day. · Do not chew or crush the coated or time-release forms of your medicine. · If you miss a dose, don't take an extra dose to make up for it. · Ask your doctor whether you can drink alcohol. And ask how much you can drink.  When you take an antiplatelet, drinking too much raises your risk for liver damage and stomach bleeding. · If you are pregnant, are breastfeeding, or plan to become pregnant, talk to your doctor about what medicines are safe. · Talk with your doctor before you take a pain medicine. Many pain medicines have aspirin. Too much aspirin can be harmful. · Wear medical alert jewelry. This lets others know that you take an antiplatelet. You can buy it at most drugstores. · Try to avoid injuries that might make you bleed. For example, be careful when you exercise and when you play sports. Make your home safe to reduce your risk of falling. When should you call for help? Call 911 anytime you think you may need emergency care. For example, call if:    · You have a sudden, severe headache that is different from past headaches. Call your doctor now or seek immediate medical care if:    · You have any abnormal bleeding, such as:  ? A nosebleed that you can't easily stop. ? Bloody or black stools, or rectal bleeding. ? Bloody or pink urine.     · You feel dizzy or lightheaded or feel like you may faint. Watch closely for changes in your health, and be sure to contact your doctor if you have any problems. Where can you learn more? Go to http://www.gray.com/  Enter K930 in the search box to learn more about \"Taking Aspirin and Other Antiplatelets Safely: Care Instructions. \"  Current as of: August 31, 2020               Content Version: 12.8  © 1032-1927 [employer]. Care instructions adapted under license by [employer] (which disclaims liability or warranty for this information). If you have questions about a medical condition or this instruction, always ask your healthcare professional. [place] [address] any warranty or liability for your use of this information.          Patient Education        Statins: Care Instructions  Your Care Instructions     Statins are medicines that lower your cholesterol and your risk for a heart attack and stroke. Cholesterol is a type of fat in your blood. If you have too much cholesterol, it can build up in blood vessels. This raises your risk of heart disease, heart attack, and stroke. Statins lower cholesterol by blocking how much your body makes. This prevents cholesterol from building up in your blood vessels. This is called hardening of the arteries. It is the starting point for some heart and blood flow problems, such as heart disease. Statins may also reduce inflammation around the buildup (called plaque). This can lower the risk that the plaque will break apart and lead to a heart attack or stroke. A heart-healthy lifestyle is important for lowering your risk whether you take statins or not. This includes eating healthy foods, being active, staying at a healthy weight, and not smoking. You must take statins regularly for them to work well. If you stop, your cholesterol and your risk will go back up. Examples of statins include:  · Atorvastatin (Lipitor). · Lovastatin (Mevacor). · Pravastatin (Pravachol). · Simvastatin (Zocor). Statins interact with many medicines. So tell your doctor all of the other medicines that you take. These include prescription medicines, over-the-counter medicines, dietary supplements, and herbal products. Follow-up care is a key part of your treatment and safety. Be sure to make and go to all appointments, and call your doctor if you are having problems. It's also a good idea to know your test results and keep a list of the medicines you take. How can you care for yourself at home? · Take statins exactly as your doctor tells you. High cholesterol has no symptoms. So it is easy to forget to take the pills. Try to make a system that reminds you to take them. · Do not take two or more medicines at the same time unless the doctor told you to. Statins can interact with other medicines. · Always tell your doctor if you think you are having a side effect.  If side effects are a problem with one medicine, a different one may be used. · Keep making the lifestyle changes your doctor suggests. Eat heart-healthy foods, be active, don't smoke, and stay at a healthy weight. · Talk to your doctor about avoiding grapefruit juice if you take statins. Grapefruit juice can raise the level of this medicine in your blood. This could increase side effects. When should you call for help? Watch closely for changes in your health, and be sure to contact your doctor if:    · You think you are having problems with your medicine.     · You have aches or muscle pain. Where can you learn more? Go to http://www.gray.com/  Enter R358 in the search box to learn more about \"Statins: Care Instructions. \"  Current as of: August 31, 2020               Content Version: 12.8  © 2006-2021 [employer]. Care instructions adapted under license by [employer] (which disclaims liability or warranty for this information). If you have questions about a medical condition or this instruction, always ask your healthcare professional. [place] [address] any warranty or liability for your use of this information. Patient Education        Heart Attack: Care Instructions  Overview     A heart attack is an event that occurs when part of the heart muscle does not get enough blood and oxygen. This part of the heart starts to die. A heart attack is also called a myocardial infarction, or MI. A heart attack most often happens because blood flow through one or more of the coronary arteries is blocked. This blockage is usually caused by a blood clot that forms when plaque in the artery breaks open. After a heart attack, you may be worried about your future. Over the next several weeks, your heart will start to heal. Though it can be hard to break old habits, you can reduce your risk of having another heart attack.  You can do this by making some lifestyle changes and by taking medicines. Follow-up care is a key part of your treatment and safety. Be sure to make and go to all appointments, and call your doctor if you are having problems. It's also a good idea to know your test results and keep a list of the medicines you take. How can you care for yourself at home? Activity    · Until your doctor says it is okay, do not do strenuous exercise. And do not lift, pull, or push anything heavy. Ask your doctor what types of activities are safe for you.     · If your doctor has not set you up with a cardiac rehabilitation (rehab) program, talk to him or her about whether that is right for you. Cardiac rehab includes supervised exercise. It also includes help with diet and lifestyle changes and emotional support. It may reduce your risk of future heart problems.     · Increase your activities slowly. Take short rest breaks when you get tired.     · If your doctor recommends it, get more exercise. Walking is a good choice. Bit by bit, increase the amount you walk every day. Try for at least 30 minutes on most days of the week. You also may want to swim, bike, or do other activities. Talk with your doctor before you start an exercise program to make sure it is safe for you.     · Ask your doctor when you can drive, go back to work, and do other daily activities again.     · You can have sex as soon as you feel ready for it. Often this means when you can easily walk around or climb stairs. Talk with your doctor if you have any concerns. If you are taking nitroglycerin, do not take erection-enhancing medicine such as sildenafil (Viagra), tadalafil (Cialis), or vardenafil (Levitra) . Lifestyle changes    · Do not smoke. Smoking increases your risk of another heart attack. If you need help quitting, talk to your doctor about stop-smoking programs and medicines.  These can increase your chances of quitting for good.     · Eat a heart-healthy diet that is low in saturated fat and salt, and is full of fruits, vegetables and whole-grains. You may get more details about how to eat healthy. But these tips can help you get started.     · Stay at a healthy weight, or lose weight if you need to. Medicines    · Be safe with medicines. Take your medicines exactly as prescribed. Call your doctor if you think you are having a problem with your medicine. You will get more details on the specific medicines your doctor prescribes. Do not stop taking your medicine unless your doctor tells you to. Not taking your medicine might raise your risk of having another heart attack.     · You may need several medicines to help lower your risk of another heart attack. These include:  ? Blood pressure medicines such as angiotensin-converting enzyme (ACE) inhibitors, ARBs (angiotensin II receptor blockers), and beta-blockers. ? Cholesterol medicine called statins. ? Aspirin and other blood thinners. These prevent blood clots that can cause a heart attack.     · If your doctor has given you nitroglycerin, keep it with you at all times. If you have angina symptoms, such as chest pain or pressure, sit down and rest. Take the first dose of nitroglycerin as directed. If symptoms get worse or are not getting better within 5 minutes, call 911 right away. Stay on the phone. The emergency  will tell you what to do.     · Do not take any over-the-counter medicines, vitamins, or herbal products without talking to your doctor first.   Staying healthy    · Manage other health conditions such as high blood pressure and diabetes.     · Avoid colds and flu. Get a pneumococcal vaccine shot. If you have had one before, ask your doctor whether you need another dose. Get the flu vaccine every year. If you must be around people with colds or flu, wash your hands often.     · Be sure to tell your doctor about any angina symptoms you have had, even if they went away. Pay attention to your angina symptoms.  Know what is typical for you and learn how to control it. Know when to call for help.     · Talk to your family, friends, or a counselor about your feelings. It is normal to feel frightened, angry, hopeless, helpless, and even guilty. Talking openly about bad feelings can help you cope. If you have symptoms of depression, talk to your doctor. When should you call for help? Call 911 anytime you think you may need emergency care. For example, call if:    · You have symptoms of a heart attack. These may include:  ? Chest pain or pressure, or a strange feeling in the chest.  ? Sweating. ? Shortness of breath. ? Nausea or vomiting. ? Pain, pressure, or a strange feeling in the back, neck, jaw, or upper belly or in one or both shoulders or arms. ? Lightheadedness or sudden weakness. ? A fast or irregular heartbeat. After you call 911, the  may tell you to chew 1 adult-strength or 2 to 4 low-dose aspirin. Wait for an ambulance. Do not try to drive yourself.     · You have angina symptoms (such as chest pain or pressure) that do not go away with rest or are not getting better within 5 minutes after you take a dose of nitroglycerin.     · You passed out (lost consciousness).     · You feel like you are having another heart attack. Call your doctor now or seek immediate medical care if:    · You are having angina symptoms, such as chest pain or pressure, more often than usual, or the symptoms are different or worse than usual.     · You have new or increased shortness of breath.     · You are dizzy or lightheaded, or you feel like you may faint. Watch closely for changes in your health, and be sure to contact your doctor if you have any problems. Where can you learn more? Go to http://www.gray.com/  Enter H564 in the search box to learn more about \"Heart Attack: Care Instructions. \"  Current as of: August 31, 2020               Content Version: 12.8  © 5944-1529 [employer]. Care instructions adapted under license by [employer] (which disclaims liability or warranty for this information). If you have questions about a medical condition or this instruction, always ask your healthcare professional. [address] any warranty or liability for your use of this information.

## 2021-05-23 LAB
GLUCOSE BLD STRIP.AUTO-MCNC: 137 MG/DL (ref 65–117)
GLUCOSE BLD STRIP.AUTO-MCNC: 151 MG/DL (ref 65–117)
PERFORMED BY, TECHID: ABNORMAL
PERFORMED BY, TECHID: ABNORMAL

## 2021-05-30 ENCOUNTER — HOSPITAL ENCOUNTER (EMERGENCY)
Age: 66
Discharge: HOME | End: 2021-05-30
Attending: EMERGENCY MEDICINE
Payer: MEDICARE

## 2021-05-30 ENCOUNTER — HOSPITAL ENCOUNTER (EMERGENCY)
Age: 66
Discharge: ANOTHER HOSPITAL | End: 2021-05-31
Attending: EMERGENCY MEDICINE
Payer: MEDICARE

## 2021-05-30 VITALS
HEIGHT: 67 IN | WEIGHT: 152 LBS | BODY MASS INDEX: 23.86 KG/M2 | OXYGEN SATURATION: 99 % | TEMPERATURE: 97.9 F | DIASTOLIC BLOOD PRESSURE: 74 MMHG | RESPIRATION RATE: 18 BRPM | SYSTOLIC BLOOD PRESSURE: 148 MMHG | HEART RATE: 84 BPM

## 2021-05-30 DIAGNOSIS — K92.1 MELENA: Primary | ICD-10-CM

## 2021-05-30 DIAGNOSIS — K92.2 GASTROINTESTINAL HEMORRHAGE, UNSPECIFIED GASTROINTESTINAL HEMORRHAGE TYPE: Primary | ICD-10-CM

## 2021-05-30 LAB
ALBUMIN SERPL-MCNC: 3.3 G/DL (ref 3.5–5)
ALBUMIN/GLOB SERPL: 0.8 {RATIO} (ref 1.1–2.2)
ALP SERPL-CCNC: 142 U/L (ref 45–117)
ALT SERPL-CCNC: 22 U/L (ref 12–78)
ANION GAP SERPL CALC-SCNC: 7 MMOL/L (ref 5–15)
AST SERPL W P-5'-P-CCNC: 18 U/L (ref 15–37)
BASOPHILS # BLD: 0 K/UL (ref 0–0.2)
BASOPHILS NFR BLD: 1 % (ref 0–2.5)
BILIRUB SERPL-MCNC: 0.5 MG/DL (ref 0.2–1)
BUN SERPL-MCNC: 21 MG/DL (ref 6–20)
BUN/CREAT SERPL: 23 (ref 12–20)
CA-I BLD-MCNC: 9 MG/DL (ref 8.5–10.1)
CHLORIDE SERPL-SCNC: 104 MMOL/L (ref 97–108)
CO2 SERPL-SCNC: 30 MMOL/L (ref 21–32)
CREAT SERPL-MCNC: 0.93 MG/DL (ref 0.55–1.02)
EOSINOPHIL # BLD: 0.1 K/UL (ref 0–0.7)
EOSINOPHIL NFR BLD: 2 % (ref 0.9–2.9)
ERYTHROCYTE [DISTWIDTH] IN BLOOD BY AUTOMATED COUNT: 13.9 % (ref 11.5–14.5)
GLOBULIN SER CALC-MCNC: 4.2 G/DL (ref 2–4)
GLUCOSE BLD STRIP.AUTO-MCNC: 163 MG/DL (ref 65–117)
GLUCOSE SERPL-MCNC: 135 MG/DL (ref 65–100)
HCT VFR BLD AUTO: 35.9 % (ref 36–46)
HGB BLD-MCNC: 12 G/DL (ref 13.5–17.5)
LYMPHOCYTES # BLD: 1.6 K/UL (ref 1–4.8)
LYMPHOCYTES NFR BLD: 31 % (ref 20.5–51.1)
MCH RBC QN AUTO: 28.7 PG (ref 31–34)
MCHC RBC AUTO-ENTMCNC: 33.6 G/DL (ref 31–36)
MCV RBC AUTO: 85.6 FL (ref 80–100)
MONOCYTES # BLD: 0.4 K/UL (ref 0.2–2.4)
MONOCYTES NFR BLD: 8 % (ref 1.7–9.3)
NEUTS SEG # BLD: 3 K/UL (ref 1.8–7.7)
NEUTS SEG NFR BLD: 58 % (ref 42–75)
NRBC # BLD: 0.01 K/UL
NRBC BLD-RTO: 0.1 PER 100 WBC
PERFORMED BY, TECHID: ABNORMAL
PLATELET # BLD AUTO: 279 K/UL (ref 150–400)
PMV BLD AUTO: 8.7 FL (ref 6.5–11.5)
POTASSIUM SERPL-SCNC: 4.1 MMOL/L (ref 3.5–5.1)
PROT SERPL-MCNC: 7.5 G/DL (ref 6.4–8.2)
RBC # BLD AUTO: 4.19 M/UL (ref 4.5–5.9)
SODIUM SERPL-SCNC: 141 MMOL/L (ref 136–145)
WBC # BLD AUTO: 5.2 K/UL (ref 4.4–11.3)

## 2021-05-30 PROCEDURE — 82962 GLUCOSE BLOOD TEST: CPT

## 2021-05-30 PROCEDURE — 80053 COMPREHEN METABOLIC PANEL: CPT

## 2021-05-30 PROCEDURE — 36415 COLL VENOUS BLD VENIPUNCTURE: CPT

## 2021-05-30 PROCEDURE — 85025 COMPLETE CBC W/AUTO DIFF WBC: CPT

## 2021-05-30 PROCEDURE — 99282 EMERGENCY DEPT VISIT SF MDM: CPT

## 2021-05-30 PROCEDURE — 99285 EMERGENCY DEPT VISIT HI MDM: CPT

## 2021-05-30 PROCEDURE — 93005 ELECTROCARDIOGRAM TRACING: CPT

## 2021-05-30 RX ORDER — FLUOCINOLONE ACETONIDE 0.11 MG/ML
OIL AURICULAR (OTIC)
COMMUNITY
Start: 2021-05-05 | End: 2022-01-14

## 2021-05-30 RX ORDER — SPIRONOLACTONE AND HYDROCHLOROTHIAZIDE 25; 25 MG/1; MG/1
TABLET ORAL
COMMUNITY
Start: 2021-04-21 | End: 2022-01-14

## 2021-05-30 RX ORDER — PIOGLITAZONEHYDROCHLORIDE 30 MG/1
15 TABLET ORAL 2 TIMES DAILY
Status: ON HOLD | COMMUNITY
Start: 2021-04-21 | End: 2022-09-30

## 2021-05-30 NOTE — ED PROVIDER NOTES
HPI   Patient was recently seen in this ER and transferred for STEMI. She had successful cardiac catheterization was had an uneventful recovery. Patient reports one episode of darker stools today. She denies weakness, near syncope, abdominal or rectal pain, hematochezia. Past Medical History:   Diagnosis Date    Hypertension     Menopause        History reviewed. No pertinent surgical history. History reviewed. No pertinent family history. Social History     Socioeconomic History    Marital status:      Spouse name: Not on file    Number of children: Not on file    Years of education: Not on file    Highest education level: Not on file   Occupational History    Not on file   Tobacco Use    Smoking status: Never Smoker    Smokeless tobacco: Never Used   Substance and Sexual Activity    Alcohol use: Not Currently    Drug use: Not Currently    Sexual activity: Not on file   Other Topics Concern    Not on file   Social History Narrative    Not on file     Social Determinants of Health     Financial Resource Strain:     Difficulty of Paying Living Expenses:    Food Insecurity:     Worried About Running Out of Food in the Last Year:     [address] in the Last Year:    Transportation Needs:     Lack of Transportation (Medical):  Lack of Transportation (Non-Medical):    Physical Activity:     Days of Exercise per Week:     Minutes of Exercise per Session:    Stress:     Feeling of Stress :    Social Connections:     Frequency of Communication with Friends and Family:     Frequency of Social Gatherings with Friends and Family:     Attends [employer] Services:     Active Member of Clubs or Organizations:     Attends Club or Organization Meetings:     Marital Status:    Intimate Partner Violence:     Fear of Current or Ex-Partner:     Emotionally Abused:     Physically Abused:     Sexually Abused: ALLERGIES: Patient has no known allergies.     Review of Systems Constitutional: Negative. HENT: Negative. Eyes: Negative. Respiratory: Negative. Cardiovascular: Negative. Gastrointestinal: Positive for blood in stool. Endocrine: Negative. Genitourinary: Negative. Musculoskeletal: Negative. Allergic/Immunologic: Negative. Neurological: Negative. Hematological: Negative. Psychiatric/Behavioral: Negative. All other systems reviewed and are negative. Vitals:    05/30/21 1712   BP: (!) 128/107   Pulse: 94   Resp: 20   Temp: 97.9 °F (36.6 °C)   SpO2: 100%   Weight: 68.9 kg (152 lb)   Height: 5' 7\" (1.702 m)            Physical Exam  Vitals reviewed. Constitutional:       General: She is not in acute distress. Appearance: Normal appearance. She is normal weight. She is not ill-appearing, toxic-appearing or diaphoretic. HENT:      Head: Normocephalic and atraumatic. Nose: Nose normal.      Mouth/Throat:      Mouth: Mucous membranes are moist.   Eyes:      Extraocular Movements: Extraocular movements intact. Conjunctiva/sclera: Conjunctivae normal.      Pupils: Pupils are equal, round, and reactive to light. Cardiovascular:      Rate and Rhythm: Normal rate and regular rhythm. Heart sounds: Normal heart sounds. Pulmonary:      Effort: Pulmonary effort is normal. No respiratory distress. Abdominal:      General: Abdomen is flat. There is no distension. Palpations: Abdomen is soft. There is no mass. Tenderness: There is no abdominal tenderness. There is no guarding or rebound. Hernia: No hernia is present. Musculoskeletal:         General: No swelling, tenderness, deformity or signs of injury. Normal range of motion. Cervical back: Normal range of motion. Right lower leg: No edema. Left lower leg: No edema. Skin:     General: Skin is warm and dry. Coloration: Skin is not pale. Neurological:      General: No focal deficit present.       Mental Status: She is alert and oriented to person, place, and time. Mental status is at baseline. Cranial Nerves: No cranial nerve deficit. Sensory: No sensory deficit. Motor: No weakness. Coordination: Coordination normal.      Gait: Gait normal.   Psychiatric:         Mood and Affect: Mood normal.         Behavior: Behavior normal.          MDM     Patient is very well-appearing with reassuring vital signs and exam.  This is likely melena due to recent anticoagulation. Will check labs and orthostatics and if reassuring, okay for outpatient management.   Procedures

## 2021-05-31 ENCOUNTER — APPOINTMENT (OUTPATIENT)
Dept: CT IMAGING | Age: 66
DRG: 377 | End: 2021-05-31
Attending: INTERNAL MEDICINE
Payer: MEDICARE

## 2021-05-31 ENCOUNTER — HOSPITAL ENCOUNTER (INPATIENT)
Age: 66
LOS: 1 days | Discharge: HOME | DRG: 377 | End: 2021-06-02
Attending: STUDENT IN AN ORGANIZED HEALTH CARE EDUCATION/TRAINING PROGRAM | Admitting: INTERNAL MEDICINE
Payer: MEDICARE

## 2021-05-31 VITALS
RESPIRATION RATE: 16 BRPM | HEART RATE: 72 BPM | DIASTOLIC BLOOD PRESSURE: 75 MMHG | BODY MASS INDEX: 23.86 KG/M2 | TEMPERATURE: 98.1 F | OXYGEN SATURATION: 100 % | WEIGHT: 152 LBS | HEIGHT: 67 IN | SYSTOLIC BLOOD PRESSURE: 141 MMHG

## 2021-05-31 LAB
ALBUMIN SERPL-MCNC: 3.2 G/DL (ref 3.5–5)
ALBUMIN/GLOB SERPL: 0.8 {RATIO} (ref 1.1–2.2)
ALP SERPL-CCNC: 140 U/L (ref 45–117)
ALT SERPL-CCNC: 22 U/L (ref 12–78)
ANION GAP SERPL CALC-SCNC: 7 MMOL/L (ref 5–15)
AST SERPL W P-5'-P-CCNC: 29 U/L (ref 15–37)
BASOPHILS # BLD: 0.1 K/UL (ref 0–0.2)
BASOPHILS NFR BLD: 1 % (ref 0–2.5)
BILIRUB SERPL-MCNC: 0.6 MG/DL (ref 0.2–1)
BUN SERPL-MCNC: 20 MG/DL (ref 6–20)
BUN/CREAT SERPL: 21 (ref 12–20)
CA-I BLD-MCNC: 8.9 MG/DL (ref 8.5–10.1)
CHLORIDE SERPL-SCNC: 106 MMOL/L (ref 97–108)
CO2 SERPL-SCNC: 29 MMOL/L (ref 21–32)
COVID-19 RAPID TEST, COVR: NOT DETECTED
CREAT SERPL-MCNC: 0.94 MG/DL (ref 0.55–1.02)
EOSINOPHIL # BLD: 0.1 K/UL (ref 0–0.7)
EOSINOPHIL NFR BLD: 2 % (ref 0.9–2.9)
ERYTHROCYTE [DISTWIDTH] IN BLOOD BY AUTOMATED COUNT: 14 % (ref 11.5–14.5)
GLOBULIN SER CALC-MCNC: 4.1 G/DL (ref 2–4)
GLUCOSE BLD STRIP.AUTO-MCNC: 143 MG/DL (ref 65–117)
GLUCOSE BLD STRIP.AUTO-MCNC: 165 MG/DL (ref 65–117)
GLUCOSE SERPL-MCNC: 166 MG/DL (ref 65–100)
HCT VFR BLD AUTO: 29.5 % (ref 35–47)
HCT VFR BLD AUTO: 35.2 % (ref 36–46)
HEMOCCULT STL QL: POSITIVE
HGB BLD-MCNC: 11.7 G/DL (ref 13.5–17.5)
HGB BLD-MCNC: 9.3 G/DL (ref 11.5–16)
LYMPHOCYTES # BLD: 1.5 K/UL (ref 1–4.8)
LYMPHOCYTES NFR BLD: 24 % (ref 20.5–51.1)
MCH RBC QN AUTO: 28.6 PG (ref 31–34)
MCHC RBC AUTO-ENTMCNC: 33.4 G/DL (ref 31–36)
MCV RBC AUTO: 85.8 FL (ref 80–100)
MONOCYTES # BLD: 0.4 K/UL (ref 0.2–2.4)
MONOCYTES NFR BLD: 7 % (ref 1.7–9.3)
NEUTS SEG # BLD: 4.1 K/UL (ref 1.8–7.7)
NEUTS SEG NFR BLD: 66 % (ref 42–75)
NRBC # BLD: 0 K/UL
NRBC BLD-RTO: 0.1 PER 100 WBC
PLATELET # BLD AUTO: 271 K/UL (ref 150–400)
PMV BLD AUTO: 8.9 FL (ref 6.5–11.5)
POTASSIUM SERPL-SCNC: 4.2 MMOL/L (ref 3.5–5.1)
PROT SERPL-MCNC: 7.3 G/DL (ref 6.4–8.2)
RBC # BLD AUTO: 4.1 M/UL (ref 4.5–5.9)
SERVICE CMNT-IMP: ABNORMAL
SERVICE CMNT-IMP: ABNORMAL
SODIUM SERPL-SCNC: 142 MMOL/L (ref 136–145)
SOURCE, COVRS: NORMAL
TROPONIN I SERPL-MCNC: <0.05 NG/ML
TROPONIN I SERPL-MCNC: <0.05 NG/ML
WBC # BLD AUTO: 6.2 K/UL (ref 4.4–11.3)

## 2021-05-31 PROCEDURE — 96376 TX/PRO/DX INJ SAME DRUG ADON: CPT

## 2021-05-31 PROCEDURE — 86870 RBC ANTIBODY IDENTIFICATION: CPT

## 2021-05-31 PROCEDURE — C9113 INJ PANTOPRAZOLE SODIUM, VIA: HCPCS | Performed by: INTERNAL MEDICINE

## 2021-05-31 PROCEDURE — 74011250636 HC RX REV CODE- 250/636: Performed by: INTERNAL MEDICINE

## 2021-05-31 PROCEDURE — 74011250637 HC RX REV CODE- 250/637: Performed by: INTERNAL MEDICINE

## 2021-05-31 PROCEDURE — 70450 CT HEAD/BRAIN W/O DYE: CPT

## 2021-05-31 PROCEDURE — 82962 GLUCOSE BLOOD TEST: CPT

## 2021-05-31 PROCEDURE — 84484 ASSAY OF TROPONIN QUANT: CPT

## 2021-05-31 PROCEDURE — 74011000250 HC RX REV CODE- 250: Performed by: EMERGENCY MEDICINE

## 2021-05-31 PROCEDURE — 87635 SARS-COV-2 COVID-19 AMP PRB: CPT

## 2021-05-31 PROCEDURE — 36415 COLL VENOUS BLD VENIPUNCTURE: CPT

## 2021-05-31 PROCEDURE — 65660000000 HC RM CCU STEPDOWN

## 2021-05-31 PROCEDURE — 80053 COMPREHEN METABOLIC PANEL: CPT

## 2021-05-31 PROCEDURE — 86901 BLOOD TYPING SEROLOGIC RH(D): CPT

## 2021-05-31 PROCEDURE — 96374 THER/PROPH/DIAG INJ IV PUSH: CPT

## 2021-05-31 PROCEDURE — 74011250637 HC RX REV CODE- 250/637: Performed by: STUDENT IN AN ORGANIZED HEALTH CARE EDUCATION/TRAINING PROGRAM

## 2021-05-31 PROCEDURE — 82272 OCCULT BLD FECES 1-3 TESTS: CPT

## 2021-05-31 PROCEDURE — 74011250636 HC RX REV CODE- 250/636: Performed by: EMERGENCY MEDICINE

## 2021-05-31 PROCEDURE — 85025 COMPLETE CBC W/AUTO DIFF WBC: CPT

## 2021-05-31 PROCEDURE — C9113 INJ PANTOPRAZOLE SODIUM, VIA: HCPCS | Performed by: EMERGENCY MEDICINE

## 2021-05-31 PROCEDURE — 74011000250 HC RX REV CODE- 250: Performed by: INTERNAL MEDICINE

## 2021-05-31 PROCEDURE — 85018 HEMOGLOBIN: CPT

## 2021-05-31 RX ORDER — ACETAMINOPHEN 650 MG/1
650 SUPPOSITORY RECTAL
Status: DISCONTINUED | OUTPATIENT
Start: 2021-05-31 | End: 2021-06-02 | Stop reason: HOSPADM

## 2021-05-31 RX ORDER — POLYETHYLENE GLYCOL 3350 17 G/17G
17 POWDER, FOR SOLUTION ORAL DAILY PRN
Status: DISCONTINUED | OUTPATIENT
Start: 2021-05-31 | End: 2021-06-02 | Stop reason: HOSPADM

## 2021-05-31 RX ORDER — SODIUM CHLORIDE 0.9 % (FLUSH) 0.9 %
5-40 SYRINGE (ML) INJECTION EVERY 8 HOURS
Status: DISCONTINUED | OUTPATIENT
Start: 2021-05-31 | End: 2021-06-02 | Stop reason: HOSPADM

## 2021-05-31 RX ORDER — LOSARTAN POTASSIUM 25 MG/1
25 TABLET ORAL DAILY
Status: DISCONTINUED | OUTPATIENT
Start: 2021-05-31 | End: 2021-06-02 | Stop reason: HOSPADM

## 2021-05-31 RX ORDER — SODIUM CHLORIDE 9 MG/ML
125 INJECTION, SOLUTION INTRAVENOUS ONCE
Status: COMPLETED | OUTPATIENT
Start: 2021-05-31 | End: 2021-05-31

## 2021-05-31 RX ORDER — MAGNESIUM SULFATE 100 %
4 CRYSTALS MISCELLANEOUS AS NEEDED
Status: DISCONTINUED | OUTPATIENT
Start: 2021-05-31 | End: 2021-06-02 | Stop reason: HOSPADM

## 2021-05-31 RX ORDER — PROMETHAZINE HYDROCHLORIDE 25 MG/1
12.5 TABLET ORAL
Status: DISCONTINUED | OUTPATIENT
Start: 2021-05-31 | End: 2021-06-02 | Stop reason: HOSPADM

## 2021-05-31 RX ORDER — METOPROLOL SUCCINATE 25 MG/1
25 TABLET, EXTENDED RELEASE ORAL DAILY
Status: DISCONTINUED | OUTPATIENT
Start: 2021-05-31 | End: 2021-06-02 | Stop reason: HOSPADM

## 2021-05-31 RX ORDER — ATORVASTATIN CALCIUM 40 MG/1
80 TABLET, FILM COATED ORAL DAILY
Status: DISCONTINUED | OUTPATIENT
Start: 2021-05-31 | End: 2021-06-02 | Stop reason: HOSPADM

## 2021-05-31 RX ORDER — ONDANSETRON 2 MG/ML
4 INJECTION INTRAMUSCULAR; INTRAVENOUS
Status: DISCONTINUED | OUTPATIENT
Start: 2021-05-31 | End: 2021-06-02 | Stop reason: HOSPADM

## 2021-05-31 RX ORDER — GUAIFENESIN 100 MG/5ML
81 LIQUID (ML) ORAL DAILY
Status: DISCONTINUED | OUTPATIENT
Start: 2021-05-31 | End: 2021-06-02 | Stop reason: HOSPADM

## 2021-05-31 RX ORDER — DEXTROSE 50 % IN WATER (D50W) INTRAVENOUS SYRINGE
12.5-25 AS NEEDED
Status: DISCONTINUED | OUTPATIENT
Start: 2021-05-31 | End: 2021-06-02 | Stop reason: HOSPADM

## 2021-05-31 RX ORDER — ACETAMINOPHEN 325 MG/1
650 TABLET ORAL
Status: DISCONTINUED | OUTPATIENT
Start: 2021-05-31 | End: 2021-06-02 | Stop reason: HOSPADM

## 2021-05-31 RX ORDER — PRASUGREL 10 MG/1
10 TABLET, FILM COATED ORAL DAILY
Status: DISCONTINUED | OUTPATIENT
Start: 2021-05-31 | End: 2021-06-02 | Stop reason: HOSPADM

## 2021-05-31 RX ORDER — INSULIN LISPRO 100 [IU]/ML
INJECTION, SOLUTION INTRAVENOUS; SUBCUTANEOUS
Status: DISCONTINUED | OUTPATIENT
Start: 2021-05-31 | End: 2021-06-02 | Stop reason: HOSPADM

## 2021-05-31 RX ORDER — SODIUM CHLORIDE 0.9 % (FLUSH) 0.9 %
5-40 SYRINGE (ML) INJECTION AS NEEDED
Status: DISCONTINUED | OUTPATIENT
Start: 2021-05-31 | End: 2021-06-02 | Stop reason: HOSPADM

## 2021-05-31 RX ADMIN — HYDROCHLOROTHIAZIDE: 25 TABLET ORAL at 09:19

## 2021-05-31 RX ADMIN — ASPIRIN 81 MG: 81 TABLET, CHEWABLE ORAL at 12:08

## 2021-05-31 RX ADMIN — PRASUGREL 10 MG: 10 TABLET, FILM COATED ORAL at 12:08

## 2021-05-31 RX ADMIN — SODIUM CHLORIDE 40 MG: 9 INJECTION INTRAMUSCULAR; INTRAVENOUS; SUBCUTANEOUS at 21:21

## 2021-05-31 RX ADMIN — METOPROLOL SUCCINATE 25 MG: 25 TABLET, EXTENDED RELEASE ORAL at 09:19

## 2021-05-31 RX ADMIN — SODIUM CHLORIDE 125 ML/HR: 9 INJECTION, SOLUTION INTRAVENOUS at 00:32

## 2021-05-31 RX ADMIN — Medication 10 ML: at 21:21

## 2021-05-31 RX ADMIN — SODIUM CHLORIDE 40 MG: 9 INJECTION INTRAMUSCULAR; INTRAVENOUS; SUBCUTANEOUS at 09:19

## 2021-05-31 RX ADMIN — ATORVASTATIN CALCIUM 80 MG: 40 TABLET, FILM COATED ORAL at 09:19

## 2021-05-31 RX ADMIN — PANTOPRAZOLE SODIUM 40 MG: 40 INJECTION, POWDER, FOR SOLUTION INTRAVENOUS at 00:31

## 2021-05-31 RX ADMIN — LOSARTAN POTASSIUM 25 MG: 25 TABLET, FILM COATED ORAL at 09:19

## 2021-05-31 NOTE — ED TRIAGE NOTES
Patient presents to ED, after being discharged earlier today, s/p syncopal episode at home. Patient was seen in the ED earlier today for dark stools. Patient reports when she passed out she struck the right side of her chest wall and is reporting pain with palpation.

## 2021-05-31 NOTE — PROGRESS NOTES
Bedside and Verbal shift change report given to Di Radford (oncoming nurse) by Suzanne Reyes (offgoing nurse). Report included the following information SBAR, Kardex, ED Summary, Intake/Output, MAR, Accordion and Recent Results.

## 2021-05-31 NOTE — CONSULTS
[facility] Cardiology Consult Note    Date of consult:  05/31/21  Date of admission: 5/31/2021  Primary Cardiologist: Dr Erick Torres  Physician Requesting consult: Dr Domo Avery / Reason for consult: Syncope, recent AMI    History of the presenting illness:  Chuckie Archuleta is a 78 y.o. F who was admitted after an episode of melena followed by syncope. She had some associated nausea. Denies chest pain prior to this episode, but says her chest wall is a little sore after the fall. She was just admitted to [facility] on 5/19 with chest pain and was found to have an acute MI. Taken to cath lab emergently by Dr Erick Torres and was found to have an occluded mid RCA as the culprit lesion. Primary PCI was performed with implantation of a 3.0 x 26mm BARNDON. She was discharged on ASA and Effient. Past Medical History:   Diagnosis Date    Hypertension     Menopause        Prior to Admission medications    Medication Sig Start Date End Date Taking? Authorizing Provider   fluocinolone acetonide oiL 0.01 % drop  5/5/21   Other, MD Julissa   pioglitazone (ACTOS) 30 mg tablet  4/21/21   Other, MD Julissa   spironolactone-hydrochlorothiazide (ALDACTAZIDE) 25-25 mg per tablet  4/21/21   Other, MD Julissa   aspirin 81 mg chewable tablet Take 1 Tablet by mouth daily. 5/22/21   Darryl Lentz MD   atorvastatin (LIPITOR) 80 mg tablet Take 1 Tablet by mouth daily. 5/22/21   Darryl Lentz MD   losartan (COZAAR) 25 mg tablet Take 1 Tablet by mouth daily. 5/22/21   Darryl Lentz MD   metoprolol succinate (TOPROL-XL) 25 mg XL tablet Take 1 Tablet by mouth daily. 5/22/21   Darryl Lentz MD   prasugreL (EFFIENT) 10 mg tablet Take 1 Tablet by mouth daily. 5/22/21   Moises Kwok MD       No Known Allergies     No family history on file.     Social History     Socioeconomic History    Marital status:      Spouse name: Not on file    Number of children: Not on file    Years of education: Not on file    Highest education level: Not on file   Occupational History    Not on file   Tobacco Use    Smoking status: Never Smoker    Smokeless tobacco: Never Used   Substance and Sexual Activity    Alcohol use: Not Currently    Drug use: Not Currently    Sexual activity: Not on file   Other Topics Concern    Not on file   Social History Narrative    Not on file     Social Determinants of Health     Financial Resource Strain:     Difficulty of Paying Living Expenses:    Food Insecurity:     Worried About Running Out of Food in the Last Year:     [address] in the Last Year:    Transportation Needs:     Lack of Transportation (Medical):  Lack of Transportation (Non-Medical):    Physical Activity:     Days of Exercise per Week:     Minutes of Exercise per Session:    Stress:     Feeling of Stress :    Social Connections:     Frequency of Communication with Friends and Family:     Frequency of Social Gatherings with Friends and Family:     Attends Temple Services:     Active Member of Clubs or Organizations:     Attends Club or Organization Meetings:     Marital Status:    Intimate Partner Violence:     Fear of Current or Ex-Partner:     Emotionally Abused:     Physically Abused:     Sexually Abused:        Review of Systems   Constitutional: Negative for chills and fever. HENT: Negative for hearing loss and nosebleeds. Eyes: Negative for blurred vision and double vision. Respiratory: Negative for cough and sputum production. Gastrointestinal: Positive for melena. Negative for abdominal pain, nausea and vomiting. Genitourinary: Negative for frequency and urgency. Musculoskeletal: Negative for back pain and joint pain. Skin: Negative for itching and rash. Neurological: Positive for loss of consciousness. Negative for dizziness and headaches. Endo/Heme/Allergies: Negative for environmental allergies. Does not bruise/bleed easily.        Visit Vitals  /61 (BP 1 Location: Right upper arm, BP Patient Position: At rest)   Pulse 75   Temp 98 °F (36.7 °C)   Resp 17   SpO2 100%     Physical Exam  HENT:      Head: Normocephalic and atraumatic. Eyes:      General: No scleral icterus. Conjunctiva/sclera: Conjunctivae normal.   Neck:      Vascular: No JVD. Cardiovascular:      Rate and Rhythm: Normal rate and regular rhythm. Heart sounds: Normal heart sounds. No murmur heard. No gallop. Pulmonary:      Effort: Pulmonary effort is normal. No respiratory distress. Breath sounds: Normal breath sounds. No stridor. No wheezing. Abdominal:      General: There is no distension. Palpations: Abdomen is soft. Musculoskeletal:         General: No deformity. Normal range of motion. Skin:     General: Skin is warm and dry. Neurological:      Mental Status: She is alert and oriented to person, place, and time. Psychiatric:         Mood and Affect: Mood and affect normal.         Lab review:  BMP:   No results found for: NA, K, CL, CO2, AGAP, GLU, BUN, CREA, GFRAA, GFRNA     CBC:  Lab Results   Component Value Date/Time    WBC 6.2 05/31/2021 12:04 AM    HGB 11.7 (L) 05/31/2021 12:04 AM    HCT 35.2 (L) 05/31/2021 12:04 AM    PLATELET 213 89/92/6097 12:04 AM    MCV 85.8 05/31/2021 12:04 AM       All Cardiac Markers in the last 24 hours:  No results found for: CPK, CK, CKMMB, CKMB, RCK3, CKMBT, CKMBPOC, CKNDX, CKND1, FERNANDA, TROPT, TROIQ, KRISTY, TROPT, TNIPOC, BNP, BNPP, BNPNT    Lab Results   Component Value Date/Time    Cholesterol, total 187 05/19/2021 04:15 AM    HDL Cholesterol 69 05/19/2021 04:15 AM    LDL, calculated 105.6 (H) 05/19/2021 04:15 AM    VLDL, calculated 12.4 05/19/2021 04:15 AM    Triglyceride 62 05/19/2021 04:15 AM    CHOL/HDL Ratio 2.7 05/19/2021 04:15 AM        Data review:  EKG tracing personally reviewed: NSR with inferior and lateral TW inversions, consistent with recent inferior injury.     Echocardiogram:  05/18/21    ECHO ADULT COMPLETE 05/19/2021 5/19/2021    Interpretation Summary  · LV: Estimated LVEF is 55 - 60%. Normal cavity size and systolic function (ejection fraction normal). Mild concentric hypertrophy. Mild hypokinesis of the basal inferior and mid inferior wall(s). Mild (grade 1) left ventricular diastolic dysfunction. · PA: Pulmonary arterial systolic pressure is 28 mmHg. Signed by: Justina Boss MD on 5/19/2021  2:28 PM        Assessment:    1. Possible upper GI bleed  2. Syncope  3. Recent acute MI on 5/19  4. CAD with recent MI as above, now s/p PCI of the RCA  No acute ischemia  5. 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 HTN    Recommendations / Plan:    Since she had a very recent STEMI and PCI, anti-platelets will need to be continued unless there is life-threatening bleeding  -> continue ASA and Effient  -> continue high dose PPI  -> GI consult  -> monitor H&H closely    High risk situation with no good options other than determining and treating the source of bleeding. Early interruption of DAPT < 2 weeks after acute MI carries a very high risk of stent thrombosis and high risk of death. Signed:  Rizwana HARRISON  [facility]  Interventional Cardiology  05/31/21      Consult received        Will see formally later today

## 2021-05-31 NOTE — ACP (ADVANCE CARE PLANNING)
Advance Care Planning Note      NAME: Nel Stallings   :  1955   MRN:  [id number]     Date/Time:  2021 1:22 PM    Active Diagnoses:   Syncope, POA  Acute melena, POA  Acute upper GI bleed, POA  Acute blood loss anemia, POA  QTC prolongation, POA    These active diagnoses are of sufficient risk that focused discussion on advance care planning is indicated in order to allow the patient to thoughtfully consider personal goals of care, and if situations arise that prevent the ability to personally give input, to ensure appropriate representation of their personal desires for different levels and aggressiveness of care. Discussion:   Code status addressed and wants to be a Full Code. Patient wants central line and vasopressors if needed. Patient would also want a feeding tube, if needed, for nutritional support. Patient  would like to assign her sister as the surrogate decision maker. Persons present and participating in discussion: Zenia Smart MD,       Time Spent:   Total time spent face-to-face in education and discussion:   16  minutes.          Kurtis Suggs MD   Hospitalist

## 2021-05-31 NOTE — ED NOTES
Report called to Charlee Franco RN at [facility].  No further questions or concerns noted.

## 2021-05-31 NOTE — H&P
[facility] Adult  Hospitalist Group  History and Physical    Primary Care Provider: Melissa Love MD  Date of Service:  5/31/2021    Subjective: The patient is 78years old woman with past medical history significant for recent STEMI with BRANDON this month, diabetes mellitus, hyperlipidemia, hypertension presented to the emergency department due to syncope and melena. Patient reported that her symptoms started yesterday when she so black stool multiple times when she was having bowel movements then she called the pharmacist who advised her to go to the emergency department. She reported that she went to the emergency department and she was sent home but her symptoms were recurrent and she had more black stool when she went back home. She said after she went back from the emergency department, she passed out for 1 to 2 minutes that was witnessed by her . She said after that she was very concerned because she saw more black stool and she was talking on the phone with her sister when she passed out again. She denied any chest pain, abdominal pain, nausea, diarrhea, shortness of breath, dizziness, palpitations or any other symptoms before or after her syncope. Patient reported that she has been taking her medications including her dual antiplatelets. She also reported 1 episode of vomiting last night with no blood but she never had nausea or vomiting after that. Patient does not smoke, drink alcohol or use illicit drugs. In the emergency department patient was found to have globin around 11. Review of Systems:    Constitutional: negative for fevers, chills and fatigue  Ears, Nose, Mouth, Throat, and Face: negative for hearing loss, tinnitus and epistaxis  Respiratory: negative for cough, sputum, hemoptysis, wheezing or dyspnea on exertion  Cardiovascular: negative for chest pain, dyspnea, palpitations, fatigue.  Positive for syncope  Gastrointestinal: positive for melena  Musculoskeletal:negative for myalgias, arthralgias and stiff joints  Neurological: negative for headaches, dizziness and vertigo     Past Medical History:   Diagnosis Date    Hypertension     Menopause       No past surgical history on file. Prior to Admission medications    Medication Sig Start Date End Date Taking? Authorizing Provider   fluocinolone acetonide oiL 0.01 % drop  5/5/21   Other, MD Julissa   pioglitazone (ACTOS) 30 mg tablet  4/21/21   [place], MD Julissa   spironolactone-hydrochlorothiazide (ALDACTAZIDE) 25-25 mg per tablet  4/21/21   Other, MD Julissa   aspirin 81 mg chewable tablet Take 1 Tablet by mouth daily. 5/22/21   Axel Lentz MD   atorvastatin (LIPITOR) 80 mg tablet Take 1 Tablet by mouth daily. 5/22/21   Axel Lentz MD   losartan (COZAAR) 25 mg tablet Take 1 Tablet by mouth daily. 5/22/21   Axel Lentz MD   metoprolol succinate (TOPROL-XL) 25 mg XL tablet Take 1 Tablet by mouth daily. 5/22/21   Axel Lentz MD   prasugreL (EFFIENT) 10 mg tablet Take 1 Tablet by mouth daily. 5/22/21   Nia Wood MD     No Known Allergies   No family history on file. SOCIAL HISTORY:  Patient resides at Home. Smoking history: never  Alcohol history: none        Objective:       Physical Exam:   Visit Vitals  /61 (BP 1 Location: Right upper arm, BP Patient Position: At rest)   Pulse 75   Temp 98 °F (36.7 °C)   Resp 17   SpO2 100%     General:  Alert, cooperative, no distress, appears stated age. Head:  Normocephalic, without obvious abnormality, atraumatic. Eyes:  Conjunctivae/corneas clear. PERRL, EOMs intact. Fundi benign. Ears:  Normal TMs and external ear canals both ears. Nose: Nares normal. Septum midline. Mucosa normal. No drainage or sinus tenderness. Throat: Lips, mucosa, and tongue normal. Teeth and gums normal.   Neck: Supple, symmetrical, trachea midline, no adenopathy, thyroid: no enlargement/tenderness/nodules, no carotid bruit and no JVD. Back:   Symmetric, no curvature. ROM normal. No CVA tenderness. Lungs:   Clear to auscultation bilaterally. Chest wall:  No tenderness or deformity. Heart:  Regular rate and rhythm, S1, S2 normal, no murmur, click, rub or gallop. Breast Exam:  No tenderness, masses, or nipple abnormality. Abdomen:   Soft, non-tender. Bowel sounds normal. No masses,  No organomegaly. Genitalia:  Normal female without lesion, discharge or tenderness. Rectal:  Normal tone,  no masses or tenderness  Guaiac negative stool. Extremities: Extremities normal, atraumatic, no cyanosis or edema. Pulses: 2+ and symmetric all extremities. Skin: Skin color, texture, turgor normal. No rashes or lesions. Lymph nodes: Cervical, supraclavicular, and axillary nodes normal.   Neurologic: CNII-XII intact. Normal strength, sensation and reflexes throughout. Cap refill: Brisk, less than 3 seconds  Pulses: 2+, symmetric in all extremities  Skin: Warm, dry, without rashes or lesions    ECG:    Sinus rhythm   Probable anterior infarct, age indeterminate   Abnormal T, consider ischemia, inferior leads     Data Review: All diagnostic labs and studies have been reviewed. Assessment/Plan:     Syncope, POA  Acute melena, POA  Acute upper GI bleed, POA  Acute blood loss anemia, POA  QTC prolongation, POA  -Presented with syncope, melena, hemoglobin 11.7 from 13.2 on May 21  -On dual antiplatelets due to recent STEMI  -Not on any anticoagulations  -No abdominal pain, diarrhea or constipation  -Not on PPI  -No previous GI bleed  -We will order echo, CT head  -Obtain Hemoccult  -Start Protonix 40 mg IV twice daily  -Continue aspirin due to recent stent. Hold Effient due to high risk of bleeding.   Holding Effient was discussed with patient in details about risks, benefits and alternatives and she agreed on holding Effient at this point.  -Consult cardiology to optimize her dual antiplatelets  -Consult GI to eval for EGD/colonoscopy  -Continue telemetry monitoring  -QTC around 419 on admission, repeat EKG tomorrow a.m. and avoid QT prolonging agents if possible  -Initial troponin less than 0.05, repeat 1 more troponin    Coronary artery disease with recent STEMI  -Status post BRANDON May 2021  -Dual antiplatelets as above.   To continue aspirin on hold Effient  -Continue metoprolol and statin  -Continue spironolactone and lisinopril    Hypertension  Hyperlipidemia  -Continue home meds as tolerated        FUNCTIONAL STATUS PRIOR TO HOSPITALIZATION Ambulates Independently         Signed By: Lien Rooney MD     May 31, 2021

## 2021-06-01 ENCOUNTER — ANESTHESIA EVENT (OUTPATIENT)
Dept: ENDOSCOPY | Age: 66
DRG: 377 | End: 2021-06-01
Payer: MEDICARE

## 2021-06-01 ENCOUNTER — ANESTHESIA (OUTPATIENT)
Dept: ENDOSCOPY | Age: 66
DRG: 377 | End: 2021-06-01
Payer: MEDICARE

## 2021-06-01 ENCOUNTER — APPOINTMENT (OUTPATIENT)
Dept: NON INVASIVE DIAGNOSTICS | Age: 66
DRG: 377 | End: 2021-06-01
Attending: INTERNAL MEDICINE
Payer: MEDICARE

## 2021-06-01 PROBLEM — K92.2 UPPER GI BLEED: Status: ACTIVE | Noted: 2021-06-01

## 2021-06-01 PROBLEM — K92.2 GI BLEED: Status: ACTIVE | Noted: 2021-06-01

## 2021-06-01 LAB
ABO + RH BLD: NORMAL
ALBUMIN SERPL-MCNC: 2.9 G/DL (ref 3.5–5)
ALBUMIN/GLOB SERPL: 0.7 {RATIO} (ref 1.1–2.2)
ALP SERPL-CCNC: 138 U/L (ref 45–117)
ALT SERPL-CCNC: 20 U/L (ref 12–78)
ANION GAP SERPL CALC-SCNC: 7 MMOL/L (ref 5–15)
AST SERPL-CCNC: 14 U/L (ref 15–37)
BASOPHILS # BLD: 0 K/UL (ref 0–0.1)
BASOPHILS NFR BLD: 1 % (ref 0–1)
BILIRUB SERPL-MCNC: 0.5 MG/DL (ref 0.2–1)
BLOOD GROUP ANTIBODIES SERPL: POSITIVE
BUN SERPL-MCNC: 10 MG/DL (ref 6–20)
BUN/CREAT SERPL: 13 (ref 12–20)
CALCIUM SERPL-MCNC: 8.6 MG/DL (ref 8.5–10.1)
CHLORIDE SERPL-SCNC: 109 MMOL/L (ref 97–108)
CO2 SERPL-SCNC: 25 MMOL/L (ref 21–32)
CREAT SERPL-MCNC: 0.77 MG/DL (ref 0.55–1.02)
DIFFERENTIAL METHOD BLD: ABNORMAL
ECHO AO ROOT DIAM: 3.62 CM
ECHO LA MAJOR AXIS: 3.54 CM
ECHO LA MINOR AXIS: 1.97 CM
ECHO LV EDV A2C: 51.5 ML
ECHO LV EDV A4C: 71.64 ML
ECHO LV EDV BP: 61.64 ML (ref 56–104)
ECHO LV EDV INDEX A4C: 39.8 ML/M2
ECHO LV EDV INDEX BP: 34.2 ML/M2
ECHO LV EDV NDEX A2C: 28.6 ML/M2
ECHO LV EJECTION FRACTION A2C: 39 PERCENT
ECHO LV EJECTION FRACTION A4C: 57 PERCENT
ECHO LV EJECTION FRACTION BIPLANE: 49.6 PERCENT (ref 55–100)
ECHO LV ESV A2C: 31.47 ML
ECHO LV ESV A4C: 30.62 ML
ECHO LV ESV BP: 31.09 ML (ref 19–49)
ECHO LV ESV INDEX A2C: 17.5 ML/M2
ECHO LV ESV INDEX A4C: 17 ML/M2
ECHO LV ESV INDEX BP: 17.3 ML/M2
ECHO LV INTERNAL DIMENSION DIASTOLIC: 4.12 CM (ref 3.9–5.3)
ECHO LV INTERNAL DIMENSION SYSTOLIC: 2.8 CM
ECHO LV IVSD: 1.09 CM (ref 0.6–0.9)
ECHO LV MASS 2D: 149.5 G (ref 67–162)
ECHO LV MASS INDEX 2D: 83 G/M2 (ref 43–95)
ECHO LV POSTERIOR WALL DIASTOLIC: 1.08 CM (ref 0.6–0.9)
ECHO LVOT DIAM: 1.82 CM
ECHO LVOT PEAK GRADIENT: 1.98 MMHG
ECHO LVOT PEAK VELOCITY: 70.4 CM/S
ECHO MV A VELOCITY: 88.85 CM/S
ECHO MV AREA PHT: 2.16 CM2
ECHO MV E DECELERATION TIME (DT): 351.94 MS
ECHO MV E VELOCITY: 50.65 CM/S
ECHO MV E/A RATIO: 0.57
ECHO MV PRESSURE HALF TIME (PHT): 102.06 MS
ECHO PV PEAK INSTANTANEOUS GRADIENT SYSTOLIC: 0.9 MMHG
ECHO RV INTERNAL DIMENSION: 2.76 CM
ECHO RV TAPSE: 1.57 CM (ref 1.5–2)
ECHO TV REGURGITANT MAX VELOCITY: 236.06 CM/S
ECHO TV REGURGITANT PEAK GRADIENT: 22.29 MMHG
EOSINOPHIL # BLD: 0.1 K/UL (ref 0–0.4)
EOSINOPHIL NFR BLD: 2 % (ref 0–7)
ERYTHROCYTE [DISTWIDTH] IN BLOOD BY AUTOMATED COUNT: 13.1 % (ref 11.5–14.5)
GLOBULIN SER CALC-MCNC: 4 G/DL (ref 2–4)
GLUCOSE BLD STRIP.AUTO-MCNC: 113 MG/DL (ref 65–117)
GLUCOSE BLD STRIP.AUTO-MCNC: 167 MG/DL (ref 65–117)
GLUCOSE BLD STRIP.AUTO-MCNC: 82 MG/DL (ref 65–117)
GLUCOSE BLD STRIP.AUTO-MCNC: 90 MG/DL (ref 65–117)
GLUCOSE SERPL-MCNC: 108 MG/DL (ref 65–100)
HCT VFR BLD AUTO: 33.2 % (ref 35–47)
HGB BLD-MCNC: 10.4 G/DL (ref 11.5–16)
IMM GRANULOCYTES # BLD AUTO: 0 K/UL (ref 0–0.04)
IMM GRANULOCYTES NFR BLD AUTO: 0 % (ref 0–0.5)
LYMPHOCYTES # BLD: 1.9 K/UL (ref 0.8–3.5)
LYMPHOCYTES NFR BLD: 36 % (ref 12–49)
MCH RBC QN AUTO: 27.8 PG (ref 26–34)
MCHC RBC AUTO-ENTMCNC: 31.3 G/DL (ref 30–36.5)
MCV RBC AUTO: 88.8 FL (ref 80–99)
MONOCYTES # BLD: 0.4 K/UL (ref 0–1)
MONOCYTES NFR BLD: 7 % (ref 5–13)
NEUTS SEG # BLD: 2.8 K/UL (ref 1.8–8)
NEUTS SEG NFR BLD: 54 % (ref 32–75)
NRBC # BLD: 0 K/UL (ref 0–0.01)
NRBC BLD-RTO: 0 PER 100 WBC
PLATELET # BLD AUTO: 246 K/UL (ref 150–400)
PMV BLD AUTO: 10.7 FL (ref 8.9–12.9)
POTASSIUM SERPL-SCNC: 3.6 MMOL/L (ref 3.5–5.1)
PROT SERPL-MCNC: 6.9 G/DL (ref 6.4–8.2)
RBC # BLD AUTO: 3.74 M/UL (ref 3.8–5.2)
SERVICE CMNT-IMP: ABNORMAL
SERVICE CMNT-IMP: NORMAL
SODIUM SERPL-SCNC: 141 MMOL/L (ref 136–145)
SPECIMEN EXP DATE BLD: NORMAL
WBC # BLD AUTO: 5.1 K/UL (ref 3.6–11)
XXAUTO CONTROL: NORMAL

## 2021-06-01 PROCEDURE — 65270000029 HC RM PRIVATE

## 2021-06-01 PROCEDURE — 74011000250 HC RX REV CODE- 250: Performed by: NURSE ANESTHETIST, CERTIFIED REGISTERED

## 2021-06-01 PROCEDURE — 85025 COMPLETE CBC W/AUTO DIFF WBC: CPT

## 2021-06-01 PROCEDURE — 74011250636 HC RX REV CODE- 250/636: Performed by: INTERNAL MEDICINE

## 2021-06-01 PROCEDURE — 74011000250 HC RX REV CODE- 250: Performed by: INTERNAL MEDICINE

## 2021-06-01 PROCEDURE — 82962 GLUCOSE BLOOD TEST: CPT

## 2021-06-01 PROCEDURE — 74011250637 HC RX REV CODE- 250/637: Performed by: INTERNAL MEDICINE

## 2021-06-01 PROCEDURE — 74011250636 HC RX REV CODE- 250/636: Performed by: NURSE ANESTHETIST, CERTIFIED REGISTERED

## 2021-06-01 PROCEDURE — 99218 HC RM OBSERVATION: CPT

## 2021-06-01 PROCEDURE — 96376 TX/PRO/DX INJ SAME DRUG ADON: CPT

## 2021-06-01 PROCEDURE — 76060000031 HC ANESTHESIA FIRST 0.5 HR: Performed by: INTERNAL MEDICINE

## 2021-06-01 PROCEDURE — C9113 INJ PANTOPRAZOLE SODIUM, VIA: HCPCS | Performed by: INTERNAL MEDICINE

## 2021-06-01 PROCEDURE — 93306 TTE W/DOPPLER COMPLETE: CPT

## 2021-06-01 PROCEDURE — 76040000019: Performed by: INTERNAL MEDICINE

## 2021-06-01 PROCEDURE — 80053 COMPREHEN METABOLIC PANEL: CPT

## 2021-06-01 PROCEDURE — 74011250637 HC RX REV CODE- 250/637: Performed by: STUDENT IN AN ORGANIZED HEALTH CARE EDUCATION/TRAINING PROGRAM

## 2021-06-01 PROCEDURE — 0DJ08ZZ INSPECTION OF UPPER INTESTINAL TRACT, VIA NATURAL OR ARTIFICIAL OPENING ENDOSCOPIC: ICD-10-PCS | Performed by: INTERNAL MEDICINE

## 2021-06-01 PROCEDURE — 2709999900 HC NON-CHARGEABLE SUPPLY: Performed by: INTERNAL MEDICINE

## 2021-06-01 PROCEDURE — 74011636637 HC RX REV CODE- 636/637: Performed by: INTERNAL MEDICINE

## 2021-06-01 PROCEDURE — 36415 COLL VENOUS BLD VENIPUNCTURE: CPT

## 2021-06-01 RX ORDER — ATROPINE SULFATE 0.1 MG/ML
0.5 INJECTION INTRAVENOUS
Status: DISCONTINUED | OUTPATIENT
Start: 2021-06-01 | End: 2021-06-01 | Stop reason: HOSPADM

## 2021-06-01 RX ORDER — SODIUM CHLORIDE 0.9 % (FLUSH) 0.9 %
5-40 SYRINGE (ML) INJECTION EVERY 8 HOURS
Status: DISCONTINUED | OUTPATIENT
Start: 2021-06-01 | End: 2021-06-02 | Stop reason: HOSPADM

## 2021-06-01 RX ORDER — MIDAZOLAM HYDROCHLORIDE 1 MG/ML
.25-5 INJECTION, SOLUTION INTRAMUSCULAR; INTRAVENOUS
Status: DISCONTINUED | OUTPATIENT
Start: 2021-06-01 | End: 2021-06-01 | Stop reason: HOSPADM

## 2021-06-01 RX ORDER — DEXTROMETHORPHAN/PSEUDOEPHED 2.5-7.5/.8
1.2 DROPS ORAL
Status: DISCONTINUED | OUTPATIENT
Start: 2021-06-01 | End: 2021-06-01 | Stop reason: HOSPADM

## 2021-06-01 RX ORDER — SODIUM CHLORIDE 9 MG/ML
50 INJECTION, SOLUTION INTRAVENOUS CONTINUOUS
Status: DISPENSED | OUTPATIENT
Start: 2021-06-01 | End: 2021-06-01

## 2021-06-01 RX ORDER — FENTANYL CITRATE 50 UG/ML
25-200 INJECTION, SOLUTION INTRAMUSCULAR; INTRAVENOUS
Status: DISCONTINUED | OUTPATIENT
Start: 2021-06-01 | End: 2021-06-01 | Stop reason: HOSPADM

## 2021-06-01 RX ORDER — POTASSIUM CHLORIDE 750 MG/1
40 TABLET, FILM COATED, EXTENDED RELEASE ORAL ONCE
Status: COMPLETED | OUTPATIENT
Start: 2021-06-01 | End: 2021-06-01

## 2021-06-01 RX ORDER — EPINEPHRINE 0.1 MG/ML
1 INJECTION INTRACARDIAC; INTRAVENOUS
Status: DISCONTINUED | OUTPATIENT
Start: 2021-06-01 | End: 2021-06-01 | Stop reason: HOSPADM

## 2021-06-01 RX ORDER — FENTANYL CITRATE 50 UG/ML
200 INJECTION, SOLUTION INTRAMUSCULAR; INTRAVENOUS
Status: DISCONTINUED | OUTPATIENT
Start: 2021-06-01 | End: 2021-06-01 | Stop reason: HOSPADM

## 2021-06-01 RX ORDER — PHENYLEPHRINE HCL IN 0.9% NACL 0.4MG/10ML
SYRINGE (ML) INTRAVENOUS AS NEEDED
Status: DISCONTINUED | OUTPATIENT
Start: 2021-06-01 | End: 2021-06-01 | Stop reason: HOSPADM

## 2021-06-01 RX ORDER — SODIUM CHLORIDE 9 MG/ML
INJECTION, SOLUTION INTRAVENOUS
Status: DISCONTINUED | OUTPATIENT
Start: 2021-06-01 | End: 2021-06-01 | Stop reason: HOSPADM

## 2021-06-01 RX ORDER — FLUMAZENIL 0.1 MG/ML
0.2 INJECTION INTRAVENOUS
Status: DISCONTINUED | OUTPATIENT
Start: 2021-06-01 | End: 2021-06-01 | Stop reason: HOSPADM

## 2021-06-01 RX ORDER — NALOXONE HYDROCHLORIDE 0.4 MG/ML
0.4 INJECTION, SOLUTION INTRAMUSCULAR; INTRAVENOUS; SUBCUTANEOUS
Status: DISCONTINUED | OUTPATIENT
Start: 2021-06-01 | End: 2021-06-01 | Stop reason: HOSPADM

## 2021-06-01 RX ORDER — LIDOCAINE HYDROCHLORIDE 20 MG/ML
INJECTION, SOLUTION EPIDURAL; INFILTRATION; INTRACAUDAL; PERINEURAL AS NEEDED
Status: DISCONTINUED | OUTPATIENT
Start: 2021-06-01 | End: 2021-06-01 | Stop reason: HOSPADM

## 2021-06-01 RX ORDER — SODIUM CHLORIDE 9 MG/ML
150 INJECTION, SOLUTION INTRAVENOUS CONTINUOUS
Status: DISPENSED | OUTPATIENT
Start: 2021-06-01 | End: 2021-06-01

## 2021-06-01 RX ORDER — SODIUM CHLORIDE 0.9 % (FLUSH) 0.9 %
5-40 SYRINGE (ML) INJECTION AS NEEDED
Status: DISCONTINUED | OUTPATIENT
Start: 2021-06-01 | End: 2021-06-02 | Stop reason: HOSPADM

## 2021-06-01 RX ORDER — PROPOFOL 10 MG/ML
INJECTION, EMULSION INTRAVENOUS AS NEEDED
Status: DISCONTINUED | OUTPATIENT
Start: 2021-06-01 | End: 2021-06-01 | Stop reason: HOSPADM

## 2021-06-01 RX ADMIN — SODIUM CHLORIDE: 900 INJECTION, SOLUTION INTRAVENOUS at 12:14

## 2021-06-01 RX ADMIN — LOSARTAN POTASSIUM 25 MG: 25 TABLET, FILM COATED ORAL at 08:59

## 2021-06-01 RX ADMIN — POTASSIUM CHLORIDE 40 MEQ: 750 TABLET, EXTENDED RELEASE ORAL at 17:42

## 2021-06-01 RX ADMIN — Medication 80 MCG: at 12:46

## 2021-06-01 RX ADMIN — SODIUM CHLORIDE 40 MG: 9 INJECTION INTRAMUSCULAR; INTRAVENOUS; SUBCUTANEOUS at 08:58

## 2021-06-01 RX ADMIN — INSULIN LISPRO 2 UNITS: 100 INJECTION, SOLUTION INTRAVENOUS; SUBCUTANEOUS at 17:42

## 2021-06-01 RX ADMIN — SODIUM CHLORIDE 40 MG: 9 INJECTION INTRAMUSCULAR; INTRAVENOUS; SUBCUTANEOUS at 21:59

## 2021-06-01 RX ADMIN — PROPOFOL 30 MG: 10 INJECTION, EMULSION INTRAVENOUS at 12:35

## 2021-06-01 RX ADMIN — Medication 10 ML: at 14:56

## 2021-06-01 RX ADMIN — HYDROCHLOROTHIAZIDE: 25 TABLET ORAL at 08:59

## 2021-06-01 RX ADMIN — Medication 10 ML: at 06:00

## 2021-06-01 RX ADMIN — PRASUGREL 10 MG: 10 TABLET, FILM COATED ORAL at 08:59

## 2021-06-01 RX ADMIN — ASPIRIN 81 MG: 81 TABLET, CHEWABLE ORAL at 08:59

## 2021-06-01 RX ADMIN — PROPOFOL 20 MG: 10 INJECTION, EMULSION INTRAVENOUS at 12:38

## 2021-06-01 RX ADMIN — LIDOCAINE HYDROCHLORIDE 40 MG: 20 INJECTION, SOLUTION EPIDURAL; INFILTRATION; INTRACAUDAL; PERINEURAL at 12:31

## 2021-06-01 RX ADMIN — PROPOFOL 80 MG: 10 INJECTION, EMULSION INTRAVENOUS at 12:31

## 2021-06-01 RX ADMIN — METOPROLOL SUCCINATE 25 MG: 25 TABLET, EXTENDED RELEASE ORAL at 08:59

## 2021-06-01 RX ADMIN — PROPOFOL 20 MG: 10 INJECTION, EMULSION INTRAVENOUS at 12:33

## 2021-06-01 RX ADMIN — ATORVASTATIN CALCIUM 80 MG: 40 TABLET, FILM COATED ORAL at 08:59

## 2021-06-01 RX ADMIN — Medication 80 MCG: at 12:43

## 2021-06-01 RX ADMIN — Medication 10 ML: at 22:01

## 2021-06-01 NOTE — PROGRESS NOTES

## 2021-06-01 NOTE — PROGRESS NOTES
[facility] Cardiology Progress Note    Date of service: 6/1/2021    Subjective:   Ms Elaine Chandler says she's doing ok  No chest pain  States that she has not had further melena overnight    Objective:    Visit Vitals  BP (!) 143/69   Pulse 72   Temp 98.2 °F (36.8 °C)   Resp 18   SpO2 98%       Physical Exam  Constitutional:       Appearance: She is well-developed. HENT:      Head: Normocephalic and atraumatic. Eyes:      General: No scleral icterus. Conjunctiva/sclera: Conjunctivae normal.   Neck:      Vascular: No JVD. Cardiovascular:      Rate and Rhythm: Normal rate and regular rhythm. Heart sounds: Normal heart sounds. No murmur heard. No gallop. Pulmonary:      Effort: Pulmonary effort is normal. No respiratory distress. Breath sounds: Normal breath sounds. No stridor. No wheezing or rales. Abdominal:      General: There is no distension. Palpations: Abdomen is soft. Musculoskeletal:         General: No deformity. Skin:     General: Skin is warm and dry. Neurological:      Mental Status: She is alert and oriented to person, place, and time. Psychiatric:         Behavior: Behavior normal.         Data reviewed:  Recent Results (from the past 12 hour(s))   METABOLIC PANEL, COMPREHENSIVE    Collection Time: 06/01/21 12:15 AM   Result Value Ref Range    Sodium 141 136 - 145 mmol/L    Potassium 3.6 3.5 - 5.1 mmol/L    Chloride 109 (H) 97 - 108 mmol/L    CO2 25 21 - 32 mmol/L    Anion gap 7 5 - 15 mmol/L    Glucose 108 (H) 65 - 100 mg/dL    BUN 10 6 - 20 MG/DL    Creatinine 0.77 0.55 - 1.02 MG/DL    BUN/Creatinine ratio 13 12 - 20      GFR est AA >60 >60 ml/min/1.73m2    GFR est non-AA >60 >60 ml/min/1.73m2    Calcium 8.6 8.5 - 10.1 MG/DL    Bilirubin, total 0.5 0.2 - 1.0 MG/DL    ALT (SGPT) 20 12 - 78 U/L    AST (SGOT) 14 (L) 15 - 37 U/L    Alk.  phosphatase 138 (H) 45 - 117 U/L    Protein, total 6.9 6.4 - 8.2 g/dL    Albumin 2.9 (L) 3.5 - 5.0 g/dL    Globulin 4.0 2.0 - 4.0 g/dL    A-G Ratio 0.7 (L) 1.1 - 2.2     CBC WITH AUTOMATED DIFF    Collection Time: 06/01/21 12:15 AM   Result Value Ref Range    WBC 5.1 3.6 - 11.0 K/uL    RBC 3.74 (L) 3.80 - 5.20 M/uL    HGB 10.4 (L) 11.5 - 16.0 g/dL    HCT 33.2 (L) 35.0 - 47.0 %    MCV 88.8 80.0 - 99.0 FL    MCH 27.8 26.0 - 34.0 PG    MCHC 31.3 30.0 - 36.5 g/dL    RDW 13.1 11.5 - 14.5 %    PLATELET 967 040 - 983 K/uL    MPV 10.7 8.9 - 12.9 FL    NRBC 0.0 0  WBC    ABSOLUTE NRBC 0.00 0.00 - 0.01 K/uL    NEUTROPHILS 54 32 - 75 %    LYMPHOCYTES 36 12 - 49 %    MONOCYTES 7 5 - 13 %    EOSINOPHILS 2 0 - 7 %    BASOPHILS 1 0 - 1 %    IMMATURE GRANULOCYTES 0 0.0 - 0.5 %    ABS. NEUTROPHILS 2.8 1.8 - 8.0 K/UL    ABS. LYMPHOCYTES 1.9 0.8 - 3.5 K/UL    ABS. MONOCYTES 0.4 0.0 - 1.0 K/UL    ABS. EOSINOPHILS 0.1 0.0 - 0.4 K/UL    ABS. BASOPHILS 0.0 0.0 - 0.1 K/UL    ABS. IMM. GRANS. 0.0 0.00 - 0.04 K/UL    DF AUTOMATED     GLUCOSE, POC    Collection Time: 06/01/21  7:41 AM   Result Value Ref Range    Glucose (POC) 90 65 - 117 mg/dL    Performed by Francisco Klinefelter        Assessment:    1. Suspect upper GI bleed  2. Syncope  3. Recent acute MI on 5/19  4. CAD with recent MI as above, now s/p PCI of the RCA  No acute ischemia  5. 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 HTN    Plan:    Continue ASA and Effient  Continue high dose PPI  Appreciate GI consult: agree with EGD  Monitor H&H closely    High risk situation with no good options other than determining and treating the source of bleeding. Early interruption of DAPT < 2 weeks after acute MI carries a very high risk of stent thrombosis and high risk of death. Signed:  Daniela Elizabeth MD  Interventional Cardiology  6/1/2021

## 2021-06-01 NOTE — PROCEDURES
[address], [address] [facility] (EGD) Procedure Note    Modesto Nascimento  1955  [id number]      Procedure: Endoscopic Gastroduodenoscopy --diagnostic    Indication: melena    Pre-operative Diagnosis: see indication above    Post-operative Diagnosis: see findings below    : Mekhi Arora. Jesu Powell MD    Referring Provider: Priti Thibodeaux MD,  Lily Dasilva MD      Anesthesia/Sedation:  MAC anesthesia Propofol        Procedure Details     After informed consent was obtained for the procedure, with all risks and benefits of procedure explained the patient was taken to the endoscopy suite and placed in the left lateral decubitus position. Following sequential administration of sedation as per above, the endoscope was inserted into the mouth and advanced under direct vision to second portion of the duodenum. A careful inspection was made as the gastroscope was withdrawn, including a retroflexed view of the proximal stomach; findings and interventions are described below. Findings:   Esophagus:normal  Stomach: small hiatal hernia. In the mid to distal body a large 3.5 cm pedunculated polyp with broad base extending into pylorus was seen. There was mucosal ulceration on the polyp. In setting of Effient dosing, biopsies/removal not performed today. Duodenum: normal to second portion      Therapies:  none    Specimens: none         EBL: None      Complications:   None; patient tolerated the procedure well. Impression:    1. Large pedunculated gastric polyp with mucosal ulceration - likely source of bleeding    Recommendations:  1. Repeat EGD with plan for EUS and EMR once she can have Effient safely held. 2. Advance diet as tolerated  3. Monitor CBC  4. Inpatient GI service to follow    Signed By: Mekhi Arora.  Jesu Powell MD     6/1/2021  12:44 PM

## 2021-06-01 NOTE — PROGRESS NOTES
TRANSFER - IN REPORT:    Verbal report received from Jose L Faria RN(name) on Wandalee Belt  being received from 6S(unit) for routine progression of care      Report consisted of patients Situation, Background, Assessment and   Recommendations(SBAR). Information from the following report(s) SBAR, MAR, Recent Results and Med Rec Status was reviewed with the receiving nurse. Opportunity for questions and clarification was provided. Assessment completed upon patients arrival to unit and care assumed.

## 2021-06-01 NOTE — PROGRESS NOTES
Hospitalist Progress Note    NAME: Jc Hernandez   :  1955   MRN:  [id number]       Admission HPI/Chief Complaint:  The patient is 78years old woman with past medical history significant for recent STEMI with BRANDON this month, diabetes mellitus, hyperlipidemia, hypertension presented to the emergency department due to syncope and melena. Patient reported that her symptoms started yesterday when she so black stool multiple times when she was having bowel movements then she called the pharmacist who advised her to go to the emergency department. She reported that she went to the emergency department and she was sent home but her symptoms were recurrent and she had more black stool when she went back home. She said after she went back from the emergency department, she passed out for 1 to 2 minutes that was witnessed by her . She said after that she was very concerned because she saw more black stool and she was talking on the phone with her sister when she passed out again. She denied any chest pain, abdominal pain, nausea, diarrhea, shortness of breath, dizziness, palpitations or any other symptoms before or after her syncope. Patient reported that she has been taking her medications including her dual antiplatelets. She also reported 1 episode of vomiting last night with no blood but she never had nausea or vomiting after that.  Rebkeah Kenya does not smoke, drink alcohol or use illicit drugs.   In the emergency department patient was found to have hemoglobin around 11. Subjective:   No overnight events. C/o discomfort in chest. NAD. EGD today. Reports no BM since admission. Review of Systems:  No fever/chills, poor appetite, cough, sputum, SOB, N/V, D/C. Objective:   VITALS:   Last 24hrs VS reviewed since prior progress note.  Most recent are:  Patient Vitals for the past 24 hrs:   Temp Pulse Resp BP SpO2   21 1310 -- 75 21 126/66 98 %   21 1305 -- 87 23 (!) 129/57 97 %   21 1300 -- 82 22 115/60 96 %   06/01/21 1255 -- 85 20 (!) 103/55 97 %   06/01/21 1251 97 °F (36.1 °C) 89 19 (!) 113/51 99 %   06/01/21 1247 -- 76 18 (!) 93/49 99 %   06/01/21 1241 -- 80 19 -- 99 %   06/01/21 1240 -- 79 19 -- 99 %   06/01/21 1148 -- 84 20 (!) 160/82 98 %   06/01/21 0958 98.1 °F (36.7 °C) 78 18 (!) 157/76 99 %   06/01/21 0937 -- -- -- (!) 144/56 --   06/01/21 0859 -- 66 -- (!) 144/56 --   06/01/21 0600 98.2 °F (36.8 °C) 72 18 (!) 143/69 98 %   06/01/21 0200 98.3 °F (36.8 °C) 70 18 (!) 151/64 98 %   05/31/21 2125 98.5 °F (36.9 °C) 75 18 (!) 154/70 98 %   05/31/21 1800 98.3 °F (36.8 °C) 71 20 (!) 132/59 97 %       Intake/Output Summary (Last 24 hours) at 6/1/2021 1436  Last data filed at 6/1/2021 1305  Gross per 24 hour   Intake 1130 ml   Output 1000 ml   Net 130 ml        I had a face to face encounter and independently examined this patient on 6/1/2021, as outlined below:  PHYSICAL EXAM:  General: Alert, cooperative, no acute distress    EENT:  EOMI. Anicteric sclerae. MMM  Resp:  CTA bilaterally, no wheezing or rales. No accessory muscle use  CV:  RRR, No edema  GI:  Soft, Non distended, Non tender  Neurologic:  Alert and oriented, normal speech, RUSSELL  Psych:   Not anxious or agitated  Skin:  No rashes. No jaundice    Reviewed most current lab test results and cultures  YES  Reviewed most current radiology test results   YES  Reviewed patient's current orders and MAR    YES  PMH/SH reviewed - no change compared to H&P    Procedures: see electronic medical records for all procedures/Xrays and details which were not copied into this note but were reviewed prior to creation of Plan. LABS:  I reviewed today's most current labs and imaging studies.   Pertinent labs include:  Recent Labs     06/01/21  0015 05/31/21  1308 05/31/21  0004 05/30/21  1728   WBC 5.1  --  6.2 5.2   HGB 10.4* 9.3* 11.7* 12.0*   HCT 33.2* 29.5* 35.2* 35.9*     --  271 279     Recent Labs     06/01/21  0015 05/31/21  0004 05/30/21  1728    142 141   K 3.6 4.2 4.1   * 106 104   CO2 25 29 30   * 166* 135*   BUN 10 20 21*   CREA 0.77 0.94 0.93   CA 8.6 8.9 9.0   ALB 2.9* 3.2* 3.3*   TBILI 0.5 0.6 0.5   ALT 20 22 22       Care Plan discussed with: pt  ___________________________________________________________________  Assessment / Plan:  Acute melena likely upper GI bleed, POA  Acute blood loss anemia, POA  Syncope 2/2 blood loss, POA  QTC prolongation, POA  On DAPT s/p recent PCI  Not on PPI, No previous GI bleed  Cont PPI   on admission  Appreciate GI: s/p EGD 6/1 with Large pedunculated gastric polyp with mucosal ulceration - likely source of bleeding; Repeat EGD with plan for EUS and EMR once she can have Effient safely held  ADAT     Coronary artery disease with recent STEMI s/p BRANDON 5/2021  Continue metoprolol, statin, spironolactone, HCTZ, lisinopril  Appreciate cardiology: Since she had a very recent STEMI and PCI, anti-platelets will need to be continued unless there is life-threatening bleeding  Need to continue ASA and Effient  continue high dose PPI  Monitor hgb closely  Early interruption of DAPT < 2 weeks after acute MI carries a very high risk of stent thrombosis and high risk of death     Hypertension controlled  Cont home meds as above    Hyperlipidemia  Continue home statin    Hypokalemia, replete  Check mag    18.5 - 24.9 Normal weight / Body mass index is 23.81 kg/m².     Code status: Full Code  Prophylaxis: SCD's  Recommended Disposition: Home w/Family    Signed: Rene Moseley MD  6/1/2021 2:36 PM

## 2021-06-01 NOTE — PROGRESS NOTES
CHELSEY:  RUR: 16%    Disposition:  Home when medically stable    Reason for Admission:  Melena and multiple syncopal episodes                     RUR Score:  15%                PCP: First and Last name:   Abdi Ling MD     Name of Practice:    Are you a current patient: Yes/No: yes   Approximate date of last visit: May 2021   Can you participate in a virtual visit if needed:     Do you (patient/family) have any concerns for transition/discharge? no              Plan for utilizing home health:   Not indicated at this time    Current Advanced Directive/Advance Care Plan:  Full Code      Healthcare Decision Maker:   Click here to complete [address] including selection of the Healthcare Decision Maker Relationship (ie \"Primary\")              Transition of Care Plan:   Home when stable    Chart reviewed. Patient transferred here from [facility] following 2 ED visits with complaints of syncopal episodes and tarry stools. The patient had a previous admission at [facility] 5/18/21- 5/21/21 with STEMI involving oth coronary artery of inferior wall ([address] The patient was d/c'd home. CM met with patient to introduce self and role. Demographics verified. Patient requests her sister Karley Lai [phone]) be added as emergency contact. The patient lives with her  in a  1-story residence. She has 2 adult sons. One of her sons will assist with d/c transportation. The patient has no dme, has not used HH, nor been admitted to SNF or IPR. The patient uses [facility] in [facility]. CM will continue to follow.     Kapil Cartwright, [address], [address] [place] [address]

## 2021-06-01 NOTE — ANESTHESIA POSTPROCEDURE EVALUATION
Post-Anesthesia Evaluation and Assessment    Patient: Christiano Granger MRN: [id number]  SSN: [id number]    YOB: 1955  Age: 78 y.o. Sex: female      I have evaluated the patient and they are stable and ready for discharge from the PACU. Cardiovascular Function/Vital Signs  Visit Vitals  BP (!) 93/49   Pulse 76   Temp 36.7 °C (98.1 °F)   Resp 18   Ht 5' 7\" (1.702 m)   Wt 68.9 kg (152 lb)   SpO2 99%   BMI 23.81 kg/m²       Patient is status post MAC anesthesia for Procedure(s):  ESOPHAGOGASTRODUODENOSCOPY (EGD). Nausea/Vomiting: None    Postoperative hydration reviewed and adequate. Pain:  Pain Scale 1: Numeric (0 - 10) (06/01/21 1148)  Pain Intensity 1: 0 (06/01/21 1148)   Managed    Neurological Status: At baseline    Mental Status, Level of Consciousness: Alert and  oriented to person, place, and time    Pulmonary Status:   O2 Device: Nasal cannula (06/01/21 1247)   Adequate oxygenation and airway patent    Complications related to anesthesia: None    Post-anesthesia assessment completed. No concerns    Signed By: Cherryl Nyhan, MD     June 1, 2021              Procedure(s):  ESOPHAGOGASTRODUODENOSCOPY (EGD). MAC    <[email or web address]>    INITIAL Post-op Vital signs:   Vitals Value Taken Time   BP     Temp     Pulse 83 06/01/21 1253   Resp     SpO2 99 % 06/01/21 1253   Vitals shown include unvalidated device data.

## 2021-06-01 NOTE — PROGRESS NOTES
TRANSFER - OUT REPORT:    Verbal report given to Riya RN(name) on Machelle Sizer  being transferred to (unit) for routine progression of care       Report consisted of patients Situation, Background, Assessment and   Recommendations(SBAR). Information from the following report(s) SBAR, Kardex, MAR, Recent Results, Med Rec Status, Cardiac Rhythm NSR, Alarm Parameters  and Quality Measures was reviewed with the receiving nurse. Lines:   Peripheral IV 05/31/21 Anterior; Left Hand (Active)   Site Assessment Clean, dry, & intact 06/01/21 1200   Phlebitis Assessment 0 06/01/21 1200   Infiltration Assessment 0 06/01/21 1200   Dressing Status Clean, dry, & intact 06/01/21 1200   Dressing Type Tape;Transparent 06/01/21 1200   Hub Color/Line Status Blue;Capped 06/01/21 1200   Action Taken Open ports on tubing capped 06/01/21 1200   Alcohol Cap Used Yes 06/01/21 1200        Opportunity for questions and clarification was provided.       Patient transported with:   [employer]

## 2021-06-01 NOTE — ANESTHESIA PREPROCEDURE EVALUATION
Relevant Problems   CARDIOVASCULAR   (+) STEMI involving oth coronary artery of inferior wall [facility])       Anesthetic History   No history of anesthetic complications            Review of Systems / Medical History  Patient summary reviewed, nursing notes reviewed and pertinent labs reviewed    Pulmonary  Within defined limits                 Neuro/Psych   Within defined limits           Cardiovascular    Hypertension          Past MI, CAD and cardiac stents    Exercise tolerance: >4 METS     GI/Hepatic/Renal                Endo/Other  Within defined limits           Other Findings              Physical Exam    Airway  Mallampati: I  TM Distance: > 6 cm  Neck ROM: normal range of motion   Mouth opening: Normal     Cardiovascular    Rhythm: regular  Rate: normal         Dental    Dentition: Edentulous     Pulmonary  Breath sounds clear to auscultation               Abdominal         Other Findings            Anesthetic Plan    ASA: 3  Anesthesia type: MAC          Induction: Intravenous  Anesthetic plan and risks discussed with: Patient

## 2021-06-01 NOTE — CONSULTS
[address]  [address]  [address]  [phone]        GASTROENTEROLOGY  CONSULTATION NOTE      NAME:  Francis Awad   :   1955   MRN:   [id number]     Referring Physician: Floridalma Posada MD     Date of Admission: 2021  Consult Date: 2021     Chief Complaint: Fainting, black stools    Reason for Consult: GI bleed    Assessment:   1. Melena: Likely upper GI bleed, possible PUD, gastritis, esophagitis, AVMs or malignancy. Reports possible EGD/colonoscopy 5-10 yrs back. Not sure of results. For now has resolved but will proceed with EGD in am.   2. Acute post hemorrhagic anemia: baseline normal hgb - down to 9.   3. Syncopal episode: Related to above. Hemodynamics stable since presentation. 4. Recent STEMI, s/p BRANDON: About 2 weeks back. Seen by cardiology and resumed aspirin and effient considering very high risk for instent thrombosis this soon after stent. 5. QT prolongation: Last QTc 493         Recommendations:   1. Okay to keep on clear liquids for tonight  2. NPO after midnight  3. Plan for EGD in am (agree with continuing antiplatelet therapy considering recent stent)       Thank you for allowing us to participate in the care of this patient. Please do not hesitate to contact us with any further questions/concerns. Ronna Keane MD  [facility]      ------------------------------------------------------------------------------------------------------------------    History of Present Illness:  Patient is a 78 y.o. with past medical history significant for DM, HTN, recent STEMI, s/p BRANDON  () who presented with black stools and faininting and we were consulted for above reason. History obtained from chart review and patient. Patient recently presented to [facility] with chest pain when found to have STEMI. She underwent BRANDON placement and discharged on aspirin and effient.  Report doing fine till yesterday when started noticing black stools. She apparently went to the ER but was not admitted and sent back home. Her symptoms persisted. This was followed by at least 2 fainting episodes. Denies any bright red blood per rectum. No chest pain, abdominal pain, nausea, vomiting. Denies using any NSAIDs. Denies any prior GI bleeding. She recalls having an upper endoscopy and a colonoscopy close to 5-10 yrs back but does not recall results. On admission her hgb was down to 12 from normal 13 and has since drifted down to 9. Her aspirin and effient were initially held but cardiology recommending resuming considering fresh stent and very high risk of stent thrombosis. Patient reports having another dark bowel movement this morning but last bowel movement did not show any more dark stool and was more yellowish. She denies any further dizziness or fainting episodes. Review of Systems:  A detailed 10 system ROS is obtained, with pertinent positives as listed in the HPI and PMH. All others are negative. Objective:   BP (!) (P) 154/70   Pulse (P) 75   Temp (P) 98.5 °F (36.9 °C)   Resp (P) 18   SpO2 (P) 98%     Physical Exam    General: Alert, cooperative, no acute distress    HEENT: Atraumatic. PERRLA, EOMI. Anicteric sclerae. Lungs:  CTA Bilaterally. No Wheezing/Rhonchi/Rales. Heart:  Regular  rhythm,  No murmur, Rubs, Gallops  Abdomen: Soft, Non distended, Non tender. Positive Bowel sounds, no hepatosplenomegaly  Extremities: No cyanosis or edema  Neurologic:  CN 2-12 grossly intact, Alert and oriented X 3. No acute neurological distress   Psych:    Good insight. Not anxious nor agitated.     Data Review      5/21/2021 07:40 5/30/2021 17:28 5/31/2021 00:04 5/31/2021 13:08   HGB 13.2 12.0 (L) 11.7 (L) 9.3 (L)      5/31/2021 00:04   WBC 6.2   HGB 11.7 (L)   HCT 35.2 (L)   MCV 85.8   PLATELET 464      7/43/2326 00:04   Sodium 142   Potassium 4.2   Chloride 106   CO2 29   Anion gap 7   Glucose 166 (H)   BUN 20 Creatinine 0.94   BUN/Creatinine ratio 21 (H)   Calcium 8.9   GFR est non-AA 59 (L)   GFR est AA >60   Bilirubin, total 0.6   Protein, total 7.3   Albumin 3.2 (L)   Globulin 4.1 (H)   A-G Ratio 0.8 (L)   ALT 22   AST 29   Alk. phosphatase 140 (H)   Troponin-I, Qt. <0.05      5/31/2021 18:41   COVID-19 RAPID TEST Not detected     Imaging studies: Reviewed    CT Head without contrast: No acute intracranial process identified by noncontrast CT    Prior Endoscopy: Reviewed, none on file. Per patient EGD/colonoscopy 5-10 yrs back. Unsure of results    Past Medical History:   Diagnosis Date    Hypertension     Menopause        No past surgical history on file. Allergies:  No Known Allergies    Medications Prior to Admission   Medication Sig    fluocinolone acetonide oiL 0.01 % drop     pioglitazone (ACTOS) 30 mg tablet     spironolactone-hydrochlorothiazide (ALDACTAZIDE) 25-25 mg per tablet     aspirin 81 mg chewable tablet Take 1 Tablet by mouth daily.  atorvastatin (LIPITOR) 80 mg tablet Take 1 Tablet by mouth daily.  losartan (COZAAR) 25 mg tablet Take 1 Tablet by mouth daily.  metoprolol succinate (TOPROL-XL) 25 mg XL tablet Take 1 Tablet by mouth daily.  prasugreL (EFFIENT) 10 mg tablet Take 1 Tablet by mouth daily.        Current Facility-Administered Medications   Medication Dose Route Frequency    aspirin chewable tablet 81 mg  81 mg Oral DAILY    atorvastatin (LIPITOR) tablet 80 mg  80 mg Oral DAILY    losartan (COZAAR) tablet 25 mg  25 mg Oral DAILY    metoprolol succinate (TOPROL-XL) XL tablet 25 mg  25 mg Oral DAILY    sodium chloride (NS) flush 5-40 mL  5-40 mL IntraVENous Q8H    sodium chloride (NS) flush 5-40 mL  5-40 mL IntraVENous PRN    acetaminophen (TYLENOL) tablet 650 mg  650 mg Oral Q6H PRN    Or    acetaminophen (TYLENOL) suppository 650 mg  650 mg Rectal Q6H PRN    polyethylene glycol (MIRALAX) packet 17 g  17 g Oral DAILY PRN    promethazine (PHENERGAN) tablet 12.5 mg  12.5 mg Oral Q6H PRN    Or    ondansetron (ZOFRAN) injection 4 mg  4 mg IntraVENous Q6H PRN    pantoprazole (PROTONIX) 40 mg in 0.9% sodium chloride 10 mL injection  40 mg IntraVENous Q12H    insulin lispro (HUMALOG) injection   SubCUTAneous AC&HS    glucose chewable tablet 16 g  4 Tablet Oral PRN    dextrose (D50W) injection syrg 12.5-25 g  12.5-25 g IntraVENous PRN    glucagon (GLUCAGEN) injection 1 mg  1 mg IntraMUSCular PRN    spironolactone/hydroCHLOROthiazide (ALDACTAZIDE) 25/25 mg   Oral DAILY    prasugreL (EFFIENT) tablet 10 mg  10 mg Oral DAILY       Social History     Tobacco Use    Smoking status: Never Smoker    Smokeless tobacco: Never Used   Substance Use Topics    Alcohol use: Not Currently       Family History:  No family history on file.

## 2021-06-01 NOTE — PROGRESS NOTES
Problem: Diabetes Self-Management  Goal: *Disease process and treatment process  Description: Define diabetes and identify own type of diabetes; list 3 options for treating diabetes. Outcome: Progressing Towards Goal  Goal: *Incorporating nutritional management into lifestyle  Description: Describe effect of type, amount and timing of food on blood glucose; list 3 methods for planning meals. Outcome: Progressing Towards Goal  Goal: *Incorporating physical activity into lifestyle  Description: State effect of exercise on blood glucose levels. Outcome: Progressing Towards Goal  Goal: *Developing strategies to promote health/change behavior  Description: Define the ABC's of diabetes; identify appropriate screenings, schedule and personal plan for screenings. Outcome: Progressing Towards Goal  Goal: *Using medications safely  Description: State effect of diabetes medications on diabetes; name diabetes medication taking, action and side effects. Outcome: Progressing Towards Goal  Goal: *Monitoring blood glucose, interpreting and using results  Description: Identify recommended blood glucose targets  and personal targets. Outcome: Progressing Towards Goal  Goal: *Prevention, detection, treatment of acute complications  Description: List symptoms of hyper- and hypoglycemia; describe how to treat low blood sugar and actions for lowering  high blood glucose level. Outcome: Progressing Towards Goal  Goal: *Prevention, detection and treatment of chronic complications  Description: Define the natural course of diabetes and describe the relationship of blood glucose levels to long term complications of diabetes.   Outcome: Progressing Towards Goal  Goal: *Developing strategies to address psychosocial issues  Description: Describe feelings about living with diabetes; identify support needed and support network  Outcome: Progressing Towards Goal     Problem: Patient Education: Go to Patient Education Activity  Goal: Patient/Family Education  Outcome: Progressing Towards Goal     Problem: Falls - Risk of  Goal: *Absence of Falls  Description: Document Contreras Son Fall Risk and appropriate interventions in the flowsheet.   Outcome: Progressing Towards Goal  Note: Fall Risk Interventions:  Mobility Interventions: Communicate number of staff needed for ambulation/transfer, OT consult for ADLs, Patient to call before getting OOB, PT Consult for mobility concerns         Medication Interventions: Evaluate medications/consider consulting pharmacy, Patient to call before getting OOB, Teach patient to arise slowly                   Problem: Patient Education: Go to Patient Education Activity  Goal: Patient/Family Education  Outcome: Progressing Towards Goal

## 2021-06-01 NOTE — PROGRESS NOTES
[address]  Romulo Johns [address], [address]       GI PROGRESS NOTE  [facility]  [phone] NP in-hospital cell phone M-F until 4:30  After 5pm or on weekends, please call  for physician on call      NAME: Stephany Bangura   :  1955   MRN:  [id number]       Subjective:   She is feeling well, no further melena overnight. No abdominal pain or nausea. Objective:     VITALS:   Last 24hrs VS reviewed since prior progress note. Most recent are:  Visit Vitals  BP (!) 144/56   Pulse 66   Temp 98.2 °F (36.8 °C)   Resp 18   SpO2 98%       PHYSICAL EXAM:  General: Cooperative, no acute distress    Neurologic:  Alert and oriented X 3. HEENT: EOMI, no scleral icterus   Lungs:  No increased WOB  Heart:  regular rate  Abdomen: Soft, non-distended, no tenderness. Extremities: warm  Psych:   Good insight. Not anxious or agitated.     Lab Data Reviewed:     Recent Results (from the past 24 hour(s))   OCCULT BLOOD, STOOL    Collection Time: 21 10:27 AM   Result Value Ref Range    Occult blood, stool Positive (A) NEG     TROPONIN I    Collection Time: 21 10:38 AM   Result Value Ref Range    Troponin-I, Qt. <0.05 <0.05 ng/mL   GLUCOSE, POC    Collection Time: 21 11:27 AM   Result Value Ref Range    Glucose (POC) 165 (H) 65 - 117 mg/dL    Performed by Yue Flynn    HGB & HCT    Collection Time: 21  1:08 PM   Result Value Ref Range    HGB 9.3 (L) 11.5 - 16.0 g/dL    HCT 29.5 (L) 35.0 - 47.0 %   GLUCOSE, POC    Collection Time: 21  4:37 PM   Result Value Ref Range    Glucose (POC) 143 (H) 65 - 117 mg/dL    Performed by Arta Holstein (CON)    COVID-19 RAPID TEST    Collection Time: 21  6:41 PM   Result Value Ref Range    Specimen source Nasopharyngeal      COVID-19 rapid test Not detected NOTD     METABOLIC PANEL, COMPREHENSIVE    Collection Time: 21 12:15 AM   Result Value Ref Range    Sodium 141 136 - 145 mmol/L Potassium 3.6 3.5 - 5.1 mmol/L    Chloride 109 (H) 97 - 108 mmol/L    CO2 25 21 - 32 mmol/L    Anion gap 7 5 - 15 mmol/L    Glucose 108 (H) 65 - 100 mg/dL    BUN 10 6 - 20 MG/DL    Creatinine 0.77 0.55 - 1.02 MG/DL    BUN/Creatinine ratio 13 12 - 20      GFR est AA >60 >60 ml/min/1.73m2    GFR est non-AA >60 >60 ml/min/1.73m2    Calcium 8.6 8.5 - 10.1 MG/DL    Bilirubin, total 0.5 0.2 - 1.0 MG/DL    ALT (SGPT) 20 12 - 78 U/L    AST (SGOT) 14 (L) 15 - 37 U/L    Alk. phosphatase 138 (H) 45 - 117 U/L    Protein, total 6.9 6.4 - 8.2 g/dL    Albumin 2.9 (L) 3.5 - 5.0 g/dL    Globulin 4.0 2.0 - 4.0 g/dL    A-G Ratio 0.7 (L) 1.1 - 2.2     CBC WITH AUTOMATED DIFF    Collection Time: 06/01/21 12:15 AM   Result Value Ref Range    WBC 5.1 3.6 - 11.0 K/uL    RBC 3.74 (L) 3.80 - 5.20 M/uL    HGB 10.4 (L) 11.5 - 16.0 g/dL    HCT 33.2 (L) 35.0 - 47.0 %    MCV 88.8 80.0 - 99.0 FL    MCH 27.8 26.0 - 34.0 PG    MCHC 31.3 30.0 - 36.5 g/dL    RDW 13.1 11.5 - 14.5 %    PLATELET 024 284 - 938 K/uL    MPV 10.7 8.9 - 12.9 FL    NRBC 0.0 0  WBC    ABSOLUTE NRBC 0.00 0.00 - 0.01 K/uL    NEUTROPHILS 54 32 - 75 %    LYMPHOCYTES 36 12 - 49 %    MONOCYTES 7 5 - 13 %    EOSINOPHILS 2 0 - 7 %    BASOPHILS 1 0 - 1 %    IMMATURE GRANULOCYTES 0 0.0 - 0.5 %    ABS. NEUTROPHILS 2.8 1.8 - 8.0 K/UL    ABS. LYMPHOCYTES 1.9 0.8 - 3.5 K/UL    ABS. MONOCYTES 0.4 0.0 - 1.0 K/UL    ABS. EOSINOPHILS 0.1 0.0 - 0.4 K/UL    ABS. BASOPHILS 0.0 0.0 - 0.1 K/UL    ABS. IMM.  GRANS. 0.0 0.00 - 0.04 K/UL    DF AUTOMATED     GLUCOSE, POC    Collection Time: 06/01/21  7:41 AM   Result Value Ref Range    Glucose (POC) 90 65 - 117 mg/dL    Performed by Shaggy Mcdermott             Assessment:   · Melena: concern for UGI bleed, hgb 10.4  · Syncope  · CAD: Acute MI 5/19 status post stent on DAPT  · Negative COVID 19 testing 5/31     Patient Active Problem List   Diagnosis Code    STEMI involving [facility] coronary artery of inferior wall ([facility] [address] I21.19     Plan:   · On aspirin and Effient  · BID PPI  · NPO  · Plan for EGD today with Dr. Garrison Braga, risks discussed and patient is agreeable      Signed By: Sharlyne Duverney, NP     6/1/2021  9:22 AM          This patient was seen and examined by me in a face-to-face visit today. I reviewed the medical record including lab work, imaging and other provider notes. I confirmed the interval history as described above. I spoke to the patient, discussing our findings and plans. I discussed this case in detail with Thee Dowling NP. I formulated an updated  assessment of this patient and guided our treatment plan. I agree with the above progress note. I agree with the history, exam and assessment and plan as outlined in the note. I would like to add the following: Patient seen and examined. Plan for EGD to investigate source of melena in setting of aspirin and Effient. Last dose of Effient and ASA was today. Discussed procedure details and risks, and she is agreeable. Ezra Braga MD

## 2021-06-02 VITALS
OXYGEN SATURATION: 97 % | BODY MASS INDEX: 23.86 KG/M2 | WEIGHT: 152 LBS | DIASTOLIC BLOOD PRESSURE: 73 MMHG | SYSTOLIC BLOOD PRESSURE: 137 MMHG | RESPIRATION RATE: 17 BRPM | HEART RATE: 84 BPM | TEMPERATURE: 98 F | HEIGHT: 67 IN

## 2021-06-02 LAB
ANION GAP SERPL CALC-SCNC: 7 MMOL/L (ref 5–15)
ATRIAL RATE: 82 BPM
BUN SERPL-MCNC: 12 MG/DL (ref 6–20)
BUN/CREAT SERPL: 15 (ref 12–20)
CALCIUM SERPL-MCNC: 8.8 MG/DL (ref 8.5–10.1)
CALCULATED P AXIS, ECG09: 51 DEGREES
CALCULATED T AXIS, ECG11: -78 DEGREES
CHLORIDE SERPL-SCNC: 107 MMOL/L (ref 97–108)
CO2 SERPL-SCNC: 25 MMOL/L (ref 21–32)
CREAT SERPL-MCNC: 0.82 MG/DL (ref 0.55–1.02)
DIAGNOSIS, 93000: NORMAL
ERYTHROCYTE [DISTWIDTH] IN BLOOD BY AUTOMATED COUNT: 12.9 % (ref 11.5–14.5)
GLUCOSE BLD STRIP.AUTO-MCNC: 125 MG/DL (ref 65–117)
GLUCOSE BLD STRIP.AUTO-MCNC: 215 MG/DL (ref 65–117)
GLUCOSE SERPL-MCNC: 134 MG/DL (ref 65–100)
HCT VFR BLD AUTO: 34.2 % (ref 35–47)
HGB BLD-MCNC: 11.1 G/DL (ref 11.5–16)
MAGNESIUM SERPL-MCNC: 2.1 MG/DL (ref 1.6–2.4)
MCH RBC QN AUTO: 28.1 PG (ref 26–34)
MCHC RBC AUTO-ENTMCNC: 32.5 G/DL (ref 30–36.5)
MCV RBC AUTO: 86.6 FL (ref 80–99)
NRBC # BLD: 0 K/UL (ref 0–0.01)
NRBC BLD-RTO: 0 PER 100 WBC
P-R INTERVAL, ECG05: 177 MS
PLATELET # BLD AUTO: 271 K/UL (ref 150–400)
PMV BLD AUTO: 11.3 FL (ref 8.9–12.9)
POTASSIUM SERPL-SCNC: 4.1 MMOL/L (ref 3.5–5.1)
Q-T INTERVAL, ECG07: 424 MS
QRS DURATION, ECG06: 109 MS
QTC CALCULATION (BEZET), ECG08: 493 MS
RBC # BLD AUTO: 3.95 M/UL (ref 3.8–5.2)
SERVICE CMNT-IMP: ABNORMAL
SERVICE CMNT-IMP: ABNORMAL
SODIUM SERPL-SCNC: 139 MMOL/L (ref 136–145)
VENTRICULAR RATE, ECG03: 81 BPM
WBC # BLD AUTO: 4.5 K/UL (ref 3.6–11)

## 2021-06-02 PROCEDURE — 74011000250 HC RX REV CODE- 250: Performed by: INTERNAL MEDICINE

## 2021-06-02 PROCEDURE — 36415 COLL VENOUS BLD VENIPUNCTURE: CPT

## 2021-06-02 PROCEDURE — 83735 ASSAY OF MAGNESIUM: CPT

## 2021-06-02 PROCEDURE — 85027 COMPLETE CBC AUTOMATED: CPT

## 2021-06-02 PROCEDURE — 74011636637 HC RX REV CODE- 636/637: Performed by: INTERNAL MEDICINE

## 2021-06-02 PROCEDURE — C9113 INJ PANTOPRAZOLE SODIUM, VIA: HCPCS | Performed by: INTERNAL MEDICINE

## 2021-06-02 PROCEDURE — 74011250637 HC RX REV CODE- 250/637: Performed by: INTERNAL MEDICINE

## 2021-06-02 PROCEDURE — 80048 BASIC METABOLIC PNL TOTAL CA: CPT

## 2021-06-02 PROCEDURE — 74011250637 HC RX REV CODE- 250/637: Performed by: STUDENT IN AN ORGANIZED HEALTH CARE EDUCATION/TRAINING PROGRAM

## 2021-06-02 PROCEDURE — 74011250636 HC RX REV CODE- 250/636: Performed by: INTERNAL MEDICINE

## 2021-06-02 PROCEDURE — 82962 GLUCOSE BLOOD TEST: CPT

## 2021-06-02 RX ORDER — PANTOPRAZOLE SODIUM 40 MG/1
40 TABLET, DELAYED RELEASE ORAL 2 TIMES DAILY
Qty: 60 TABLET | Refills: 1 | Status: SHIPPED | OUTPATIENT
Start: 2021-06-02

## 2021-06-02 RX ADMIN — Medication 10 ML: at 07:09

## 2021-06-02 RX ADMIN — INSULIN LISPRO 3 UNITS: 100 INJECTION, SOLUTION INTRAVENOUS; SUBCUTANEOUS at 12:08

## 2021-06-02 RX ADMIN — ASPIRIN 81 MG: 81 TABLET, CHEWABLE ORAL at 08:48

## 2021-06-02 RX ADMIN — SODIUM CHLORIDE 40 MG: 9 INJECTION INTRAMUSCULAR; INTRAVENOUS; SUBCUTANEOUS at 08:47

## 2021-06-02 RX ADMIN — METOPROLOL SUCCINATE 25 MG: 25 TABLET, EXTENDED RELEASE ORAL at 08:48

## 2021-06-02 RX ADMIN — ATORVASTATIN CALCIUM 80 MG: 40 TABLET, FILM COATED ORAL at 08:47

## 2021-06-02 RX ADMIN — LOSARTAN POTASSIUM 25 MG: 25 TABLET, FILM COATED ORAL at 08:48

## 2021-06-02 RX ADMIN — HYDROCHLOROTHIAZIDE: 25 TABLET ORAL at 08:47

## 2021-06-02 RX ADMIN — PRASUGREL 10 MG: 10 TABLET, FILM COATED ORAL at 08:49

## 2021-06-02 NOTE — PROGRESS NOTES
Hospital follow-up PCP transitional care appointment has been scheduled with Dr. Dejan Nieto for Tuesday, 6/8/21 at 12:15 p.m. Pending patient discharge.   Michelle Tabares, Care Management Specialist.

## 2021-06-02 NOTE — PROGRESS NOTES
[facility] Cardiology Progress Note    Date of service: 6/2/2021    Subjective:   No further melena  No chest pain    Objective:    Visit Vitals  /68 (BP 1 Location: Right arm, BP Patient Position: At rest)   Pulse 91   Temp 98.7 °F (37.1 °C)   Resp 16   Ht 5' 7\" (1.702 m)   Wt 68.9 kg (152 lb)   SpO2 95%   BMI 23.81 kg/m²       Physical Exam  Constitutional:       Appearance: She is well-developed. HENT:      Head: Normocephalic and atraumatic. Eyes:      General: No scleral icterus. Conjunctiva/sclera: Conjunctivae normal.   Neck:      Vascular: No JVD. Cardiovascular:      Rate and Rhythm: Normal rate and regular rhythm. Heart sounds: Normal heart sounds. No murmur heard. No gallop. Pulmonary:      Effort: Pulmonary effort is normal. No respiratory distress. Breath sounds: Normal breath sounds. No stridor. No wheezing or rales. Abdominal:      General: There is no distension. Palpations: Abdomen is soft. Musculoskeletal:         General: No deformity. Skin:     General: Skin is warm and dry. Neurological:      Mental Status: She is alert and oriented to person, place, and time.    Psychiatric:         Behavior: Behavior normal.         Data reviewed:  Recent Results (from the past 12 hour(s))   GLUCOSE, POC    Collection Time: 06/01/21  9:44 PM   Result Value Ref Range    Glucose (POC) 113 65 - 117 mg/dL    Performed by FELIX Brenner    METABOLIC PANEL, BASIC    Collection Time: 06/02/21  4:44 AM   Result Value Ref Range    Sodium 139 136 - 145 mmol/L    Potassium 4.1 3.5 - 5.1 mmol/L    Chloride 107 97 - 108 mmol/L    CO2 25 21 - 32 mmol/L    Anion gap 7 5 - 15 mmol/L    Glucose 134 (H) 65 - 100 mg/dL    BUN 12 6 - 20 MG/DL    Creatinine 0.82 0.55 - 1.02 MG/DL    BUN/Creatinine ratio 15 12 - 20      GFR est AA >60 >60 ml/min/1.73m2    GFR est non-AA >60 >60 ml/min/1.73m2    Calcium 8.8 8.5 - 10.1 MG/DL   MAGNESIUM    Collection Time: 06/02/21  4:44 AM   Result Value Ref Range    Magnesium 2.1 1.6 - 2.4 mg/dL   CBC W/O DIFF    Collection Time: 06/02/21  4:44 AM   Result Value Ref Range    WBC 4.5 3.6 - 11.0 K/uL    RBC 3.95 3.80 - 5.20 M/uL    HGB 11.1 (L) 11.5 - 16.0 g/dL    HCT 34.2 (L) 35.0 - 47.0 %    MCV 86.6 80.0 - 99.0 FL    MCH 28.1 26.0 - 34.0 PG    MCHC 32.5 30.0 - 36.5 g/dL    RDW 12.9 11.5 - 14.5 %    PLATELET 565 929 - 948 K/uL    MPV 11.3 8.9 - 12.9 FL    NRBC 0.0 0  WBC    ABSOLUTE NRBC 0.00 0.00 - 0.01 K/uL   GLUCOSE, POC    Collection Time: 06/02/21  7:15 AM   Result Value Ref Range    Glucose (POC) 125 (H) 65 - 117 mg/dL    Performed by FELIX Brenner      EGD 6/1/21  Findings:   Esophagus:normal  Stomach: small hiatal hernia. In the mid to distal body a large 3.5 cm pedunculated polyp with broad base extending into pylorus was seen. There was mucosal ulceration on the polyp. In setting of Effient dosing, biopsies/removal not performed today. Duodenum: normal to second portion    Assessment:    1. Upper GI bleed secondary to gastric polyp  2. Syncope  3. Recent acute MI on 5/19  4. CAD with recent MI as above, now s/p PCI of the RCA  No acute ischemia  5. 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 HTN    Plan:    Continue ASA and Effient - ideally at least 1 month uninterrupted post MI (ie through June 19th). Continue high dose PPI    Ok for discharge today from CV perspective  Will need close f/u with Dr April Bae (her Cardiologist who treated her for index MI event) to make final decision on timing of DAPT interruption to facilitate repeat EGD. Signed:  Viktoriya Masters MD  Interventional Cardiology  6/2/2021

## 2021-06-02 NOTE — DISCHARGE SUMMARY
Discharge Summary     PATIENT ID: Jeremy Duff  MRN: [id number]   YOB: 1955    DATE OF ADMISSION: 5/31/2021  6:38 AM    DATE OF DISCHARGE: 6/2/2021  PRIMARY CARE PROVIDER: Reyna Echeverria MD   DISCHARGING PROVIDER: Yayo Espinoza MD    To contact this individual call [phone] and ask the  to page. If unavailable ask to be transferred the Adult Hospitalist Department. CONSULTATIONS: IP CONSULT TO CARDIOLOGY  IP CONSULT TO GASTROENTEROLOGY    PROCEDURES/SURGERIES: Procedure(s):  ESOPHAGOGASTRODUODENOSCOPY (EGD)    ADMITTING DIAGNOSES & HOSPITAL COURSE:   The patient is 78years old woman with past medical history significant for recent STEMI with BRANDON this month, diabetes mellitus, hyperlipidemia, hypertension presented to the emergency department due to syncope and melena.  Patient reported that her symptoms started yesterday when she so black stool multiple times when she was having bowel movements then she called the pharmacist who advised her to go to the emergency department. Yamila Jackson reported that she went to the emergency department and she was sent home but her symptoms were recurrent and she had more black stool when she went back home.   She said after she went back from the emergency department, she passed out for 1 to 2 minutes that was witnessed by her . Yamila Jackson said after that she was very concerned because she saw more black stool and she was talking on the phone with her sister when she passed out again. Yamila Jackson denied any chest pain, abdominal pain, nausea, diarrhea, shortness of breath, dizziness, palpitations or any other symptoms before or after her syncope.  Patient reported that she has been taking her medications including her dual antiplatelets.  She also reported 1 episode of vomiting last night with no blood but she never had nausea or vomiting after that.   Patient does not smoke, drink alcohol or use illicit drugs.   In the emergency department patient was found to have hemoglobin around 11. DISCHARGE DIAGNOSES / PLAN:      Acute melena likely upper GI bleed, POA  Acute blood loss anemia, POA  Syncope 2/2 blood loss, POA  QTC prolongation, POA  On DAPT s/p recent PCI  Cont PPI   on admission  Appreciate GI: s/p EGD 6/1 with Large pedunculated gastric polyp with mucosal ulceration - likely source of bleeding; Repeat EGD with plan for EUS and EMR once she can have Effient safely held  hgb stable/increased today; no recurrent bleeding/melena  cont to monitor closely on DAPT     Coronary artery disease with recent STEMI s/p BRANDON 5/2021  Continue metoprolol, statin, spironolactone, HCTZ, lisinopril  Appreciate cardiology: Since she had a very recent STEMI and PCI, anti-platelets will need to be continued unless there is life-threatening bleeding  Monitor hgb  Continue ASA and Effient - ideally at least 1 month uninterrupted post MI (ie through June 19th).    Continue high dose PPI  Echo 55-60%  Will need close f/u with Dr Patric Edge (her Cardiologist who treated her for index MI event) to make final decision on timing of DAPT interruption to facilitate repeat EGD    Hypertension controlled  Cont home meds as above     Hyperlipidemia  Continue home statin     Hypokalemia, repleted  Mag ok     ADDITIONAL CARE RECOMMENDATIONS: f/u with PCP, GI, cardiology    PENDING TEST RESULTS:   At the time of discharge the following test results are still pending: none    Patient Instructions     FOLLOW UP APPOINTMENTS:    Follow-up Information     Follow up With Specialties Details Why Contact Info    Carson Khan MD Internal Medicine   [facility]      Leonila Zheng MD Cardiology, Cardio Vascular Surgery Schedule an appointment as soon as possible for a visit  [address]      Mart Joseph MD Gastroenterology   [address] [place] [address] [address]  Gastrointestinal [address] [address]  [phone]           DIET: Cardiac Diet    ACTIVITY: Activity as tolerated    NOTIFY YOUR PHYSICIAN FOR ANY OF THE FOLLOWING:   Fever over 101 degrees for 24 hours. Chest pain, shortness of breath, fever, chills, nausea, vomiting, diarrhea, change in mentation, falling, weakness, bleeding. Severe pain or pain not relieved by medications. Or, any other signs or symptoms that you may have questions about. DISPOSITION:  x  Home With:family   OT  PT  HH  RN       Long term SNF/Inpatient Rehab    Independent/assisted living    Hospice    Other:       PATIENT CONDITION AT DISCHARGE:   Functional status    Poor     Deconditioned    x Independent      Cognition   x  Lucid     Forgetful     Dementia      Catheters/lines (plus indication)    Lord     PICC     PEG    x None      Code status    x Full code     DNR      PHYSICAL EXAMINATION AT DISCHARGE:  General:  Alert, cooperative, no distress  Lungs:   Clear to auscultation bilaterally, symmetric expansion, no respiratory distress  Chest wall:  No tenderness or deformity  Heart:   Regular rate and rhythm  Abdomen:   Soft, non-tender  Extremities: Extremities normal, atraumatic, no cyanosis or edema  Skin:  Skin color, texture, turgor normal. No rashes or lesions  Neurologic: CNII-XII intact. RUSSELL    CHRONIC MEDICAL DIAGNOSES:  Problem List as of 6/2/2021 Never Reviewed        Codes Class Noted - Resolved    GI bleed ICD-10-CM: K92.2  ICD-9-CM: 578.9  6/1/2021 - Present        Upper GI bleed ICD-10-CM: K92.2  ICD-9-CM: 578.9  6/1/2021 - Present        STEMI involving [facility] coronary artery of inferior wall ([facility] [address] ICD-10-CM: I21.19  ICD-9-CM: 410.40  5/18/2021 - Present              DISCHARGE MEDICATIONS:  Current Discharge Medication List      START taking these medications    Details   pantoprazole (Protonix) 40 mg tablet Take 1 Tablet by mouth two (2) times a day.   Qty: 60 Tablet, Refills: 1  Start date: 6/2/2021         CONTINUE these medications which have NOT CHANGED    Details   fluocinolone acetonide oiL 0.01 % drop       pioglitazone (ACTOS) 30 mg tablet       spironolactone-hydrochlorothiazide (ALDACTAZIDE) 25-25 mg per tablet       aspirin 81 mg chewable tablet Take 1 Tablet by mouth daily. Qty: 30 Tablet, Refills: 0      atorvastatin (LIPITOR) 80 mg tablet Take 1 Tablet by mouth daily. Qty: 30 Tablet, Refills: 0      losartan (COZAAR) 25 mg tablet Take 1 Tablet by mouth daily. Qty: 30 Tablet, Refills: 0      metoprolol succinate (TOPROL-XL) 25 mg XL tablet Take 1 Tablet by mouth daily. Qty: 30 Tablet, Refills: 0      prasugreL (EFFIENT) 10 mg tablet Take 1 Tablet by mouth daily.   Qty: 30 Tablet, Refills: 0    Comments: Start taking 5/22/2021           Greater than 30 minutes were spent with the patient on counseling and coordination of care    Signed:   Yayo Espinoza MD  6/2/2021  8:52 AM

## 2021-06-03 ENCOUNTER — PATIENT OUTREACH (OUTPATIENT)
Dept: CASE MANAGEMENT | Age: 66
End: 2021-06-03

## 2021-06-03 NOTE — PROGRESS NOTES
Care Transitions Outreach Attempt    Call within 2 business days of discharge: Yes   Attempted to reach patient for transitions of care follow up. Unable to reach patient    Patient: Jyoti Olivarez Patient : 1955 MRN: [id number]    Last Discharge [address]       Complaint Diagnosis Description Type Department Provider    21   Admission (Discharged) Glenda Sotelo MD        Was this an external facility discharge? No Discharge Facility: n/a    Noted following upcoming appointments from discharge chart review:   [facility] follow up appointment(s): No future appointments.   Non-[facility] follow up appointment(s):   PCP- [email or web address]

## 2021-06-11 ENCOUNTER — PATIENT OUTREACH (OUTPATIENT)
Dept: CASE MANAGEMENT | Age: 66
End: 2021-06-11

## 2021-06-11 NOTE — PROGRESS NOTES
Care Transitions Outreach Attempt    Call within 2 business days of discharge: Yes   Attempted to reach patient for transitions of care follow up. Unable to reach patient, 3rd outreach attempted letter sent. Patient: Brad Dry Patient : 1955 MRN: [id number]    Last Discharge [address]       Complaint Diagnosis Description Type Department Provider    21   Admission (Discharged) Rylee Ramirez MD          Was this an external facility discharge? No Discharge Facility: n/a    Noted following upcoming appointments from discharge chart review:   [address] follow up appointment(s): No future appointments.   Non-[facility] follow up appointment(s): none

## 2021-06-11 NOTE — LETTER
[facility] CARE COORDINATION  No information on file. Dear Jimenez Eddy    My name is Juanpablo Gonzales and I am a Care Transition Nurse who partners with Kailee Morales to improve patients' health. I've been trying to reach you via phone to let you know that, as a member of your care team, I will work with other providers involved in your care, offer education for your specific health conditions, and connect you with more resources as needed. This program is designed to provide you with the opportunity to have a ([id number] [facility]/[address]) care manager partner with you for the following situations:     1) if you come home from the hospital or emergency room   2) to help manage your disease   3) when you would like assistance coordinating services or appointments    This added support is provided at no additional cost to you. My primary focus is to help you achieve specific goals and improve your health. Please call me at [phone] to further discuss how I can support your health care needs.     Sincerely,  [facility] Transition Nurse  Phone- [phone]

## 2021-08-03 PROBLEM — K92.2 GI BLEED: Status: RESOLVED | Noted: 2021-06-01 | Resolved: 2021-08-03

## 2021-10-03 ENCOUNTER — HOSPITAL ENCOUNTER (EMERGENCY)
Age: 66
Discharge: HOME | End: 2021-10-03
Attending: EMERGENCY MEDICINE
Payer: MEDICARE

## 2021-10-03 VITALS
WEIGHT: 155 LBS | DIASTOLIC BLOOD PRESSURE: 84 MMHG | RESPIRATION RATE: 18 BRPM | TEMPERATURE: 98.4 F | OXYGEN SATURATION: 99 % | HEIGHT: 67 IN | HEART RATE: 71 BPM | BODY MASS INDEX: 24.33 KG/M2 | SYSTOLIC BLOOD PRESSURE: 174 MMHG

## 2021-10-03 DIAGNOSIS — L29.9 PRURITUS: Primary | ICD-10-CM

## 2021-10-03 PROCEDURE — 99282 EMERGENCY DEPT VISIT SF MDM: CPT

## 2021-10-03 NOTE — ED PROVIDER NOTES
EMERGENCY DEPARTMENT HISTORY AND PHYSICAL EXAM      Date: 10/3/2021  Patient Name: Emily Reed    History of Presenting Illness     Chief Complaint   Patient presents with    Skin Problem       History Provided By: Patient    HPI: Emily Reed, 78 y.o. female with a past medical history significant hypertension presents to the ED with cc of itching to right foot in the first interdigital space for the past 4 days and today patient noticed a dark spot in the same area, denies any pain at this time    There are no other complaints, changes, or physical findings at this time. PCP: Reyna Echeverria MD    No current facility-administered medications on file prior to encounter. Current Outpatient Medications on File Prior to Encounter   Medication Sig Dispense Refill    pantoprazole (Protonix) 40 mg tablet Take 1 Tablet by mouth two (2) times a day. 60 Tablet 1    fluocinolone acetonide oiL 0.01 % drop       pioglitazone (ACTOS) 30 mg tablet       spironolactone-hydrochlorothiazide (ALDACTAZIDE) 25-25 mg per tablet       aspirin 81 mg chewable tablet Take 1 Tablet by mouth daily. 30 Tablet 0    atorvastatin (LIPITOR) 80 mg tablet Take 1 Tablet by mouth daily. 30 Tablet 0    losartan (COZAAR) 25 mg tablet Take 1 Tablet by mouth daily. 30 Tablet 0    metoprolol succinate (TOPROL-XL) 25 mg XL tablet Take 1 Tablet by mouth daily. 30 Tablet 0    prasugreL (EFFIENT) 10 mg tablet Take 1 Tablet by mouth daily. 30 Tablet 0       Past History     Past Medical History:  Past Medical History:   Diagnosis Date    Hypertension     Menopause        Past Surgical History:  No past surgical history on file. Family History:  No family history on file.     Social History:  Social History     Tobacco Use    Smoking status: Never Smoker    Smokeless tobacco: Never Used   Substance Use Topics    Alcohol use: Not Currently    Drug use: Not Currently       Allergies:  No Known Allergies      Review of Systems Review of Systems   Constitutional: Negative for chills and fever. HENT: Negative for rhinorrhea and sore throat. Eyes: Negative for pain and visual disturbance. Respiratory: Negative for cough and shortness of breath. Cardiovascular: Negative for chest pain and leg swelling. Gastrointestinal: Negative for abdominal pain and vomiting. Endocrine: Negative for polydipsia and polyuria. Genitourinary: Negative for dysuria and urgency. Musculoskeletal: Negative for back pain and neck pain. Skin: Positive for color change. Negative for rash. Neurological: Negative for weakness and numbness. Psychiatric/Behavioral: Negative. Physical Exam     Physical Exam  Vitals and nursing note reviewed. Constitutional:       General: She is not in acute distress. Appearance: Normal appearance. She is not ill-appearing, toxic-appearing or diaphoretic. HENT:      Head: Normocephalic and atraumatic. Mouth/Throat:      Mouth: Mucous membranes are moist.      Pharynx: Oropharynx is clear. Eyes:      Conjunctiva/sclera: Conjunctivae normal.      Pupils: Pupils are equal, round, and reactive to light. Cardiovascular:      Rate and Rhythm: Normal rate and regular rhythm. Pulses: Normal pulses. Heart sounds: Normal heart sounds. Pulmonary:      Effort: Pulmonary effort is normal.      Breath sounds: Normal breath sounds. Abdominal:      General: Bowel sounds are normal.      Palpations: Abdomen is soft. Musculoskeletal:         General: No swelling, tenderness, deformity or signs of injury. Normal range of motion. Cervical back: Normal range of motion and neck supple. Right lower leg: No edema. Left lower leg: No edema. Feet:    Feet:      Right foot:      Skin integrity: Skin integrity normal. No ulcer, blister, skin breakdown, erythema or warmth. Toenail Condition: Right toenails are long.       Left foot:      Skin integrity: Skin integrity normal. Skin:     General: Skin is warm and dry. Capillary Refill: Capillary refill takes less than 2 seconds. Findings: Lesion present. Neurological:      General: No focal deficit present. Mental Status: She is alert and oriented to person, place, and time. Psychiatric:         Mood and Affect: Mood normal.         Behavior: Behavior normal.         Lab and Diagnostic Study Results     Labs -   No results found for this or any previous visit (from the past 12 hour(s)). Radiologic Studies -   @lastxrresult@  CT Results  (Last 48 hours)    None        CXR Results  (Last 48 hours)    None            Medical Decision Making   - I am the first provider for this patient. - I reviewed the vital signs, available nursing notes, past medical history, past surgical history, family history and social history. - Initial assessment performed. The patients presenting problems have been discussed, and they are in agreement with the care plan formulated and outlined with them. I have encouraged them to ask questions as they arise throughout their visit. Vital Signs-Reviewed the patient's vital signs. Patient Vitals for the past 12 hrs:   Temp Pulse Resp BP SpO2   10/03/21 1248 98.4 °F (36.9 °C) 71 18 (!) 174/84 98 %       Records Reviewed: Nursing Notes    The patient presents with foot itch with a differential diagnosis of cellulitis, fungal infection, allergic reaction      ED Course:          Provider Notes (Medical Decision Making): MDM       Procedures   Medical Decision Makingedical Decision Making  Performed by: Edith Omalley MD  PROCEDURES:  Procedures       Disposition   Disposition: Condition stable  DC- Adult Discharges: All of the diagnostic tests were reviewed and questions answered. Diagnosis, care plan and treatment options were discussed. The patient understands the instructions and will follow up as directed. The patients results have been reviewed with them.   They have been counseled regarding their diagnosis. The patient verbally convey understanding and agreement of the signs, symptoms, diagnosis, treatment and prognosis and additionally agrees to follow up as recommended with their PCP in 24 - 48 hours. They also agree with the care-plan and convey that all of their questions have been answered. I have also put together some discharge instructions for them that include: 1) educational information regarding their diagnosis, 2) how to care for their diagnosis at home, as well a 3) list of reasons why they would want to return to the ED prior to their follow-up appointment, should their condition change. DISCHARGE PLAN:  1. Current Discharge Medication List      CONTINUE these medications which have NOT CHANGED    Details   pantoprazole (Protonix) 40 mg tablet Take 1 Tablet by mouth two (2) times a day. Qty: 60 Tablet, Refills: 1      fluocinolone acetonide oiL 0.01 % drop       pioglitazone (ACTOS) 30 mg tablet       spironolactone-hydrochlorothiazide (ALDACTAZIDE) 25-25 mg per tablet       aspirin 81 mg chewable tablet Take 1 Tablet by mouth daily. Qty: 30 Tablet, Refills: 0      atorvastatin (LIPITOR) 80 mg tablet Take 1 Tablet by mouth daily. Qty: 30 Tablet, Refills: 0      losartan (COZAAR) 25 mg tablet Take 1 Tablet by mouth daily. Qty: 30 Tablet, Refills: 0      metoprolol succinate (TOPROL-XL) 25 mg XL tablet Take 1 Tablet by mouth daily. Qty: 30 Tablet, Refills: 0      prasugreL (EFFIENT) 10 mg tablet Take 1 Tablet by mouth daily. Qty: 30 Tablet, Refills: 0    Comments: Start taking 5/22/2021           2. Follow-up Information    None       3. Return to ED if worse   4. Current Discharge Medication List            Diagnosis     Clinical Impression: No diagnosis found. Attestations:    Penelope Tay MD    Please note that this dictation was completed with [email or web address], the [employer] voice recognition software.   Quite often unanticipated grammatical, syntax, homophones, and other interpretive errors are inadvertently transcribed by the computer software. Please disregard these errors. Please excuse any errors that have escaped final proofreading. Thank you.

## 2021-10-03 NOTE — ED TRIAGE NOTES
Reports that her right foot has been itching for about a week. States that this morning she noticed something between her toes & is not sure if that is what is causing the itching. Reports hx of diabetes.

## 2021-10-11 ENCOUNTER — TRANSCRIBE ORDER (OUTPATIENT)
Dept: SCHEDULING | Age: 66
End: 2021-10-11

## 2021-10-11 DIAGNOSIS — R10.9 ABDOMINAL PAIN: Primary | ICD-10-CM

## 2021-11-03 RX ORDER — SODIUM CHLORIDE 9 MG/ML
25 INJECTION, SOLUTION INTRAVENOUS CONTINUOUS
Status: DISCONTINUED | OUTPATIENT
Start: 2021-11-03 | End: 2021-11-03

## 2021-11-03 RX ORDER — SODIUM CHLORIDE, SODIUM LACTATE, POTASSIUM CHLORIDE, CALCIUM CHLORIDE 600; 310; 30; 20 MG/100ML; MG/100ML; MG/100ML; MG/100ML
50 INJECTION, SOLUTION INTRAVENOUS CONTINUOUS
Status: DISCONTINUED | OUTPATIENT
Start: 2021-11-03 | End: 2021-11-03

## 2021-11-08 ENCOUNTER — HOSPITAL ENCOUNTER (OUTPATIENT)
Dept: PREADMISSION TESTING | Age: 66
Discharge: HOME | End: 2021-11-08
Payer: MEDICARE

## 2021-11-08 LAB — SARS-COV-2, COV2: NORMAL

## 2021-11-08 PROCEDURE — U0005 INFEC AGEN DETEC AMPLI PROBE: HCPCS

## 2021-11-09 LAB
SARS-COV-2, XPLCVT: NOT DETECTED
SOURCE, COVRS: NORMAL

## 2021-11-12 ENCOUNTER — ANESTHESIA EVENT (OUTPATIENT)
Dept: ENDOSCOPY | Age: 66
End: 2021-11-12
Payer: MEDICARE

## 2021-11-12 ENCOUNTER — APPOINTMENT (OUTPATIENT)
Dept: ENDOSCOPY | Age: 66
End: 2021-11-12
Attending: INTERNAL MEDICINE
Payer: MEDICARE

## 2021-11-12 ENCOUNTER — ANESTHESIA (OUTPATIENT)
Dept: ENDOSCOPY | Age: 66
End: 2021-11-12
Payer: MEDICARE

## 2021-11-12 ENCOUNTER — HOSPITAL ENCOUNTER (OUTPATIENT)
Age: 66
Setting detail: OUTPATIENT SURGERY
Discharge: HOME | End: 2021-11-12
Attending: INTERNAL MEDICINE | Admitting: INTERNAL MEDICINE
Payer: MEDICARE

## 2021-11-12 VITALS
OXYGEN SATURATION: 99 % | SYSTOLIC BLOOD PRESSURE: 153 MMHG | HEIGHT: 67 IN | RESPIRATION RATE: 16 BRPM | DIASTOLIC BLOOD PRESSURE: 78 MMHG | HEART RATE: 78 BPM | WEIGHT: 157.6 LBS | BODY MASS INDEX: 24.74 KG/M2 | TEMPERATURE: 98.3 F

## 2021-11-12 LAB
GLUCOSE BLD STRIP.AUTO-MCNC: 129 MG/DL (ref 65–117)
PERFORMED BY, TECHID: ABNORMAL

## 2021-11-12 PROCEDURE — 74011250636 HC RX REV CODE- 250/636: Performed by: INTERNAL MEDICINE

## 2021-11-12 PROCEDURE — 76040000008: Performed by: INTERNAL MEDICINE

## 2021-11-12 PROCEDURE — 77030013992 HC SNR POLYP ENDOSC BSC -B: Performed by: INTERNAL MEDICINE

## 2021-11-12 PROCEDURE — 74011250636 HC RX REV CODE- 250/636: Performed by: ANESTHESIOLOGY

## 2021-11-12 PROCEDURE — 2709999900 HC NON-CHARGEABLE SUPPLY: Performed by: INTERNAL MEDICINE

## 2021-11-12 PROCEDURE — 76060000033 HC ANESTHESIA 1 TO 1.5 HR: Performed by: INTERNAL MEDICINE

## 2021-11-12 PROCEDURE — 82962 GLUCOSE BLOOD TEST: CPT

## 2021-11-12 PROCEDURE — 77030011223 HC DEV LIG POLYLP OCOA -B: Performed by: INTERNAL MEDICINE

## 2021-11-12 PROCEDURE — 77030010936 HC CLP LIG BSC -C: Performed by: INTERNAL MEDICINE

## 2021-11-12 PROCEDURE — 74011000250 HC RX REV CODE- 250: Performed by: ANESTHESIOLOGY

## 2021-11-12 PROCEDURE — 88305 TISSUE EXAM BY PATHOLOGIST: CPT

## 2021-11-12 RX ORDER — PROPOFOL 10 MG/ML
INJECTION, EMULSION INTRAVENOUS AS NEEDED
Status: DISCONTINUED | OUTPATIENT
Start: 2021-11-12 | End: 2021-11-12 | Stop reason: HOSPADM

## 2021-11-12 RX ORDER — LIDOCAINE HYDROCHLORIDE 20 MG/ML
INJECTION, SOLUTION EPIDURAL; INFILTRATION; INTRACAUDAL; PERINEURAL AS NEEDED
Status: DISCONTINUED | OUTPATIENT
Start: 2021-11-12 | End: 2021-11-12 | Stop reason: HOSPADM

## 2021-11-12 RX ORDER — GLIMEPIRIDE 4 MG/1
4 TABLET ORAL
COMMUNITY
End: 2022-08-04

## 2021-11-12 RX ORDER — PROPOFOL 10 MG/ML
INJECTION, EMULSION INTRAVENOUS
Status: COMPLETED
Start: 2021-11-12 | End: 2021-11-12

## 2021-11-12 RX ORDER — SODIUM CHLORIDE 9 MG/ML
25 INJECTION, SOLUTION INTRAVENOUS CONTINUOUS
Status: DISCONTINUED | OUTPATIENT
Start: 2021-11-12 | End: 2021-11-12 | Stop reason: HOSPADM

## 2021-11-12 RX ORDER — SODIUM CHLORIDE, SODIUM LACTATE, POTASSIUM CHLORIDE, CALCIUM CHLORIDE 600; 310; 30; 20 MG/100ML; MG/100ML; MG/100ML; MG/100ML
INJECTION, SOLUTION INTRAVENOUS
Status: DISCONTINUED | OUTPATIENT
Start: 2021-11-12 | End: 2021-11-12 | Stop reason: HOSPADM

## 2021-11-12 RX ORDER — LANOLIN ALCOHOL/MO/W.PET/CERES
CREAM (GRAM) TOPICAL
COMMUNITY
End: 2022-08-04

## 2021-11-12 RX ORDER — LIDOCAINE HYDROCHLORIDE 20 MG/ML
INJECTION, SOLUTION EPIDURAL; INFILTRATION; INTRACAUDAL; PERINEURAL
Status: COMPLETED
Start: 2021-11-12 | End: 2021-11-12

## 2021-11-12 RX ORDER — SODIUM CHLORIDE, SODIUM LACTATE, POTASSIUM CHLORIDE, CALCIUM CHLORIDE 600; 310; 30; 20 MG/100ML; MG/100ML; MG/100ML; MG/100ML
50 INJECTION, SOLUTION INTRAVENOUS CONTINUOUS
Status: DISCONTINUED | OUTPATIENT
Start: 2021-11-12 | End: 2021-11-12 | Stop reason: HOSPADM

## 2021-11-12 RX ORDER — CLOPIDOGREL BISULFATE 75 MG/1
TABLET ORAL
COMMUNITY

## 2021-11-12 RX ADMIN — SODIUM CHLORIDE, POTASSIUM CHLORIDE, SODIUM LACTATE AND CALCIUM CHLORIDE 50 ML/HR: 600; 310; 30; 20 INJECTION, SOLUTION INTRAVENOUS at 09:26

## 2021-11-12 RX ADMIN — PROPOFOL 50 MG: 10 INJECTION, EMULSION INTRAVENOUS at 10:17

## 2021-11-12 RX ADMIN — SODIUM CHLORIDE, POTASSIUM CHLORIDE, SODIUM LACTATE AND CALCIUM CHLORIDE: 600; 310; 30; 20 INJECTION, SOLUTION INTRAVENOUS at 10:05

## 2021-11-12 RX ADMIN — PROPOFOL 50 MG: 10 INJECTION, EMULSION INTRAVENOUS at 10:07

## 2021-11-12 RX ADMIN — PROPOFOL 50 MG: 10 INJECTION, EMULSION INTRAVENOUS at 10:14

## 2021-11-12 RX ADMIN — PROPOFOL 50 MG: 10 INJECTION, EMULSION INTRAVENOUS at 10:12

## 2021-11-12 RX ADMIN — PROPOFOL 50 MG: 10 INJECTION, EMULSION INTRAVENOUS at 10:10

## 2021-11-12 RX ADMIN — PROPOFOL 50 MG: 10 INJECTION, EMULSION INTRAVENOUS at 10:05

## 2021-11-12 RX ADMIN — LIDOCAINE HYDROCHLORIDE 100 MG: 20 INJECTION, SOLUTION EPIDURAL; INFILTRATION; INTRACAUDAL; PERINEURAL at 10:05

## 2021-11-12 RX ADMIN — PROPOFOL 21 MG: 10 INJECTION, EMULSION INTRAVENOUS at 10:22

## 2021-11-12 RX ADMIN — PROPOFOL 21 MG: 10 INJECTION, EMULSION INTRAVENOUS at 10:30

## 2021-11-12 NOTE — ANESTHESIA POSTPROCEDURE EVALUATION
Procedure(s):  ENDOSCOPIC ULTRASOUND (EUS)  ESOPHAGOGASTRODUODENOSCOPY (EGD)  ENDOSCOPIC POLYPECTOMY. total IV anesthesia, MAC    Anesthesia Post Evaluation      Multimodal analgesia: multimodal analgesia not used between 6 hours prior to anesthesia start to PACU discharge  Patient location during evaluation: bedside (Endoscopy suite)  Patient participation: complete - patient cannot participate  Level of consciousness: sleepy but conscious  Pain score: 0  Pain management: adequate  Airway patency: patent  Anesthetic complications: no  Cardiovascular status: acceptable  Respiratory status: acceptable and nasal cannula  Hydration status: acceptable  Comments: This patient remained on the stretcher. The patient was handed off to the endoscopy nursing team.  All questions regarding pre-, intra-, and postoperative care were answered. Post anesthesia nausea and vomiting:  none      INITIAL Post-op Vital signs: No vitals data found for the desired time range.

## 2021-11-12 NOTE — DISCHARGE INSTRUCTIONS
No anticoagulants/ blood thinners including aspirin, plavix (clopidogrel), and Effient (prasugrel) til follow up as outpatient in 2 weeks

## 2021-11-12 NOTE — PERIOP NOTES
Patient alert and oriented x 4 with respirations even and unlabored on RA. Patient discharged home per physician's order. Patient verbalizes understanding of discharge instructions. Patient transported via wheelchair to front hospital entrance with patient's son present to transport patient via private vehicle.

## 2021-11-12 NOTE — ANESTHESIA PREPROCEDURE EVALUATION
Relevant Problems   CARDIOVASCULAR   (+) STEMI involving oth coronary artery of inferior wall [facility])       Anesthetic History   No history of anesthetic complications            Review of Systems / Medical History  Patient summary reviewed, nursing notes reviewed and pertinent labs reviewed    Pulmonary  Within defined limits                 Neuro/Psych   Within defined limits           Cardiovascular    Hypertension          Past MI, CAD, cardiac stents and hyperlipidemia    Exercise tolerance: >4 METS  Comments: Echo:  LV: Estimated LVEF is 55 - 60%. Normal cavity size, wall thickness and systolic function (ejection fraction normal). Wall motion: normal. Mild (grade 1) left ventricular diastolic dysfunction   GI/Hepatic/Renal                Endo/Other    Diabetes         Other Findings            Past Medical History:   Diagnosis Date    Diabetes ([facility] [address]     History of heart artery stent     Hypertension     Menopause     Myocardial infarction [facility])     May 2021       Past Surgical History:   Procedure Laterality Date    HX HEART CATHETERIZATION      1 cardiac stent       Current Outpatient Medications   Medication Instructions    aspirin 81 mg, Oral, DAILY    atorvastatin (LIPITOR) 80 mg, Oral, DAILY    clopidogreL (PLAVIX) 75 mg tab Oral    diphenhydrAMINE (BENADRYL) 2 % topical cream Topical, 3 TIMES DAILY AS NEEDED    ferrous sulfate 325 mg (65 mg iron) tablet Oral, DAILY BEFORE BREAKFAST    fluocinolone acetonide oiL 0.01 % drop No dose, route, or frequency recorded.  glimepiride (AMARYL) 4 mg, Oral, 7AM    losartan (COZAAR) 25 mg, Oral, DAILY    metoprolol succinate (TOPROL-XL) 25 mg, Oral, DAILY    pantoprazole (PROTONIX) 40 mg, Oral, 2 TIMES DAILY    pioglitazone (ACTOS) 30 mg tablet No dose, route, or frequency recorded.  prasugreL (EFFIENT) 10 mg, Oral, DAILY    spironolactone-hydrochlorothiazide (ALDACTAZIDE) 25-25 mg per tablet No dose, route, or frequency recorded.        Current Facility-Administered Medications   Medication Dose Route Frequency    lidocaine (PF) (XYLOCAINE) 20 mg/mL (2 %) injection        propofoL (DIPRIVAN) 10 mg/mL injection        0.9% sodium chloride infusion  25 mL/hr IntraVENous CONTINUOUS    lactated Ringers infusion  50 mL/hr IntraVENous CONTINUOUS       Patient Vitals for the past 24 hrs:   Temp Pulse Resp BP SpO2   11/12/21 0915 36.7 °C (98 °F) 73 18 (!) 148/82 100 %       Lab Results   Component Value Date/Time    WBC 4.5 06/02/2021 04:44 AM    HGB 11.1 (L) 06/02/2021 04:44 AM    HCT 34.2 (L) 06/02/2021 04:44 AM    PLATELET 280 29/69/2419 04:44 AM    MCV 86.6 06/02/2021 04:44 AM     Lab Results   Component Value Date/Time    Sodium 139 06/02/2021 04:44 AM    Potassium 4.1 06/02/2021 04:44 AM    Chloride 107 06/02/2021 04:44 AM    CO2 25 06/02/2021 04:44 AM    Anion gap 7 06/02/2021 04:44 AM    Glucose 134 (H) 06/02/2021 04:44 AM    BUN 12 06/02/2021 04:44 AM    Creatinine 0.82 06/02/2021 04:44 AM    BUN/Creatinine ratio 15 06/02/2021 04:44 AM    GFR est AA >60 06/02/2021 04:44 AM    GFR est non-AA >60 06/02/2021 04:44 AM    Calcium 8.8 06/02/2021 04:44 AM     No results found for: APTT, PTP, INR, INREXT  Lab Results   Component Value Date/Time    Glucose 134 (H) 06/02/2021 04:44 AM    Glucose (POC) 129 (H) 11/12/2021 09:08 AM     Physical Exam    Airway  Mallampati: I  TM Distance: > 6 cm  Neck ROM: normal range of motion   Mouth opening: Normal     Cardiovascular    Rhythm: regular  Rate: normal         Dental    Dentition: Edentulous     Pulmonary  Breath sounds clear to auscultation               Abdominal         Other Findings            Anesthetic Plan    ASA: 3  Anesthesia type: total IV anesthesia and MAC          Induction: Intravenous  Anesthetic plan and risks discussed with: Patient and Family

## 2021-12-13 ENCOUNTER — HOSPITAL ENCOUNTER (OUTPATIENT)
Dept: LAB | Age: 66
Discharge: HOME | End: 2021-12-13

## 2021-12-13 ENCOUNTER — TRANSCRIBE ORDER (OUTPATIENT)
Dept: REGISTRATION | Age: 66
End: 2021-12-13

## 2021-12-13 DIAGNOSIS — K21.9 ESOPHAGEAL REFLUX: Primary | ICD-10-CM

## 2021-12-13 DIAGNOSIS — K21.9 ESOPHAGEAL REFLUX: ICD-10-CM

## 2022-01-05 LAB — HBA1C MFR BLD HPLC: 9.4 %

## 2022-01-13 ENCOUNTER — HOSPITAL ENCOUNTER (OUTPATIENT)
Age: 67
Discharge: HOME | End: 2022-01-14
Attending: INTERNAL MEDICINE | Admitting: INTERNAL MEDICINE
Payer: MEDICARE

## 2022-01-13 DIAGNOSIS — I25.10 CORONARY ARTERY DISEASE INVOLVING NATIVE HEART, UNSPECIFIED VESSEL OR LESION TYPE, UNSPECIFIED WHETHER ANGINA PRESENT: ICD-10-CM

## 2022-01-13 DIAGNOSIS — I20.0 UNSTABLE ANGINA (HCC): ICD-10-CM

## 2022-01-13 LAB
ALBUMIN SERPL-MCNC: 3.3 G/DL (ref 3.5–5)
ALBUMIN/GLOB SERPL: 0.8 {RATIO} (ref 1.1–2.2)
ALP SERPL-CCNC: 139 U/L (ref 45–117)
ALT SERPL-CCNC: 20 U/L (ref 12–78)
ANION GAP SERPL CALC-SCNC: 3 MMOL/L (ref 5–15)
APPEARANCE UR: CLEAR
APTT PPP: 28.6 SEC (ref 21.2–34.1)
AST SERPL W P-5'-P-CCNC: 19 U/L (ref 15–37)
BACTERIA URNS QL MICRO: NEGATIVE /HPF
BILIRUB SERPL-MCNC: 0.5 MG/DL (ref 0.2–1)
BILIRUB UR QL: NEGATIVE
BUN SERPL-MCNC: 11 MG/DL (ref 6–20)
BUN/CREAT SERPL: 11 (ref 12–20)
CA-I BLD-MCNC: 9.5 MG/DL (ref 8.5–10.1)
CHLORIDE SERPL-SCNC: 105 MMOL/L (ref 97–108)
CO2 SERPL-SCNC: 31 MMOL/L (ref 21–32)
COLOR UR: NORMAL
CREAT SERPL-MCNC: 0.99 MG/DL (ref 0.55–1.02)
ERYTHROCYTE [DISTWIDTH] IN BLOOD BY AUTOMATED COUNT: 12.7 % (ref 11.5–14.5)
GLOBULIN SER CALC-MCNC: 4.4 G/DL (ref 2–4)
GLUCOSE BLD STRIP.AUTO-MCNC: 99 MG/DL (ref 65–117)
GLUCOSE SERPL-MCNC: 96 MG/DL (ref 65–100)
GLUCOSE UR STRIP.AUTO-MCNC: NEGATIVE MG/DL
HCT VFR BLD AUTO: 36.7 % (ref 35–47)
HGB BLD-MCNC: 11.7 G/DL (ref 11.5–16)
HGB UR QL STRIP: NEGATIVE
INR PPP: 1.1 (ref 0.9–1.1)
KETONES UR QL STRIP.AUTO: NEGATIVE MG/DL
LEUKOCYTE ESTERASE UR QL STRIP.AUTO: NEGATIVE
MCH RBC QN AUTO: 28.8 PG (ref 26–34)
MCHC RBC AUTO-ENTMCNC: 31.9 G/DL (ref 30–36.5)
MCV RBC AUTO: 90.4 FL (ref 80–99)
MUCOUS THREADS URNS QL MICRO: NORMAL /LPF
NITRITE UR QL STRIP.AUTO: NEGATIVE
NRBC # BLD: 0 K/UL (ref 0–0.01)
NRBC BLD-RTO: 0 PER 100 WBC
PERFORMED BY, TECHID: NORMAL
PH UR STRIP: 8 [PH] (ref 5–8)
PLATELET # BLD AUTO: 220 K/UL (ref 150–400)
PMV BLD AUTO: 10.7 FL (ref 8.9–12.9)
POTASSIUM SERPL-SCNC: 3.7 MMOL/L (ref 3.5–5.1)
PROT SERPL-MCNC: 7.7 G/DL (ref 6.4–8.2)
PROT UR STRIP-MCNC: NEGATIVE MG/DL
PROTHROMBIN TIME: 13.5 SEC (ref 11.9–14.7)
RBC # BLD AUTO: 4.06 M/UL (ref 3.8–5.2)
RBC #/AREA URNS HPF: NORMAL /HPF (ref 0–5)
SODIUM SERPL-SCNC: 139 MMOL/L (ref 136–145)
SP GR UR REFRACTOMETRY: 1.01 (ref 1–1.03)
THERAPEUTIC RANGE,PTTT: NORMAL SEC (ref 82–109)
UA: UC IF INDICATED,UAUC: NORMAL
UROBILINOGEN UR QL STRIP.AUTO: 0.1 EU/DL (ref 0.1–1)
WBC # BLD AUTO: 3.9 K/UL (ref 3.6–11)
WBC URNS QL MICRO: NORMAL /HPF (ref 0–4)

## 2022-01-13 PROCEDURE — 93010 ELECTROCARDIOGRAM REPORT: CPT | Performed by: INTERNAL MEDICINE

## 2022-01-13 PROCEDURE — 85730 THROMBOPLASTIN TIME PARTIAL: CPT

## 2022-01-13 PROCEDURE — C1769 GUIDE WIRE: HCPCS | Performed by: INTERNAL MEDICINE

## 2022-01-13 PROCEDURE — 93005 ELECTROCARDIOGRAM TRACING: CPT

## 2022-01-13 PROCEDURE — 82962 GLUCOSE BLOOD TEST: CPT

## 2022-01-13 PROCEDURE — 92928 PRQ TCAT PLMT NTRAC ST 1 LES: CPT | Performed by: INTERNAL MEDICINE

## 2022-01-13 PROCEDURE — 74011000250 HC RX REV CODE- 250: Performed by: INTERNAL MEDICINE

## 2022-01-13 PROCEDURE — 77030025703 HC SYR ANGI VACLOK MRTM -A: Performed by: INTERNAL MEDICINE

## 2022-01-13 PROCEDURE — 93458 L HRT ARTERY/VENTRICLE ANGIO: CPT | Performed by: INTERNAL MEDICINE

## 2022-01-13 PROCEDURE — C1894 INTRO/SHEATH, NON-LASER: HCPCS | Performed by: INTERNAL MEDICINE

## 2022-01-13 PROCEDURE — C1725 CATH, TRANSLUMIN NON-LASER: HCPCS | Performed by: INTERNAL MEDICINE

## 2022-01-13 PROCEDURE — G0378 HOSPITAL OBSERVATION PER HR: HCPCS

## 2022-01-13 PROCEDURE — 77030016700 HC CATH ANGI DX INFN2 CARD -B: Performed by: INTERNAL MEDICINE

## 2022-01-13 PROCEDURE — 80053 COMPREHEN METABOLIC PANEL: CPT

## 2022-01-13 PROCEDURE — 81001 URINALYSIS AUTO W/SCOPE: CPT

## 2022-01-13 PROCEDURE — 85027 COMPLETE CBC AUTOMATED: CPT

## 2022-01-13 PROCEDURE — 99153 MOD SED SAME PHYS/QHP EA: CPT | Performed by: INTERNAL MEDICINE

## 2022-01-13 PROCEDURE — 77030013516 HC DEV INFL ANGI MRTM -B: Performed by: INTERNAL MEDICINE

## 2022-01-13 PROCEDURE — 99152 MOD SED SAME PHYS/QHP 5/>YRS: CPT | Performed by: INTERNAL MEDICINE

## 2022-01-13 PROCEDURE — 2709999900 HC NON-CHARGEABLE SUPPLY: Performed by: INTERNAL MEDICINE

## 2022-01-13 PROCEDURE — 74011000258 HC RX REV CODE- 258: Performed by: INTERNAL MEDICINE

## 2022-01-13 PROCEDURE — 74011000636 HC RX REV CODE- 636: Performed by: INTERNAL MEDICINE

## 2022-01-13 PROCEDURE — 74011250636 HC RX REV CODE- 250/636: Performed by: INTERNAL MEDICINE

## 2022-01-13 PROCEDURE — C1874 STENT, COATED/COV W/DEL SYS: HCPCS | Performed by: INTERNAL MEDICINE

## 2022-01-13 PROCEDURE — 77030008542 HC TBNG MON PRSS EDWD -A: Performed by: INTERNAL MEDICINE

## 2022-01-13 PROCEDURE — 74011250637 HC RX REV CODE- 250/637: Performed by: INTERNAL MEDICINE

## 2022-01-13 PROCEDURE — 76210000000 HC OR PH I REC 2 TO 2.5 HR: Performed by: INTERNAL MEDICINE

## 2022-01-13 PROCEDURE — 77030003394 HC NDL ART COOK -A: Performed by: INTERNAL MEDICINE

## 2022-01-13 PROCEDURE — 85610 PROTHROMBIN TIME: CPT

## 2022-01-13 PROCEDURE — C1887 CATHETER, GUIDING: HCPCS | Performed by: INTERNAL MEDICINE

## 2022-01-13 DEVICE — STENT COR DES 2.50X12MM -- DES RESOLUTE ONYX: Type: IMPLANTABLE DEVICE | Status: FUNCTIONAL

## 2022-01-13 RX ORDER — LIDOCAINE HYDROCHLORIDE 10 MG/ML
INJECTION INFILTRATION; PERINEURAL AS NEEDED
Status: DISCONTINUED | OUTPATIENT
Start: 2022-01-13 | End: 2022-01-13 | Stop reason: HOSPADM

## 2022-01-13 RX ORDER — HEPARIN SODIUM 200 [USP'U]/100ML
INJECTION, SOLUTION INTRAVENOUS
Status: COMPLETED | OUTPATIENT
Start: 2022-01-13 | End: 2022-01-13

## 2022-01-13 RX ORDER — ATORVASTATIN CALCIUM 40 MG/1
80 TABLET, FILM COATED ORAL DAILY
Status: DISCONTINUED | OUTPATIENT
Start: 2022-01-14 | End: 2022-01-14 | Stop reason: HOSPADM

## 2022-01-13 RX ORDER — HEPARIN SODIUM 1000 [USP'U]/ML
INJECTION, SOLUTION INTRAVENOUS; SUBCUTANEOUS AS NEEDED
Status: DISCONTINUED | OUTPATIENT
Start: 2022-01-13 | End: 2022-01-13 | Stop reason: HOSPADM

## 2022-01-13 RX ORDER — PANTOPRAZOLE SODIUM 40 MG/1
40 TABLET, DELAYED RELEASE ORAL 2 TIMES DAILY
Status: DISCONTINUED | OUTPATIENT
Start: 2022-01-13 | End: 2022-01-14 | Stop reason: HOSPADM

## 2022-01-13 RX ORDER — NITROGLYCERIN 5 MG/ML
INJECTION, SOLUTION INTRAVENOUS AS NEEDED
Status: DISCONTINUED | OUTPATIENT
Start: 2022-01-13 | End: 2022-01-13 | Stop reason: HOSPADM

## 2022-01-13 RX ORDER — MIDAZOLAM HYDROCHLORIDE 1 MG/ML
INJECTION INTRAMUSCULAR; INTRAVENOUS AS NEEDED
Status: DISCONTINUED | OUTPATIENT
Start: 2022-01-13 | End: 2022-01-13 | Stop reason: HOSPADM

## 2022-01-13 RX ORDER — LOSARTAN POTASSIUM 25 MG/1
25 TABLET ORAL DAILY
Status: DISCONTINUED | OUTPATIENT
Start: 2022-01-14 | End: 2022-01-14 | Stop reason: HOSPADM

## 2022-01-13 RX ORDER — SODIUM CHLORIDE 9 MG/ML
75 INJECTION, SOLUTION INTRAVENOUS CONTINUOUS
Status: DISCONTINUED | OUTPATIENT
Start: 2022-01-13 | End: 2022-01-14 | Stop reason: HOSPADM

## 2022-01-13 RX ORDER — CLOPIDOGREL BISULFATE 75 MG/1
75 TABLET ORAL DAILY
Status: DISCONTINUED | OUTPATIENT
Start: 2022-01-14 | End: 2022-01-14 | Stop reason: HOSPADM

## 2022-01-13 RX ORDER — METOPROLOL SUCCINATE 25 MG/1
25 TABLET, EXTENDED RELEASE ORAL DAILY
Status: DISCONTINUED | OUTPATIENT
Start: 2022-01-14 | End: 2022-01-14 | Stop reason: HOSPADM

## 2022-01-13 RX ORDER — FENTANYL CITRATE 50 UG/ML
INJECTION, SOLUTION INTRAMUSCULAR; INTRAVENOUS AS NEEDED
Status: DISCONTINUED | OUTPATIENT
Start: 2022-01-13 | End: 2022-01-13 | Stop reason: HOSPADM

## 2022-01-13 RX ORDER — SODIUM CHLORIDE 0.9 % (FLUSH) 0.9 %
5-40 SYRINGE (ML) INJECTION AS NEEDED
Status: DISCONTINUED | OUTPATIENT
Start: 2022-01-13 | End: 2022-01-14 | Stop reason: HOSPADM

## 2022-01-13 RX ORDER — SODIUM CHLORIDE 0.9 % (FLUSH) 0.9 %
5-40 SYRINGE (ML) INJECTION EVERY 8 HOURS
Status: DISCONTINUED | OUTPATIENT
Start: 2022-01-13 | End: 2022-01-14 | Stop reason: HOSPADM

## 2022-01-13 RX ORDER — CLOPIDOGREL 300 MG/1
TABLET, FILM COATED ORAL AS NEEDED
Status: DISCONTINUED | OUTPATIENT
Start: 2022-01-13 | End: 2022-01-13 | Stop reason: HOSPADM

## 2022-01-13 RX ORDER — GUAIFENESIN 100 MG/5ML
81 LIQUID (ML) ORAL DAILY
Status: DISCONTINUED | OUTPATIENT
Start: 2022-01-14 | End: 2022-01-14 | Stop reason: HOSPADM

## 2022-01-13 RX ADMIN — SODIUM CHLORIDE 75 ML/HR: 9 INJECTION, SOLUTION INTRAVENOUS at 11:44

## 2022-01-13 RX ADMIN — SODIUM CHLORIDE, PRESERVATIVE FREE 10 ML: 5 INJECTION INTRAVENOUS at 20:20

## 2022-01-13 RX ADMIN — PANTOPRAZOLE SODIUM 40 MG: 40 TABLET, DELAYED RELEASE ORAL at 20:20

## 2022-01-13 NOTE — Clinical Note
Contrast Dose Calculator:   Patient's age: 78.   Patient's sex: Female. Patient weight (kg) = 70.3. Creatinine level (mg/dL) = 0.99. Creatinine clearance (mL/min): 61.   Contrast concentration (mg/mL) = 370. MACD = 287.88 mL. Max Contrast dose per Creatinine Cl calculator = 137.25 mL.

## 2022-01-14 VITALS
DIASTOLIC BLOOD PRESSURE: 64 MMHG | HEART RATE: 63 BPM | OXYGEN SATURATION: 99 % | SYSTOLIC BLOOD PRESSURE: 140 MMHG | RESPIRATION RATE: 20 BRPM | BODY MASS INDEX: 24.28 KG/M2 | TEMPERATURE: 98.2 F | WEIGHT: 155 LBS

## 2022-01-14 LAB
ATRIAL RATE: 64 BPM
CALCULATED P AXIS, ECG09: 48 DEGREES
CALCULATED T AXIS, ECG11: -8 DEGREES
DIAGNOSIS, 93000: NORMAL
GLUCOSE BLD STRIP.AUTO-MCNC: 81 MG/DL (ref 65–117)
P-R INTERVAL, ECG05: 174 MS
PERFORMED BY, TECHID: NORMAL
Q-T INTERVAL, ECG07: 428 MS
QRS DURATION, ECG06: 72 MS
QTC CALCULATION (BEZET), ECG08: 441 MS
VENTRICULAR RATE, ECG03: 64 BPM

## 2022-01-14 PROCEDURE — 74011250637 HC RX REV CODE- 250/637: Performed by: INTERNAL MEDICINE

## 2022-01-14 PROCEDURE — 82962 GLUCOSE BLOOD TEST: CPT

## 2022-01-14 PROCEDURE — G0378 HOSPITAL OBSERVATION PER HR: HCPCS

## 2022-01-14 PROCEDURE — 74011000250 HC RX REV CODE- 250: Performed by: INTERNAL MEDICINE

## 2022-01-14 RX ADMIN — SODIUM CHLORIDE, PRESERVATIVE FREE 10 ML: 5 INJECTION INTRAVENOUS at 05:38

## 2022-01-14 RX ADMIN — ATORVASTATIN CALCIUM 80 MG: 40 TABLET, FILM COATED ORAL at 08:44

## 2022-01-14 RX ADMIN — METOPROLOL SUCCINATE 25 MG: 25 TABLET, EXTENDED RELEASE ORAL at 08:44

## 2022-01-14 RX ADMIN — ASPIRIN 81 MG CHEWABLE TABLET 81 MG: 81 TABLET CHEWABLE at 08:44

## 2022-01-14 RX ADMIN — PANTOPRAZOLE SODIUM 40 MG: 40 TABLET, DELAYED RELEASE ORAL at 08:44

## 2022-01-14 RX ADMIN — CLOPIDOGREL BISULFATE 75 MG: 75 TABLET, FILM COATED ORAL at 08:44

## 2022-01-14 RX ADMIN — LOSARTAN POTASSIUM 25 MG: 25 TABLET, FILM COATED ORAL at 08:44

## 2022-01-14 NOTE — PROGRESS NOTES
Provided patient education about and explained the importance of medication compliance. Patient stated that she can afford the antiplatelet medication prescribed. Patient was advised that if the antiplatelet medication becomes unaffordable, she must notify the cardiologist immediately so that an alternate plan for antiplatelet therapy can be made. Patient stated that she has been taking clopidogrel and has some at home so that it will be available for the next scheduled dose after discharge. Patient asked appropriate questions and verbalized understanding during this consultation and was given printed teaching materials and my contact information in case I can provide further assistance. Spoke with patient about phase 2 outpatient cardiac rehab. Patient was given a choice of facilities to be enrolled and chose [facility]. Patients information was forwarded to this facility and patient will be contacted by this facility to schedule an initial appointment. Patient was made aware of this verbally and in writing. Patient verbalized understanding and was given printed teaching materials and my contact information in case she needs further assistance, and the address and phone number for the cardiac rehab facility to which she was referred.

## 2022-01-14 NOTE — DISCHARGE SUMMARY
Admit date: 1/13/2022   Admitting Provider: Blaire Gómez MD    Discharge date: 1/14/2022  Discharging Provider: Blaire Gómez MD      * Admission Diagnoses: [facility] ([facility] [I20.0]  Coronary artery disease involving native heart, unspecified vessel or lesion type, unspecified whether angina present [I25.10]  CAD (coronary artery disease) [I25.10]    * Discharge Diagnoses:    Hospital Problems as of 1/14/2022 Never Reviewed          Codes Class Noted - Resolved POA    Unstable angina ([facility] [address] ICD-10-CM: I20.0  ICD-9-CM: 411.1  1/13/2022 - Present Unknown        CAD (coronary artery disease) ICD-10-CM: I25.10  ICD-9-CM: 414.00  1/13/2022 - Present Unknown              * Hospital Course:     Patient cardiac catheterization and PCI of left extremity. Procedure was then made left radial artery. She was monitored overnight. She received 2.5 x 12 mm drug-eluting stent postdilated with a 2.75 compliant balloon. RCA stent was widely patent. Ejection fraction was normal.  Blood pressure was stable. Left radial pulses +2 without hematoma. Rhythm was stable. She was felt to be stable for discharge on 1/14/2022. No change in her medication. I will see her in 1 to 2 weeks after discharge or sooner if needed. * Procedures:   Procedure(s):  LEFT HEART CATH / CORONARY ANGIOGRAPHY  PERCUTANEOUS CORONARY INTERVENTION      Consults: Rehabilitation Medicine    Significant Diagnostic Studies: labs:     Discharge Exam:  Visit Vitals  BP (!) 144/73 (BP 1 Location: Right upper arm, BP Patient Position: At rest)   Pulse 69   Temp 98.3 °F (36.8 °C)   Resp 20   Wt 70.3 kg (155 lb)   SpO2 97%   BMI 24.28 kg/m²     General:  Alert, cooperative, no distress, appears stated age. Head:  Normocephalic, without obvious abnormality, atraumatic. Eyes:  Conjunctivae/corneas clear. PERRL, EOMs intact. Fundi benign. Ears:  Normal TMs and external ear canals both ears. Nose: Nares normal. Septum midline.  Mucosa normal. No drainage or sinus tenderness. Throat: Lips, mucosa, and tongue normal. Teeth and gums normal.   Neck: Supple, symmetrical, trachea midline, no adenopathy, thyroid: no enlargement/tenderness/nodules, no carotid bruit and no JVD. Back:   Symmetric, no curvature. ROM normal. No CVA tenderness. Lungs:   Clear to auscultation bilaterally. Chest wall:  No tenderness or deformity. Heart:  Regular rate and rhythm, S1, S2 normal, no murmur, click, rub or gallop. Breast Exam:  No tenderness, masses, or nipple abnormality. Abdomen:   Soft, non-tender. Bowel sounds normal. No masses,  No organomegaly. Genitalia:  Normal female without lesion, discharge or tenderness. Rectal:  Normal tone,  no masses or tenderness  Guaiac negative stool. Extremities: Extremities normal, atraumatic, no cyanosis or edema. Pulses: 2+ and symmetric all extremities. Skin: Skin color, texture, turgor normal. No rashes or lesions. Lymph nodes: Cervical, supraclavicular, and axillary nodes normal.   Neurologic: CNII-XII intact. Normal strength, sensation and reflexes throughout. * Discharge Condition: good  * Disposition: Home    Discharge Medications:  Current Discharge Medication List      CONTINUE these medications which have NOT CHANGED    Details   glimepiride (AMARYL) 4 mg tablet Take 4 mg by mouth every morning. ferrous sulfate 325 mg (65 mg iron) tablet Take  by mouth Daily (before breakfast). clopidogreL (PLAVIX) 75 mg tab Take  by mouth.      pantoprazole (Protonix) 40 mg tablet Take 1 Tablet by mouth two (2) times a day. Qty: 60 Tablet, Refills: 1      pioglitazone (ACTOS) 30 mg tablet       aspirin 81 mg chewable tablet Take 1 Tablet by mouth daily. Qty: 30 Tablet, Refills: 0      atorvastatin (LIPITOR) 80 mg tablet Take 1 Tablet by mouth daily. Qty: 30 Tablet, Refills: 0      losartan (COZAAR) 25 mg tablet Take 1 Tablet by mouth daily.   Qty: 30 Tablet, Refills: 0      metoprolol succinate (TOPROL-XL) 25 mg XL tablet Take 1 Tablet by mouth daily. Qty: 30 Tablet, Refills: 0      diphenhydrAMINE (BENADRYL) 2 % topical cream Apply  to affected area three (3) times daily as needed for Skin Irritation or Itching. Qty: 30 g, Refills: 0         STOP taking these medications       fluocinolone acetonide oiL 0.01 % drop Comments:   Reason for Stopping:         spironolactone-hydrochlorothiazide (ALDACTAZIDE) 25-25 mg per tablet Comments:   Reason for Stopping:         prasugreL (EFFIENT) 10 mg tablet Comments:   Reason for Stopping:               * Follow-up Care/Patient Instructions:   Activity: Activity as tolerated and no driving for today  Diet: Cardiac Diet  Wound Care: Keep wound clean and dry    Follow-up Information     Follow up With Specialties Details Why Contact Info    Radha Yuen MD Internal Medicine   [facility]  [phone]            Signed:  Dominique Soto MD  1/14/2022  7:53 AM

## 2022-01-16 LAB
GLUCOSE BLD STRIP.AUTO-MCNC: 59 MG/DL (ref 65–117)
PERFORMED BY, TECHID: ABNORMAL

## 2022-01-25 ENCOUNTER — HOSPITAL ENCOUNTER (OUTPATIENT)
Dept: CARDIAC REHAB | Age: 67
Discharge: HOME | End: 2022-01-25
Payer: MEDICARE

## 2022-01-25 VITALS — HEIGHT: 67 IN | WEIGHT: 154 LBS | BODY MASS INDEX: 24.17 KG/M2

## 2022-01-25 PROCEDURE — 93798 PHYS/QHP OP CAR RHAB W/ECG: CPT

## 2022-01-25 PROCEDURE — 93797 PHYS/QHP OP CAR RHAB WO ECG: CPT

## 2022-01-25 RX ORDER — GLIPIZIDE 5 MG/1
2.5 TABLET ORAL 2 TIMES DAILY
COMMUNITY

## 2022-01-25 RX ORDER — FAMOTIDINE 40 MG/1
20 TABLET, FILM COATED ORAL
COMMUNITY

## 2022-01-25 NOTE — CARDIO/PULMONARY
Kylie Antoine   78 y.o.    presented to cardiac rehab for orientation and exercise tolerance test today with a primary diagnosis of PTCA w/ stent  . Nicho Banegas has a history of MI, DM, stent, CAD, [place], HTN, GI bleed. Cardiac risk factors include MI, HTN, previous stent, DM. Otilia Banegas is  and lives with her  Sandip Rao and is the primary caregiver. social hx. PHQ9, depression score, is 5 and this is considered mild. The result was discussed with patient who affirms score to be accurate and  * (document action take). Patient denied chest pain or SOB during 6 minute walk test and was in NSR w/PVC's . Exercise prescription developed around patient stated goals, to be supplemented with home exercise recommendations. EF is 50-60%. Education manual given to patient and reviewed.  Patient will attend cardiac rehab 2-3 times/week and education

## 2022-01-26 ENCOUNTER — APPOINTMENT (OUTPATIENT)
Dept: CARDIAC REHAB | Age: 67
End: 2022-01-26
Payer: MEDICARE

## 2022-01-27 ENCOUNTER — HOSPITAL ENCOUNTER (OUTPATIENT)
Dept: CARDIAC REHAB | Age: 67
Discharge: HOME | End: 2022-01-27
Payer: MEDICARE

## 2022-01-27 VITALS — BODY MASS INDEX: 24.18 KG/M2 | WEIGHT: 154.4 LBS

## 2022-01-27 PROCEDURE — 93798 PHYS/QHP OP CAR RHAB W/ECG: CPT

## 2022-01-31 ENCOUNTER — HOSPITAL ENCOUNTER (OUTPATIENT)
Dept: CARDIAC REHAB | Age: 67
Discharge: HOME | End: 2022-01-31
Payer: MEDICARE

## 2022-01-31 VITALS — BODY MASS INDEX: 24.53 KG/M2 | WEIGHT: 156.6 LBS

## 2022-01-31 PROCEDURE — 93798 PHYS/QHP OP CAR RHAB W/ECG: CPT

## 2022-02-01 ENCOUNTER — APPOINTMENT (OUTPATIENT)
Dept: GENERAL RADIOLOGY | Age: 67
End: 2022-02-01
Attending: EMERGENCY MEDICINE
Payer: MEDICARE

## 2022-02-01 ENCOUNTER — HOSPITAL ENCOUNTER (EMERGENCY)
Age: 67
Discharge: HOME | End: 2022-02-01
Attending: EMERGENCY MEDICINE
Payer: MEDICARE

## 2022-02-01 VITALS
HEART RATE: 71 BPM | TEMPERATURE: 98.5 F | WEIGHT: 156 LBS | DIASTOLIC BLOOD PRESSURE: 81 MMHG | BODY MASS INDEX: 24.48 KG/M2 | HEIGHT: 67 IN | OXYGEN SATURATION: 100 % | SYSTOLIC BLOOD PRESSURE: 177 MMHG | RESPIRATION RATE: 18 BRPM

## 2022-02-01 DIAGNOSIS — J01.90 ACUTE NON-RECURRENT SINUSITIS, UNSPECIFIED LOCATION: Primary | ICD-10-CM

## 2022-02-01 PROCEDURE — 71045 X-RAY EXAM CHEST 1 VIEW: CPT

## 2022-02-01 PROCEDURE — 99284 EMERGENCY DEPT VISIT MOD MDM: CPT

## 2022-02-01 RX ORDER — AZITHROMYCIN 250 MG/1
TABLET, FILM COATED ORAL
Qty: 6 TABLET | Refills: 0 | Status: SHIPPED | OUTPATIENT
Start: 2022-02-01 | End: 2022-02-06

## 2022-02-01 NOTE — ED PROVIDER NOTES
HPI 30-year-old female who is 2 and half weeks status post PCI with 2 stents placement presents ED with complaints of cough productive of small amount of bright red blood. She denies fever chills sensation of illness. She has noted significant ENT drainage and irritation. No nosebleed no sinus pain or tenderness.   No prior history of hemoptysis; non-smoker    Past Medical History:   Diagnosis Date    Diabetes ([address]     History of heart artery stent     Hypertension     Ill-defined condition     GI bleed    Menopause     Myocardial infarction [facility])     May 2021    STEMI (ST elevation myocardial infarction) [facility])     Jan 13, 2022       Past Surgical History:   Procedure Laterality Date    HX GYN      vaginal deliveries x2    HX HEART CATHETERIZATION      2 stents (may 2021, jan 2022)    HX HYSTERECTOMY      partial    HX OTHER SURGICAL      egd- gastric polyp    CO ABDOMEN SURGERY PROC UNLISTED           Family History:   Problem Relation Age of Onset    Kidney Disease Mother     Alcohol abuse Father     Cancer Sister     Hypertension Sister     Diabetes Sister     Dementia Brother     Diabetes Sister     Diabetes Sister     Diabetes Sister     Diabetes Sister     No Known Problems Sister     Cancer Brother     Heart Disease Brother     Diabetes Brother        Social History     Socioeconomic History    Marital status:      Spouse name: Disha Leong Number of children: 11    Years of education: Not on file    Highest education level: High school graduate   Occupational History    Occupation: Retired     Employer: RETIRED   Tobacco Use    Smoking status: Never Smoker    Smokeless tobacco: Never Used   Substance and Sexual Activity    Alcohol use: Not Currently    Drug use: Never    Sexual activity: Not on file   Other Topics Concern     Service No    Blood Transfusions Yes    Caffeine Concern No    Occupational Exposure No    Hobby Hazards No    Sleep Concern Yes Comment: most of the time sleep well    Stress Concern Yes    Weight Concern No    Special Diet No    Back Care Yes     Comment: fell on ice 8yrs ago    Exercise No    Bike Helmet No    Seat Belt Yes    Self-Exams No   Social History Narrative    Not on file     Social Determinants of Health     Financial Resource Strain:     Difficulty of Paying Living Expenses: Not on file   Food Insecurity:     Worried About Running Out of Food in the Last Year: Not on file    Chauncey of Food in the Last Year: Not on file   Transportation Needs:     Lack of Transportation (Medical): Not on file    Lack of Transportation (Non-Medical): Not on file   Physical Activity:     Days of Exercise per Week: Not on file    Minutes of Exercise per Session: Not on file   Stress:     Feeling of Stress : Not on file   Social Connections:     Frequency of Communication with Friends and Family: Not on file    Frequency of Social Gatherings with Friends and Family: Not on file    Attends Latter-day Services: Not on file    Active Member of [address] [place] [address] or Organizations: Not on file    Attends Club or Organization Meetings: Not on file    Marital Status: Not on file   Intimate Partner Violence:     Fear of Current or Ex-Partner: Not on file    Emotionally Abused: Not on file    Physically Abused: Not on file    Sexually Abused: Not on file   Housing Stability:     Unable to Pay for Housing in the Last Year: Not on file    Number of [place] in the Last Year: Not on file    Unstable Housing in the Last Year: Not on file         ALLERGIES: Patient has no known allergies. Review of Systems   All other systems reviewed and are negative. Vitals:    02/01/22 1144   BP: (!) 183/87   Pulse: 78   Resp: 18   Temp: 98.5 °F (36.9 °C)   SpO2: 100%   Weight: 70.8 kg (156 lb)   Height: 5' 7\" (1.702 m)          Pleasant AA female no acute distress  Physical Exam  Vitals and nursing note reviewed.    Constitutional: General: She is not in acute distress. Appearance: Normal appearance. She is not ill-appearing, toxic-appearing or diaphoretic. HENT:      Head: Normocephalic and atraumatic. Nose: Nose normal.      Comments: No bleeding     Mouth/Throat:      Mouth: Mucous membranes are moist.      Pharynx: No oropharyngeal exudate or posterior oropharyngeal erythema. Comments: Normal oropharynx  Eyes:      Extraocular Movements: Extraocular movements intact. Conjunctiva/sclera: Conjunctivae normal.      Pupils: Pupils are equal, round, and reactive to light. Cardiovascular:      Rate and Rhythm: Normal rate and regular rhythm. Pulses: Normal pulses. Heart sounds: Normal heart sounds. No murmur heard. Pulmonary:      Effort: Pulmonary effort is normal. No respiratory distress. Breath sounds: Normal breath sounds. No wheezing, rhonchi or rales. Abdominal:      General: Bowel sounds are normal. There is no distension. Palpations: Abdomen is soft. There is no mass. Tenderness: There is no abdominal tenderness. There is no right CVA tenderness, left CVA tenderness, guarding or rebound. Hernia: No hernia is present. Musculoskeletal:         General: No swelling, tenderness, deformity or signs of injury. Normal range of motion. Cervical back: Normal range of motion and neck supple. No rigidity or tenderness. Right lower leg: No edema. Left lower leg: No edema. Lymphadenopathy:      Cervical: No cervical adenopathy. Skin:     General: Skin is warm and dry. Capillary Refill: Capillary refill takes less than 2 seconds. Findings: No erythema, lesion or rash. Neurological:      General: No focal deficit present. Mental Status: She is alert and oriented to person, place, and time. Mental status is at baseline. Cranial Nerves: No cranial nerve deficit. Sensory: No sensory deficit.    Psychiatric:         Mood and Affect: Mood normal. Behavior: Behavior normal.         Thought Content:  Thought content normal.          MDM chest x-ray is clear; clinically suspect upper source as patient is experiencing sinusitis symptoms recently will give 5-day course of azithromycin return if symptoms worsen       Procedures

## 2022-02-02 ENCOUNTER — HOSPITAL ENCOUNTER (OUTPATIENT)
Dept: CARDIAC REHAB | Age: 67
Discharge: HOME | End: 2022-02-02
Payer: MEDICARE

## 2022-02-02 VITALS — WEIGHT: 154 LBS | BODY MASS INDEX: 24.12 KG/M2

## 2022-02-02 PROCEDURE — 93797 PHYS/QHP OP CAR RHAB WO ECG: CPT

## 2022-02-02 PROCEDURE — 93798 PHYS/QHP OP CAR RHAB W/ECG: CPT

## 2022-02-03 ENCOUNTER — HOSPITAL ENCOUNTER (OUTPATIENT)
Dept: CARDIAC REHAB | Age: 67
Discharge: HOME | End: 2022-02-03
Payer: MEDICARE

## 2022-02-03 VITALS — BODY MASS INDEX: 23.96 KG/M2 | WEIGHT: 153 LBS

## 2022-02-03 PROCEDURE — 93798 PHYS/QHP OP CAR RHAB W/ECG: CPT

## 2022-02-07 ENCOUNTER — HOSPITAL ENCOUNTER (OUTPATIENT)
Dept: CARDIAC REHAB | Age: 67
Discharge: HOME | End: 2022-02-07
Payer: MEDICARE

## 2022-02-07 VITALS — WEIGHT: 156 LBS | BODY MASS INDEX: 24.43 KG/M2

## 2022-02-07 PROCEDURE — 93798 PHYS/QHP OP CAR RHAB W/ECG: CPT

## 2022-02-09 ENCOUNTER — HOSPITAL ENCOUNTER (OUTPATIENT)
Dept: CARDIAC REHAB | Age: 67
Discharge: HOME | End: 2022-02-09
Payer: MEDICARE

## 2022-02-09 VITALS — BODY MASS INDEX: 24.37 KG/M2 | WEIGHT: 155.6 LBS

## 2022-02-09 PROCEDURE — 93797 PHYS/QHP OP CAR RHAB WO ECG: CPT

## 2022-02-09 PROCEDURE — 93798 PHYS/QHP OP CAR RHAB W/ECG: CPT

## 2022-02-10 ENCOUNTER — HOSPITAL ENCOUNTER (OUTPATIENT)
Dept: CARDIAC REHAB | Age: 67
Discharge: HOME | End: 2022-02-10
Payer: MEDICARE

## 2022-02-10 VITALS — WEIGHT: 156 LBS | BODY MASS INDEX: 24.43 KG/M2

## 2022-02-10 PROCEDURE — 93798 PHYS/QHP OP CAR RHAB W/ECG: CPT

## 2022-02-14 ENCOUNTER — HOSPITAL ENCOUNTER (OUTPATIENT)
Dept: CARDIAC REHAB | Age: 67
Discharge: HOME | End: 2022-02-14
Payer: MEDICARE

## 2022-02-14 VITALS — WEIGHT: 159 LBS | BODY MASS INDEX: 24.9 KG/M2

## 2022-02-14 PROCEDURE — 93798 PHYS/QHP OP CAR RHAB W/ECG: CPT

## 2022-02-16 ENCOUNTER — HOSPITAL ENCOUNTER (OUTPATIENT)
Dept: CARDIAC REHAB | Age: 67
Discharge: HOME | End: 2022-02-16
Payer: MEDICARE

## 2022-02-16 VITALS — WEIGHT: 156.8 LBS | BODY MASS INDEX: 24.56 KG/M2

## 2022-02-16 PROCEDURE — 93797 PHYS/QHP OP CAR RHAB WO ECG: CPT

## 2022-02-16 PROCEDURE — 93798 PHYS/QHP OP CAR RHAB W/ECG: CPT

## 2022-02-17 ENCOUNTER — HOSPITAL ENCOUNTER (OUTPATIENT)
Dept: CARDIAC REHAB | Age: 67
Discharge: HOME | End: 2022-02-17
Payer: MEDICARE

## 2022-02-17 VITALS — WEIGHT: 159.4 LBS | BODY MASS INDEX: 24.97 KG/M2

## 2022-02-17 PROCEDURE — 93798 PHYS/QHP OP CAR RHAB W/ECG: CPT

## 2022-02-21 ENCOUNTER — HOSPITAL ENCOUNTER (OUTPATIENT)
Dept: CARDIAC REHAB | Age: 67
Discharge: HOME | End: 2022-02-21
Payer: MEDICARE

## 2022-02-21 VITALS — BODY MASS INDEX: 24.59 KG/M2 | WEIGHT: 157 LBS

## 2022-02-21 PROCEDURE — 93798 PHYS/QHP OP CAR RHAB W/ECG: CPT

## 2022-02-23 ENCOUNTER — HOSPITAL ENCOUNTER (OUTPATIENT)
Dept: CARDIAC REHAB | Age: 67
Discharge: HOME | End: 2022-02-23
Payer: MEDICARE

## 2022-02-23 VITALS — WEIGHT: 157 LBS | BODY MASS INDEX: 24.59 KG/M2

## 2022-02-23 PROCEDURE — 93797 PHYS/QHP OP CAR RHAB WO ECG: CPT

## 2022-02-23 PROCEDURE — 93798 PHYS/QHP OP CAR RHAB W/ECG: CPT

## 2022-02-24 ENCOUNTER — HOSPITAL ENCOUNTER (OUTPATIENT)
Dept: CARDIAC REHAB | Age: 67
Discharge: HOME | End: 2022-02-24
Payer: MEDICARE

## 2022-02-24 VITALS — WEIGHT: 157.4 LBS | BODY MASS INDEX: 24.65 KG/M2

## 2022-02-24 PROCEDURE — 93798 PHYS/QHP OP CAR RHAB W/ECG: CPT

## 2022-02-28 ENCOUNTER — HOSPITAL ENCOUNTER (OUTPATIENT)
Dept: CARDIAC REHAB | Age: 67
Discharge: HOME | End: 2022-02-28
Payer: MEDICARE

## 2022-02-28 VITALS — BODY MASS INDEX: 24.71 KG/M2 | WEIGHT: 157.8 LBS

## 2022-02-28 PROCEDURE — 93798 PHYS/QHP OP CAR RHAB W/ECG: CPT

## 2022-03-02 ENCOUNTER — HOSPITAL ENCOUNTER (OUTPATIENT)
Dept: CARDIAC REHAB | Age: 67
Discharge: HOME | End: 2022-03-02
Payer: MEDICARE

## 2022-03-02 ENCOUNTER — HOSPITAL ENCOUNTER (OUTPATIENT)
Dept: CARDIAC REHAB | Age: 67
End: 2022-03-02
Payer: MEDICARE

## 2022-03-02 VITALS — BODY MASS INDEX: 24.81 KG/M2 | WEIGHT: 158.4 LBS

## 2022-03-02 PROCEDURE — 93798 PHYS/QHP OP CAR RHAB W/ECG: CPT

## 2022-03-03 ENCOUNTER — HOSPITAL ENCOUNTER (OUTPATIENT)
Dept: CARDIAC REHAB | Age: 67
Discharge: HOME | End: 2022-03-03
Payer: MEDICARE

## 2022-03-03 VITALS — BODY MASS INDEX: 24.81 KG/M2 | WEIGHT: 158.4 LBS

## 2022-03-03 PROCEDURE — 93798 PHYS/QHP OP CAR RHAB W/ECG: CPT

## 2022-03-07 ENCOUNTER — HOSPITAL ENCOUNTER (OUTPATIENT)
Dept: CARDIAC REHAB | Age: 67
Discharge: HOME | End: 2022-03-07
Payer: MEDICARE

## 2022-03-07 VITALS — BODY MASS INDEX: 23.71 KG/M2 | WEIGHT: 151.4 LBS

## 2022-03-07 PROCEDURE — 93798 PHYS/QHP OP CAR RHAB W/ECG: CPT

## 2022-03-09 ENCOUNTER — HOSPITAL ENCOUNTER (OUTPATIENT)
Dept: CARDIAC REHAB | Age: 67
Discharge: HOME | End: 2022-03-09
Payer: MEDICARE

## 2022-03-09 VITALS — BODY MASS INDEX: 23.65 KG/M2 | WEIGHT: 151 LBS

## 2022-03-09 PROCEDURE — 93797 PHYS/QHP OP CAR RHAB WO ECG: CPT

## 2022-03-09 PROCEDURE — 93798 PHYS/QHP OP CAR RHAB W/ECG: CPT

## 2022-03-10 ENCOUNTER — HOSPITAL ENCOUNTER (OUTPATIENT)
Dept: CARDIAC REHAB | Age: 67
Discharge: HOME | End: 2022-03-10
Payer: MEDICARE

## 2022-03-10 VITALS — WEIGHT: 159.6 LBS | BODY MASS INDEX: 25 KG/M2

## 2022-03-10 PROCEDURE — 93798 PHYS/QHP OP CAR RHAB W/ECG: CPT

## 2022-03-14 ENCOUNTER — HOSPITAL ENCOUNTER (OUTPATIENT)
Dept: CARDIAC REHAB | Age: 67
Discharge: HOME | End: 2022-03-14
Payer: MEDICARE

## 2022-03-14 VITALS — WEIGHT: 158 LBS | BODY MASS INDEX: 24.75 KG/M2

## 2022-03-14 PROCEDURE — 93798 PHYS/QHP OP CAR RHAB W/ECG: CPT

## 2022-03-16 ENCOUNTER — HOSPITAL ENCOUNTER (OUTPATIENT)
Dept: CARDIAC REHAB | Age: 67
Discharge: HOME | End: 2022-03-16
Payer: MEDICARE

## 2022-03-16 VITALS — BODY MASS INDEX: 24.9 KG/M2 | WEIGHT: 159 LBS

## 2022-03-16 PROCEDURE — 93797 PHYS/QHP OP CAR RHAB WO ECG: CPT

## 2022-03-16 PROCEDURE — 93798 PHYS/QHP OP CAR RHAB W/ECG: CPT

## 2022-03-17 ENCOUNTER — HOSPITAL ENCOUNTER (OUTPATIENT)
Dept: CARDIAC REHAB | Age: 67
Discharge: HOME | End: 2022-03-17
Payer: MEDICARE

## 2022-03-17 VITALS — BODY MASS INDEX: 24.9 KG/M2 | WEIGHT: 159 LBS

## 2022-03-17 PROCEDURE — 93798 PHYS/QHP OP CAR RHAB W/ECG: CPT

## 2022-03-19 PROBLEM — I21.19 STEMI INVOLVING OTH CORONARY ARTERY OF INFERIOR WALL (HCC): Status: ACTIVE | Noted: 2021-05-18

## 2022-03-19 PROBLEM — I25.10 CAD (CORONARY ARTERY DISEASE): Status: ACTIVE | Noted: 2022-01-13

## 2022-03-19 PROBLEM — I20.0 UNSTABLE ANGINA (HCC): Status: ACTIVE | Noted: 2022-01-13

## 2022-03-19 PROBLEM — K92.2 UPPER GI BLEED: Status: ACTIVE | Noted: 2021-06-01

## 2022-03-21 ENCOUNTER — APPOINTMENT (OUTPATIENT)
Dept: CARDIAC REHAB | Age: 67
End: 2022-03-21
Payer: MEDICARE

## 2022-03-23 ENCOUNTER — APPOINTMENT (OUTPATIENT)
Dept: CARDIAC REHAB | Age: 67
End: 2022-03-23
Payer: MEDICARE

## 2022-03-24 ENCOUNTER — APPOINTMENT (OUTPATIENT)
Dept: CARDIAC REHAB | Age: 67
End: 2022-03-24
Payer: MEDICARE

## 2022-03-28 ENCOUNTER — APPOINTMENT (OUTPATIENT)
Dept: CARDIAC REHAB | Age: 67
End: 2022-03-28
Payer: MEDICARE

## 2022-03-30 ENCOUNTER — APPOINTMENT (OUTPATIENT)
Dept: CARDIAC REHAB | Age: 67
End: 2022-03-30
Payer: MEDICARE

## 2022-04-04 ENCOUNTER — TRANSCRIBE ORDER (OUTPATIENT)
Dept: SCHEDULING | Age: 67
End: 2022-04-04

## 2022-04-04 ENCOUNTER — HOSPITAL ENCOUNTER (OUTPATIENT)
Dept: CARDIAC REHAB | Age: 67
Discharge: HOME | End: 2022-04-04
Payer: MEDICARE

## 2022-04-04 VITALS — WEIGHT: 162 LBS | BODY MASS INDEX: 25.37 KG/M2

## 2022-04-04 DIAGNOSIS — Z12.31 VISIT FOR SCREENING MAMMOGRAM: Primary | ICD-10-CM

## 2022-04-04 PROCEDURE — 93798 PHYS/QHP OP CAR RHAB W/ECG: CPT

## 2022-04-06 ENCOUNTER — HOSPITAL ENCOUNTER (OUTPATIENT)
Dept: CARDIAC REHAB | Age: 67
Discharge: HOME | End: 2022-04-06
Payer: MEDICARE

## 2022-04-06 VITALS — BODY MASS INDEX: 25.34 KG/M2 | WEIGHT: 161.8 LBS

## 2022-04-06 PROCEDURE — 93798 PHYS/QHP OP CAR RHAB W/ECG: CPT

## 2022-04-06 PROCEDURE — 93797 PHYS/QHP OP CAR RHAB WO ECG: CPT

## 2022-04-07 ENCOUNTER — HOSPITAL ENCOUNTER (OUTPATIENT)
Dept: CARDIAC REHAB | Age: 67
Discharge: HOME | End: 2022-04-07
Payer: MEDICARE

## 2022-04-07 ENCOUNTER — HOSPITAL ENCOUNTER (OUTPATIENT)
Dept: MAMMOGRAPHY | Age: 67
Discharge: HOME | End: 2022-04-07
Attending: INTERNAL MEDICINE
Payer: MEDICARE

## 2022-04-07 VITALS — BODY MASS INDEX: 25.18 KG/M2 | WEIGHT: 160.8 LBS

## 2022-04-07 DIAGNOSIS — Z12.31 VISIT FOR SCREENING MAMMOGRAM: ICD-10-CM

## 2022-04-07 PROCEDURE — 77063 BREAST TOMOSYNTHESIS BI: CPT

## 2022-04-07 PROCEDURE — 93798 PHYS/QHP OP CAR RHAB W/ECG: CPT

## 2022-04-11 ENCOUNTER — HOSPITAL ENCOUNTER (OUTPATIENT)
Dept: CARDIAC REHAB | Age: 67
Discharge: HOME | End: 2022-04-11
Payer: MEDICARE

## 2022-04-11 VITALS — BODY MASS INDEX: 25.06 KG/M2 | WEIGHT: 160 LBS

## 2022-04-11 PROCEDURE — 93798 PHYS/QHP OP CAR RHAB W/ECG: CPT

## 2022-04-11 PROCEDURE — 93797 PHYS/QHP OP CAR RHAB WO ECG: CPT

## 2022-04-11 NOTE — CARDIO/PULMONARY
Maryanne Levine  Completed phase II cardiac rehab and attended 36 sessions from 1/25/22. Jessica Masters is interested in maintaining optimal health and will work with Dr. Dago Silva and Dr. Davon Mesa MD. Jessica Masters has improved her endurance and stamina through regular exercise, waist measurement remained unchanged. Blood pressure is 156/86 and  is not WNL. She has improved his/her nutrition, [place] and depression scores and these were reviewed with patient. Maryanne Levine plans to continue exercising at home and has set revised goals that include walking at home 7 days a week as well as resistance bands. She plans to purchase a treadmill for her home so that weather will not be a factor.   Cindy Abrams RN  4/11/2022

## 2022-04-11 NOTE — CARDIO/PULMONARY
Cardiac Rehab Nutrition Assessment - 1:1 Evaluation       NAME: Lucía Seals : 1955 AGE: 75 y.o. GENDER: female  CARDIAC REHAB ADMITTING DIAGNOSIS: PTCA w/ stent (x2)    *Hearing impaired    PROBLEM LIST:  Patient Active Problem List   Diagnosis Code    STEMI involving oth coronary artery of inferior wall ([facility]) I21.19    Upper GI bleed K92.2    Unstable angina (HCC) I20.0    CAD (coronary artery disease) I25.10       LABS:   Lab Results   Component Value Date/Time    Hemoglobin A1c 7.6 (H) 2021 03:38 PM     Lab Results   Component Value Date/Time    Cholesterol, total 187 2021 04:15 AM    HDL Cholesterol 69 2021 04:15 AM    LDL, calculated 105.6 (H) 2021 04:15 AM    VLDL, calculated 12.4 2021 04:15 AM    Triglyceride 62 2021 04:15 AM    CHOL/HDL Ratio 2.7 2021 04:15 AM       MEDICATIONS/SUPPLEMENTS:   Prior to Admission medications    Medication Sig Start Date End Date Taking? Authorizing Provider   famotidine (Pepcid) 40 mg tablet Take 20 mg by mouth nightly. Provider, Historical   glipiZIDE (GLUCOTROL) 5 mg tablet Take 2.5 mg by mouth two (2) times a day. Provider, Historical   glimepiride (AMARYL) 4 mg tablet Take 4 mg by mouth every morning. Patient not taking: Reported on 2022    Provider, Historical   ferrous sulfate 325 mg (65 mg iron) tablet Take  by mouth Daily (before breakfast). Patient not taking: Reported on 2022    Provider, Historical   clopidogreL (PLAVIX) 75 mg tab Take  by mouth. Provider, Historical   diphenhydrAMINE (BENADRYL) 2 % topical cream Apply  to affected area three (3) times daily as needed for Skin Irritation or Itching. Patient not taking: Reported on 2022 10/3/21   Herman Allan MD   pantoprazole (Protonix) 40 mg tablet Take 1 Tablet by mouth two (2) times a day. 21   Denver Dalal MD   pioglitazone (ACTOS) 30 mg tablet 15 mg two (2) times a day.  21   Gaston, MD Julissa   aspirin 81 mg chewable tablet Take 1 Tablet by mouth daily. 5/22/21   Xochilt Lentz MD   atorvastatin (LIPITOR) 80 mg tablet Take 1 Tablet by mouth daily. 5/22/21   Xochilt Lentz MD   losartan (COZAAR) 25 mg tablet Take 1 Tablet by mouth daily. Patient taking differently: Take 100 mg by mouth daily. Increased on 1/28/22 5/22/21   Xochilt Lentz MD   metoprolol succinate (TOPROL-XL) 25 mg XL tablet Take 1 Tablet by mouth daily. 5/22/21   Xochilt Lentz MD     Checks BG at home every morning, usually around 120 mg/dL. ANTHROPOMETRICS:    Ht Readings from Last 1 Encounters:   02/01/22 5' 7\" (1.702 m)      Wt Readings from Last 10 Encounters:   04/11/22 72.6 kg (160 lb)   04/07/22 72.9 kg (160 lb 12.8 oz)   04/06/22 73.4 kg (161 lb 12.8 oz)   04/04/22 73.5 kg (162 lb)   03/17/22 72.1 kg (159 lb)   03/16/22 72.1 kg (159 lb)   03/14/22 71.7 kg (158 lb)   03/10/22 72.4 kg (159 lb 9.6 oz)   03/09/22 68.5 kg (151 lb)   03/07/22 68.7 kg (151 lb 6.4 oz)       BMI: 25.06 kg/M2 Category: overweight  Waist: inches    Reported Wt Hx: Weight has been stable around 160#. Reported Diet Hx: States she needs to cut back on sweets - chocolate cake; any time of day - mostly every day. Follows a low sodium diet, but states she does add salt to food; most of the time pays attention to sodium in food. Fried fish 1x/week. Mostly eats food at whole. Uses olive oil to cook.      Rate Your Plate Score:  (Score 58-72: Making many healthy choices; 41-57: Some choices need improving 24-40: many choices need improving)    24 HOUR DIET RECALL  Breakfast Oatmeal with water & strawberries or peaches or toast with peanut butter jam (wheat bread)   Lunch None - sometimes a sweet    Dinner Baked chicken, baked potato/sweet potato; fried fish, peas, cabbage, navy beans/black eyed peas    Snacks None; ice cream - usually after lunch (josh butter)   Beverages Water, half/half tea      Maryanne Simpson     Environmental/Social: Pt cooks and grocery shops herself. Does band stretches at home for exercise, also walks - plans to start doing it every day. States she is constantly moving around and doing work in the house. NUTRITION INTERVENTION:  Nutrition 60 minute one-on-one education & goal setting with [address] [place] [address] Ne with Deandra Phelps relevant labs compared to ideals. Reviewed weight history and patient's verbalized weight goal as well as any real or perceived barriers to obtaining the goal. Collaborated with patient to set a specific short and long term weight goal.     Reviewed Rate Your Plate and conducted a verbal diet recall. Assessed for environmental, financial, psychosocial, physical and comorbidities that may impact the food and eating patterns / behaviors of [employer] with patient to set specific nutrient goals as well as specific food / behavior changes that will help patient meet the overall goal of following a heart healthy eating pattern (using guidelines as set forth by the American Heart Association and modeled after healthful eating patterns as recognized by the USDA Dietary Guidelines such as DASH, Mediterranean or plant-based). Briefly reviewed with Deandra Phelps the nutrition information in the Cardiac Rehab patient education book and encouraged Deandra Phelps to read thoroughly, ask questions as needed, and use for future reference for heart healthy nutrition information. Deandra Phelps is scheduled to participate in Cardiac Rehab group nutrition classes.       PATIENT GOALS:    Weight Goals: Maintenance     Nutrition Goals:  Daily Recommendations:  Calories: 1700 /day  (using Yahaira Garza with AF 1.3)    Saturated Fat: no more than 9 g/day  Trans Fat: 0 g/day  Sodium: no more than 7989-0022 mg/day  Fruit: 2 cups / day  Vegetables: 2-3 cups/day    Other:  - read and compare food labels  - bake fish at least 1x/month   - limit portion of ice cream and sweets most of the time       Keeping a food diary was recommended. Questions addressed. Follow-up plans discussed. [address] verbalized understanding.             Sabina Lizarraga RD

## 2022-04-13 ENCOUNTER — APPOINTMENT (OUTPATIENT)
Dept: CARDIAC REHAB | Age: 67
End: 2022-04-13
Payer: MEDICARE

## 2022-05-09 LAB
CREATININE, EXTERNAL: 1.04
LDL-C, EXTERNAL: 42
TOTAL CHOLESTEROL, NCHOLT: 124

## 2022-06-10 ENCOUNTER — TELEPHONE (OUTPATIENT)
Dept: PHARMACY | Age: 67
End: 2022-06-10

## 2022-06-10 NOTE — TELEPHONE ENCOUNTER
[facility] CLINICAL PHARMACY: ADHERENCE REVIEW  Identified care gap per [place]: fills at [facility]: Diabetes adherence    Last Visit: No show this year so far    Patient also appears to be prescribed: Atorvastatin, Glipizide and Losartan    ASSESSMENT  ACE/ARB ADHERENCE    Insurance Records claims through 06/10/2022 (2021 Nino Barrett = Na; YTD [place] = 96%; Potential Fail Date: 09/16/2022): Losartan last filled on 04/14/2022 for 90 day supply. Next refill due: 07/15/2022      Per [address]:   Losartan last picked up on 04/14/2022 for 90 day supply. 1 refills remaining. Billed through [place]    BP Readings from Last 3 Encounters:   02/01/22 (!) 177/81   01/14/22 (!) 140/64   11/12/21 (!) 153/78     Estimated Creatinine Clearance: 52.9 mL/min (by C-G formula based on SCr of 0.99 mg/dL). DIABETES ADHERENCE    Insurance Records claims through 06/10/2022 (2021 Nino Barrett = NA; YTD [place] = 100%; Potential Fail Date: 09/23/2022):   Glipizide er last filled on 04/16/2022 for 90 day supply. Next refill due: 07/15/2022  Pioglitazone last filled on 01/17/2022 for 90 day supply. Next refill due: 04/17/2022-PAST DUE    Per [address]:   Glipizide last picked up on 04/16/2022 for 90 day supply. 1 refills remaining. Billed through [facility]  Pioglitazone last picked up on 01/17/2022 for 90 day supply. 0 refills remaining. Billed through [employer] Value Date/Time    Hemoglobin A1c 7.6 (H) 05/19/2021 03:38 PM     NOTE A1c <9%    [id number] Records claims through 06/10/2022 (2021 [facility] [place] = NA; YTD [place] = 96%; Potential Fail Date: 09/16/2022): Atorvastatin last filled on 04/13/2022 for 90 day supply. Next refill due: 07/12/2022    Per [address]:   Atorvastatin last picked up on 04/16/2022 for 90 day supply. 1 refills remaining.  Billed through [employer] Value Date/Time    Cholesterol, total 187 05/19/2021 04:15 AM    HDL Cholesterol 69 05/19/2021 04:15 AM    LDL, calculated 105.6 (H) 2021 04:15 AM    VLDL, calculated 12.4 2021 04:15 AM    Triglyceride 62 2021 04:15 AM    CHOL/HDL Ratio 2.7 2021 04:15 AM     ALT (SGPT)   Date Value Ref Range Status   2022 20 12 - 78 U/L Final     AST (SGOT)   Date Value Ref Range Status   2022 19 15 - 37 U/L Final     The 10-year ASCVD risk score (Trinity Cason, et al., 2013) is: 17.9%    Values used to calculate the score:      Age: 75 years      Sex: Female      Is Non- : Yes      Diabetic: No      Tobacco smoker: No      Systolic Blood Pressure: 599 mmHg      Is BP treated: No      HDL Cholesterol: 69 mg/dL      Total Cholesterol: 187 mg/dL     PLAN  The following are interventions that have been identified:  - Patient overdue refilling Actos and active on home medication list  - Patient overdue refilling Actos. Unsure if patient is still prescribed this medication.   - Patient needs refills for ActosActos    tried to call pt, she hung up several times, or could not hear me        Tried to call patient- she could not hear me, and kept hanging up the phone. I called Dr. Zayra Sands office to get confirmation of the medication she is to take. Per Dr. Zayra Sands office- 2022 She is to be on glimepiride and actos? But her med list has Glipizide er 2.5mg  I let her know Glipizide er 5 mg 90/90 had been filled. And asked if they could send a new rx for actos to her pharmacy at [address] [place] [address]      No future appointments.     Jeannie Orantes, PharmD, [address] [facility]   Department, toll free: [phone] Option 2  ================================================================================  For Pharmacy Admin Tracking Only     CPA in place: No   Recommendation Provided To: Provider: 1 via Note to Provider and Called provider office    Intervention Detail: Adherence Monitorin and New Rx: 1, reason: Improve Adherence   Gap Closed?: No   Intervention Accepted By: Provider: 1   Time Spent (min): 15

## 2022-06-17 ENCOUNTER — HOSPITAL ENCOUNTER (EMERGENCY)
Age: 67
Discharge: HOME | End: 2022-06-17
Attending: EMERGENCY MEDICINE
Payer: MEDICARE

## 2022-06-17 VITALS
HEART RATE: 63 BPM | OXYGEN SATURATION: 99 % | BODY MASS INDEX: 24.48 KG/M2 | SYSTOLIC BLOOD PRESSURE: 171 MMHG | DIASTOLIC BLOOD PRESSURE: 67 MMHG | RESPIRATION RATE: 17 BRPM | WEIGHT: 156 LBS | HEIGHT: 67 IN | TEMPERATURE: 98 F

## 2022-06-17 DIAGNOSIS — L30.9 DERMATITIS: Primary | ICD-10-CM

## 2022-06-17 PROCEDURE — 99283 EMERGENCY DEPT VISIT LOW MDM: CPT

## 2022-06-17 RX ORDER — PREDNISONE 10 MG/1
TABLET ORAL
Qty: 42 TABLET | Refills: 0 | Status: SHIPPED | OUTPATIENT
Start: 2022-06-17 | End: 2022-08-04 | Stop reason: ALTCHOICE

## 2022-06-17 RX ORDER — TRIAMCINOLONE ACETONIDE 5 MG/G
CREAM TOPICAL 2 TIMES DAILY
Qty: 15 G | Refills: 0 | Status: SHIPPED | OUTPATIENT
Start: 2022-06-17 | End: 2022-08-04 | Stop reason: ALTCHOICE

## 2022-06-17 NOTE — ED PROVIDER NOTES
EMERGENCY DEPARTMENT HISTORY AND PHYSICAL EXAM        Date: 6/17/2022  Patient Name: Julius Yeung    History of Presenting Illness     Chief Complaint   Patient presents with    Arm Pain     bilateral       History Provided By: Patient    HPI: Julius Yeung, 75 y.o. female history of diabetes, hypertension, and acute coronary syndrome who presents with bilateral rash on the arms. States it is itchy but not painful. It improves with cool air. Denies any new contacts with detergents or insect bites. No new medications. She denied having any pain in her arms. No weakness in her arms. No chest pain. PCP: Huan Gomez MD    Current Outpatient Medications   Medication Sig Dispense Refill    predniSONE (DELTASONE) 10 mg tablet Take daily as instructed. This is prescribed as a tapered dose. Take all tablets for the day in the morning with food orally. The dosage will be reduced over a period of 12 days. Day 1 take 6 tablets; Day 2 take 6 tablets; Day 3 take 5 tablets; Day 4 take 5 tablets; Day 5 take 4 tablets; Day 6 take 4 tablets; Day 7 take 3 tablets; Day 8 take 3 tablets; Day 9 take 2 platelets; Day 10 take 2 tablets; Day 11 take 1 tablet; Day 12 take 1 tablet 42 Tablet 0    triamcinolone (ARISTOCORT) 0.5 % topical cream Apply  to affected area two (2) times a day. use thin layer 15 g 0    famotidine (Pepcid) 40 mg tablet Take 20 mg by mouth nightly.  glipiZIDE (GLUCOTROL) 5 mg tablet Take 2.5 mg by mouth two (2) times a day.  glimepiride (AMARYL) 4 mg tablet Take 4 mg by mouth every morning. (Patient not taking: Reported on 1/25/2022)      ferrous sulfate 325 mg (65 mg iron) tablet Take  by mouth Daily (before breakfast). (Patient not taking: Reported on 1/25/2022)      clopidogreL (PLAVIX) 75 mg tab Take  by mouth.  diphenhydrAMINE (BENADRYL) 2 % topical cream Apply  to affected area three (3) times daily as needed for Skin Irritation or Itching.  (Patient not taking: Reported on 1/25/2022) 30 g 0    pantoprazole (Protonix) 40 mg tablet Take 1 Tablet by mouth two (2) times a day. 60 Tablet 1    pioglitazone (ACTOS) 30 mg tablet 15 mg two (2) times a day.  aspirin 81 mg chewable tablet Take 1 Tablet by mouth daily. 30 Tablet 0    atorvastatin (LIPITOR) 80 mg tablet Take 1 Tablet by mouth daily. 30 Tablet 0    losartan (COZAAR) 25 mg tablet Take 1 Tablet by mouth daily. (Patient taking differently: Take 100 mg by mouth daily. Increased on 1/28/22) 30 Tablet 0    metoprolol succinate (TOPROL-XL) 25 mg XL tablet Take 1 Tablet by mouth daily. 30 Tablet 0       Past History     Past Medical History:  Past Medical History:   Diagnosis Date    Diabetes ([facility] [address]     History of heart artery stent     Hypertension     Ill-defined condition     GI bleed    Myocardial infarction [facility])     May 2021    STEMI (ST elevation myocardial infarction) [facility])     Jan 13, 2022       Past Surgical History:  Past Surgical History:   Procedure Laterality Date    HX GYN      vaginal deliveries x2    HX HEART CATHETERIZATION      2 stents (may 2021, jan 2022)    HX HYSTERECTOMY      partial    HX OTHER SURGICAL      egd- gastric polyp    HI ABDOMEN SURGERY PROC UNLISTED         Family History:  Family History   Problem Relation Age of Onset    Kidney Disease Mother     Alcohol abuse Father     Cancer Sister     Hypertension Sister     Diabetes Sister     Dementia Brother     Diabetes Sister     Diabetes Sister     Diabetes Sister     Diabetes Sister     No Known Problems Sister     Cancer Brother     Heart Disease Brother     Diabetes Brother        Social History:  Social History     Tobacco Use    Smoking status: Never Smoker    Smokeless tobacco: Never Used   Substance Use Topics    Alcohol use: Not Currently    Drug use: Never       Allergies:  No Known Allergies    Review of Systems   Review of Systems   Constitutional: Negative for fever.    HENT: Negative for congestion. Eyes: Negative for visual disturbance. Respiratory: Negative for shortness of breath. Cardiovascular: Negative for chest pain. Gastrointestinal: Negative for abdominal pain. Genitourinary: Negative for dysuria. Musculoskeletal: Negative for arthralgias. Skin: Positive for rash. Neurological: Negative for headaches. Physical Exam   Constitutional: No acute distress. Well-nourished. Skin: Patch like rash on the upper extremities that appears dry and scaly. No papules or urticaria. No induration. ENT: No rhinorrhea. No cough. Head is normocephalic and atraumatic. Eye: No proptosis or conjunctival injections. Respiratory: No apparent respiratory distress. Gastrointestinal: Nondistended. Musculoskeletal: No obvious bony deformities. Psychiatric: Cooperative. Appropriate mood and affect. Diagnostic Study Results     Labs -   No results found for this or any previous visit (from the past 24 hour(s)). Radiologic Studies -   No orders to display     CT Results  (Last 48 hours)    None        CXR Results  (Last 48 hours)    None          Medical Decision Making and ED Course     I reviewed the available vital signs, nursing notes, past medical history, past surgical history, family history, and social history. Vital Signs - Reviewed the patient's vital signs. Patient Vitals for the past 12 hrs:   Temp Pulse Resp BP SpO2   06/17/22 1333 -- 63 17 (!) 171/67 99 %   06/17/22 1159 98 °F (36.7 °C) 71 18 (!) 177/79 100 %     Medical Decision Making:   Presented with rash. The differential diagnosis is contact dermatitis, allergic dermatitis, eczema. Will treat rash with prednisone taper as well as triamcinolone cream.  Recommended follow-up with a dermatologist.  No need for any further testing at this time. Appears to be a dermatitis and not cellulitis. Disposition     Discharged    DISCHARGE PLAN:  1.    Current Discharge Medication List      CONTINUE these medications which have NOT CHANGED    Details   famotidine (Pepcid) 40 mg tablet Take 20 mg by mouth nightly. glipiZIDE (GLUCOTROL) 5 mg tablet Take 2.5 mg by mouth two (2) times a day. glimepiride (AMARYL) 4 mg tablet Take 4 mg by mouth every morning. ferrous sulfate 325 mg (65 mg iron) tablet Take  by mouth Daily (before breakfast). clopidogreL (PLAVIX) 75 mg tab Take  by mouth. diphenhydrAMINE (BENADRYL) 2 % topical cream Apply  to affected area three (3) times daily as needed for Skin Irritation or Itching. Qty: 30 g, Refills: 0      pantoprazole (Protonix) 40 mg tablet Take 1 Tablet by mouth two (2) times a day. Qty: 60 Tablet, Refills: 1      pioglitazone (ACTOS) 30 mg tablet 15 mg two (2) times a day. aspirin 81 mg chewable tablet Take 1 Tablet by mouth daily. Qty: 30 Tablet, Refills: 0      atorvastatin (LIPITOR) 80 mg tablet Take 1 Tablet by mouth daily. Qty: 30 Tablet, Refills: 0      losartan (COZAAR) 25 mg tablet Take 1 Tablet by mouth daily. Qty: 30 Tablet, Refills: 0      metoprolol succinate (TOPROL-XL) 25 mg XL tablet Take 1 Tablet by mouth daily. Qty: 30 Tablet, Refills: 0           2. Follow-up Information     Follow up With Specialties Details Why [address] [place] [address] Dermatology  Schedule an appointment as soon as possible for a visit   Sohan Roquecherry [address] [place] [address], [address] [place] [address]  [phone] Dermatology  Schedule an appointment as soon as possible for a visit   [address] [place] [address],[facility] Floor  [phone]    Primary care doctor  Schedule an appointment as soon as possible for a visit in 3 days          3. Return to ED if worse     Diagnosis     Clinical impression:   1. Dermatitis         Attestation:  Please note that this dictation was completed with [employer], the [employer] voice recognition software.  Quite often unanticipated grammatical, syntax, homophones, and other interpretive errors are inadvertently transcribed by the computer software. Please disregard these errors. Please excuse any errors that have escaped final proofreading. Thank you.   Chace Urias, DO

## 2022-08-04 ENCOUNTER — TELEPHONE (OUTPATIENT)
Dept: PHARMACY | Age: 67
End: 2022-08-04

## 2022-08-04 RX ORDER — AMLODIPINE BESYLATE 5 MG/1
5 TABLET ORAL DAILY
Status: ON HOLD | COMMUNITY
Start: 2022-06-11 | End: 2022-09-30

## 2022-08-04 RX ORDER — LOSARTAN POTASSIUM 100 MG/1
100 TABLET ORAL DAILY
COMMUNITY

## 2022-08-04 NOTE — LETTER
[address]  [address], [place] [address]           08/04/22     Dear Erin Silva,    We tried to reach you recently regarding your Losartan 100 mg, but were unable to reach you on the telephone. If you are no longer taking or taking differently, please call us at [phone] Option 2, so that we can discuss this and update your medication profile. This medication is filled and ready for you at: [address], [address] Phone: [phone]   Please pick this medication up as soon as possible, if you have not already done so. It appears that this medication has not been filled at proper times. We are worried you might be missing doses or not taking it as directed. It is important that you take your medications regularly and try not to miss a single dose.     Some ways to help you remember to take and refill your medications are to:  · Use a pill box, set an alarm, and/or keep your medication near something that you do every day  · Fill a 3-month supply of your prescription at a time to save you time and trips to the pharmacy - if you would like assistance in switching your prescriptions to a 3-month supply, please contact us [phone] Option 2,  · Ask your pharmacy if they participate in [facility]", a program where you can  all of your medications on the same day  · Ask your pharmacy if you can be set up with automatic refill, where they will automatically refill your prescription when it is due and let you know it's ready to     Sincerely,   Diego Lopez, PharmD, [address] [facility], toll free: [phone] Option 2

## 2022-08-04 NOTE — TELEPHONE ENCOUNTER
[facility] CLINICAL PHARMACY: ADHERENCE REVIEW  Identified care gap per [place]: fills at [facility]: ACE/ARB adherence    Last Visit: NA      ASSESSMENT  ACE/ARB ADHERENCE    Insurance Records claims through 8/4/2022 (2021 [place] = NA%; KAROLYN Barrett =  86%; Potential Fail Date: 9/15/2022 ):   Losartan 100 mg  last filled on 4/14/2022 for 90 day supply. Next refill due: 7/13/2022-PAST DUE 22 days    Per [facility]:   Losartan last picked up on 4/14/2022 for 90 day supply. 1 refills remaining. Billed through [place]    BP Readings from Last 3 Encounters:   06/17/22 (!) 171/67   02/01/22 (!) 177/81   01/14/22 (!) 140/64     CrCl cannot be calculated (Patient's most recent lab result is older than the maximum 180 days allowed. ). DIABETES ADHERENCE    Insurance Records claims through 8/4/2022 (2021 [place] = NA; KAROLYN Barrett =  100%; Potential Fail Date: 12/23/2022 ):   Glipizide last filled on 7/17/2022 for 90 day supply. Next refill due: 10/5/2022  Pioglitazone last filled on 6/21/2022 for 90 day supply. Next refill due: 9/19/2022        Lab Results   Component Value Date/Time    Hemoglobin A1c 7.6 (H) 05/19/2021 03:38 PM     NOTE A1c not yet completed this calendar year    [address] Records claims through 8/4/2022 (2021 [place] = NA; KAROLYN [place] =  97%; Potential Fail Date: 12/15/2022 ):   Atorvastatin last filled on 6/20/2022 for 90 day supply.  Next refill due: MAX 10/10/2022      Lab Results   Component Value Date/Time    Cholesterol, total 187 05/19/2021 04:15 AM    HDL Cholesterol 69 05/19/2021 04:15 AM    LDL, calculated 105.6 (H) 05/19/2021 04:15 AM    VLDL, calculated 12.4 05/19/2021 04:15 AM    Triglyceride 62 05/19/2021 04:15 AM    CHOL/HDL Ratio 2.7 05/19/2021 04:15 AM     ALT (SGPT)   Date Value Ref Range Status   01/13/2022 20 12 - 78 U/L Final     AST (SGOT)   Date Value Ref Range Status   01/13/2022 19 15 - 37 U/L Final     The 10-year ASCVD risk score (Jo Ann Norman, et al., 2013) is: 16.7% Values used to calculate the score:      Age: 75 years      Sex: Female      Is Non- : Yes      Diabetic: No      Tobacco smoker: No      Systolic Blood Pressure: 460 mmHg      Is BP treated: No      HDL Cholesterol: 69 mg/dL      Total Cholesterol: 187 mg/dL     PLAN  The following are interventions that have been identified:  - Patient overdue refilling Losartan 100 mg  and active on home medication list  - [facility] is filling today 2022  Unable to leave message and Letter sent to patient    No future appointments.     Megan Mina PharmD, [address] [place] [address]   Department, toll free: [phone] Option 2  ================================================================================  For Pharmacy Admin Tracking Only    CPA in place: No  Recommendation Provided To: Pharmacy: 1  Intervention Detail: Adherence Monitorin  Gap Closed?: No  Intervention Accepted By: Pharmacy: 1  Time Spent (min): 15

## 2022-09-15 ENCOUNTER — HOSPITAL ENCOUNTER (OUTPATIENT)
Dept: LAB | Age: 67
Discharge: HOME | End: 2022-09-15
Payer: MEDICARE

## 2022-09-15 ENCOUNTER — TELEPHONE (OUTPATIENT)
Dept: PHARMACY | Age: 67
End: 2022-09-15

## 2022-09-15 ENCOUNTER — TRANSCRIBE ORDER (OUTPATIENT)
Dept: REGISTRATION | Age: 67
End: 2022-09-15

## 2022-09-15 DIAGNOSIS — I10 ESSENTIAL HYPERTENSION, MALIGNANT: ICD-10-CM

## 2022-09-15 DIAGNOSIS — E11.9 DIABETES MELLITUS (HCC): Primary | ICD-10-CM

## 2022-09-15 DIAGNOSIS — E11.9 DIABETES MELLITUS (HCC): ICD-10-CM

## 2022-09-15 LAB
ALBUMIN SERPL-MCNC: 3.5 G/DL (ref 3.5–5)
ALBUMIN/GLOB SERPL: 0.9 {RATIO} (ref 1.1–2.2)
ALP SERPL-CCNC: 131 U/L (ref 45–117)
ALT SERPL-CCNC: 18 U/L (ref 12–78)
ANION GAP SERPL CALC-SCNC: 6 MMOL/L (ref 5–15)
AST SERPL W P-5'-P-CCNC: 20 U/L (ref 15–37)
BILIRUB SERPL-MCNC: 0.5 MG/DL (ref 0.2–1)
BUN SERPL-MCNC: 17 MG/DL (ref 6–20)
BUN/CREAT SERPL: 18 (ref 12–20)
CA-I BLD-MCNC: 8.9 MG/DL (ref 8.5–10.1)
CHLORIDE SERPL-SCNC: 106 MMOL/L (ref 97–108)
CO2 SERPL-SCNC: 30 MMOL/L (ref 21–32)
CREAT SERPL-MCNC: 0.97 MG/DL (ref 0.55–1.02)
CREAT UR-MCNC: 93.3 MG/DL
EST. AVERAGE GLUCOSE BLD GHB EST-MCNC: 146 MG/DL
GLOBULIN SER CALC-MCNC: 3.7 G/DL (ref 2–4)
GLUCOSE SERPL-MCNC: 75 MG/DL (ref 65–100)
HBA1C MFR BLD: 6.7 % (ref 4–5.6)
MICROALBUMIN UR-MCNC: 0.61 MG/DL
MICROALBUMIN/CREAT UR-RTO: 7 MGMALB/GCRE (ref 0–30)
POTASSIUM SERPL-SCNC: 4 MMOL/L (ref 3.5–5.1)
PROT SERPL-MCNC: 7.2 G/DL (ref 6.4–8.2)
SODIUM SERPL-SCNC: 142 MMOL/L (ref 136–145)

## 2022-09-15 PROCEDURE — 36415 COLL VENOUS BLD VENIPUNCTURE: CPT

## 2022-09-15 PROCEDURE — 83036 HEMOGLOBIN GLYCOSYLATED A1C: CPT

## 2022-09-15 PROCEDURE — 80053 COMPREHEN METABOLIC PANEL: CPT

## 2022-09-15 PROCEDURE — 82043 UR ALBUMIN QUANTITATIVE: CPT

## 2022-09-15 NOTE — TELEPHONE ENCOUNTER
[facility] CLINICAL PHARMACY: ADHERENCE REVIEW  Identified care gap per [place]: fills at [facility]: ACE/ARB, Diabetes, and Statin adherence    Last Visit: [address] [facility] Records claims through 09/12/2022 (2021 [place] = n/a%; KAROLYN [facility] Nena =  72%; Potential Fail Date: 09/15/2022 ):   Losartan 100 mg last filled on 04/14/2022 for 90 day supply. Next refill due: 07/13/2022    Per  [facility] Portal:   Losartan 100 mg last filled on 04/14/2022 for 90 day supply. Per [facility]:   Losartan 100 mg last picked up on 04/14/2022 for 90 day supply. 1 refills remaining. Billed through [place]    BP Readings from Last 3 Encounters:   06/17/22 (!) 171/67   02/01/22 (!) 177/81   01/14/22 (!) 140/64     CrCl cannot be calculated (Patient's most recent lab result is older than the maximum 180 days allowed. ). DIABETES ADHERENCE    Insurance Records claims through 09/12/2022 (2021 [place] = n/a%; [facility]  [facility] Potential Fail Date: 12/23/2022 ):   Glipizide 5 mg ER last filled on 07/17/2022 for 90 day supply. Next refill due: 10/15/2022    Per  [facility] Portal:   Glipizide 5 MG ER last filled on 07/17/2022 for 90 day supply. Per [facility]:   Glipizide 5 MG ER last picked up on 07/17/2022 for 90 day supply. 1 refills remaining. Billed through [employer] claims through 09/12/2022 (2021 [place] = n/a%; [facility] Potential Fail Date: 12/23/2022): Actos 30 mg last filled on 06/21/2022 for 90 day supply. Next refill due: 09/19/2022    Per [facility] Portal:   Actos 30 mg last filled on 06/21/2022 for 90 day supply. Per [facility]:   Actos 30 mg last picked up on 06/21/2022 for 90 day supply. 1 refills remaining.  Billed through [employer] Rx Value Date/Time    Hemoglobin A1c 7.6 (H) 05/19/2021 03:38 PM     NOTE A1c not yet completed this calendar year    [address] Records claims through 09/12/2022 (2021 [place] = n/a%; YTD [place] Barrett =  98%; Potential Fail Date: 12/15/2022 ):   Atorvastatin 80mg last filled on 2022 for 90 day supply. Next refill due: 2022    Per  [facility] Portal:   Atorvastatin 80 mg last filled on  for 90 day supply. Per [facility]:   Atorvastatin 80 mg last picked up on 2022 for 90 day supply. 1 refills remaining. Billed through [facility] Value Date/Time    Cholesterol, total 187 2021 04:15 AM    HDL Cholesterol 69 2021 04:15 AM    LDL, calculated 105.6 (H) 2021 04:15 AM    VLDL, calculated 12.4 2021 04:15 AM    Triglyceride 62 2021 04:15 AM    CHOL/HDL Ratio 2.7 2021 04:15 AM     ALT (SGPT)   Date Value Ref Range Status   2022 20 12 - 78 U/L Final     AST (SGOT)   Date Value Ref Range Status   2022 19 15 - 37 U/L Final     The 10-year ASCVD risk score (Vivi Johnson, et al., 2013) is: 16.7%    Values used to calculate the score:      Age: 75 years      Sex: Female      Is Non- : Yes      Diabetic: No      Tobacco smoker: No      Systolic Blood Pressure: 991 mmHg      Is BP treated: No      HDL Cholesterol: 69 mg/dL      Total Cholesterol: 187 mg/dL     PLAN  The following are interventions that have been identified:  - Patient overdue refilling Losartan and active on home medication list    No patient out reach planned at this time. Pharmacy processed refill at 0$ copay bob be ready for  later today to go with Atorvastatin that was filled. No future appointments.     [address] [place] [address]  Direct: [phone]  Department, toll free:[phone], Option 2     For Pharmacy [id number] [place] [address] Road in place: No  Recommendation Provided To: Pharmacy: 1  Intervention Detail: Adherence Monitorin  Gap Closed?: Yes  Intervention Accepted By: Pharmacy: 1  Time Spent (min): 15

## 2022-09-27 ENCOUNTER — TRANSCRIBE ORDER (OUTPATIENT)
Dept: OTHER | Age: 67
End: 2022-09-27

## 2022-09-27 ENCOUNTER — HOSPITAL ENCOUNTER (OUTPATIENT)
Dept: GENERAL RADIOLOGY | Age: 67
Discharge: HOME | End: 2022-09-27
Payer: MEDICARE

## 2022-09-27 DIAGNOSIS — M79.601 RIGHT UPPER LIMB PAIN: Primary | ICD-10-CM

## 2022-09-27 DIAGNOSIS — M79.602 LEFT ARM PAIN: ICD-10-CM

## 2022-09-27 DIAGNOSIS — M79.601 RIGHT UPPER LIMB PAIN: ICD-10-CM

## 2022-09-27 PROCEDURE — 72040 X-RAY EXAM NECK SPINE 2-3 VW: CPT

## 2022-09-30 ENCOUNTER — APPOINTMENT (OUTPATIENT)
Dept: ENDOSCOPY | Age: 67
End: 2022-09-30
Attending: INTERNAL MEDICINE
Payer: MEDICARE

## 2022-09-30 ENCOUNTER — ANESTHESIA EVENT (OUTPATIENT)
Dept: ENDOSCOPY | Age: 67
End: 2022-09-30
Payer: MEDICARE

## 2022-09-30 ENCOUNTER — HOSPITAL ENCOUNTER (OUTPATIENT)
Age: 67
Setting detail: OUTPATIENT SURGERY
Discharge: HOME | End: 2022-09-30
Attending: INTERNAL MEDICINE | Admitting: INTERNAL MEDICINE
Payer: MEDICARE

## 2022-09-30 ENCOUNTER — ANESTHESIA (OUTPATIENT)
Dept: ENDOSCOPY | Age: 67
End: 2022-09-30
Payer: MEDICARE

## 2022-09-30 VITALS
RESPIRATION RATE: 18 BRPM | WEIGHT: 175 LBS | HEART RATE: 74 BPM | TEMPERATURE: 97.7 F | OXYGEN SATURATION: 98 % | DIASTOLIC BLOOD PRESSURE: 60 MMHG | SYSTOLIC BLOOD PRESSURE: 133 MMHG | BODY MASS INDEX: 28.12 KG/M2 | HEIGHT: 66 IN

## 2022-09-30 LAB
GLUCOSE BLD STRIP.AUTO-MCNC: 73 MG/DL (ref 65–100)
PERFORMED BY, TECHID: NORMAL

## 2022-09-30 PROCEDURE — 2709999900 HC NON-CHARGEABLE SUPPLY: Performed by: INTERNAL MEDICINE

## 2022-09-30 PROCEDURE — 74011250636 HC RX REV CODE- 250/636: Performed by: ANESTHESIOLOGY

## 2022-09-30 PROCEDURE — 82962 GLUCOSE BLOOD TEST: CPT

## 2022-09-30 PROCEDURE — 77030019988 HC FCPS ENDOSC DISP BSC -B: Performed by: INTERNAL MEDICINE

## 2022-09-30 PROCEDURE — 74011250636 HC RX REV CODE- 250/636: Performed by: INTERNAL MEDICINE

## 2022-09-30 PROCEDURE — 74011000250 HC RX REV CODE- 250: Performed by: ANESTHESIOLOGY

## 2022-09-30 PROCEDURE — 76060000032 HC ANESTHESIA 0.5 TO 1 HR: Performed by: INTERNAL MEDICINE

## 2022-09-30 PROCEDURE — 76040000007: Performed by: INTERNAL MEDICINE

## 2022-09-30 RX ORDER — SODIUM CHLORIDE, SODIUM LACTATE, POTASSIUM CHLORIDE, CALCIUM CHLORIDE 600; 310; 30; 20 MG/100ML; MG/100ML; MG/100ML; MG/100ML
50 INJECTION, SOLUTION INTRAVENOUS CONTINUOUS
Status: DISCONTINUED | OUTPATIENT
Start: 2022-09-30 | End: 2022-09-30 | Stop reason: HOSPADM

## 2022-09-30 RX ORDER — LIDOCAINE HYDROCHLORIDE 20 MG/ML
INJECTION, SOLUTION EPIDURAL; INFILTRATION; INTRACAUDAL; PERINEURAL AS NEEDED
Status: DISCONTINUED | OUTPATIENT
Start: 2022-09-30 | End: 2022-09-30 | Stop reason: HOSPADM

## 2022-09-30 RX ORDER — PROPOFOL 10 MG/ML
INJECTION, EMULSION INTRAVENOUS
Status: DISCONTINUED | OUTPATIENT
Start: 2022-09-30 | End: 2022-09-30 | Stop reason: HOSPADM

## 2022-09-30 RX ADMIN — PHENYLEPHRINE HYDROCHLORIDE 200 MCG: 10 INJECTION INTRAVENOUS at 11:01

## 2022-09-30 RX ADMIN — SODIUM CHLORIDE, POTASSIUM CHLORIDE, SODIUM LACTATE AND CALCIUM CHLORIDE 50 ML/HR: 600; 310; 30; 20 INJECTION, SOLUTION INTRAVENOUS at 09:03

## 2022-09-30 RX ADMIN — PROPOFOL 175 MCG/KG/MIN: 10 INJECTION, EMULSION INTRAVENOUS at 10:54

## 2022-09-30 RX ADMIN — LIDOCAINE HYDROCHLORIDE 40 MG: 20 INJECTION, SOLUTION EPIDURAL; INFILTRATION; INTRACAUDAL; PERINEURAL at 10:54

## 2022-09-30 NOTE — ANESTHESIA PREPROCEDURE EVALUATION
Relevant Problems   CARDIOVASCULAR   (+) CAD (coronary artery disease)   (+) STEMI involving oth coronary artery of inferior wall (HCC)   (+) Unstable angina (HCC)       Anesthetic History   No history of anesthetic complications            Review of Systems / Medical History  Patient summary reviewed and pertinent labs reviewed    Pulmonary  Within defined limits            Pertinent negatives: No smoker     Neuro/Psych   Within defined limits           Cardiovascular    Hypertension          Past MI, CAD and cardiac stents      Comments: Normal sinus rhythm   Inferior infarct , age undetermined     Normal cavity size, wall thickness and systolic function (ejection fraction normal). Wall motion: normal. The estimated EF is 55 - 60%. There is mild (grade 1) left ventricular diastolic dysfunction.    GI/Hepatic/Renal  Within defined limits              Endo/Other    Diabetes: type 2         Other Findings            Past Medical History:   Diagnosis Date    Diabetes ([facility] [address]     History of heart artery stent     Hypertension     Ill-defined condition     GI bleed    Myocardial infarction [facility])     May 2021    STEMI (ST elevation myocardial infarction) [facility])     Jan 13, 2022       Past Surgical History:   Procedure Laterality Date    HX GYN      vaginal deliveries x2    HX HEART CATHETERIZATION      2 stents (may 2021, jan 2022)    HX HYSTERECTOMY      partial    HX OTHER SURGICAL      egd- gastric polyp    [place] ABDOMEN SURGERY PROC UNLISTED         Current Outpatient Medications   Medication Instructions    amLODIPine (NORVASC) 5 mg, Oral, DAILY    aspirin 81 mg, Oral, DAILY    atorvastatin (LIPITOR) 80 mg, Oral, DAILY    clopidogreL (PLAVIX) 75 mg tab Oral    famotidine (PEPCID) 20 mg, Oral, EVERY BEDTIME    glipiZIDE (GLUCOTROL) 2.5 mg, Oral, 2 TIMES DAILY    losartan (COZAAR) 100 mg, Oral, DAILY    metoprolol succinate (TOPROL-XL) 25 mg, Oral, DAILY    pantoprazole (PROTONIX) 40 mg, Oral, 2 TIMES DAILY    pioglitazone (ACTOS) 15 mg, 2 TIMES DAILY       No current facility-administered medications for this encounter. Current Outpatient Medications   Medication Sig    losartan (COZAAR) 100 mg tablet Take 100 mg by mouth in the morning.  amLODIPine (NORVASC) 5 mg tablet Take 5 mg by mouth in the morning.  famotidine (PEPCID) 40 mg tablet Take 20 mg by mouth nightly.  glipiZIDE (GLUCOTROL) 5 mg tablet Take 2.5 mg by mouth two (2) times a day.  clopidogreL (PLAVIX) 75 mg tab Take  by mouth.  pantoprazole (Protonix) 40 mg tablet Take 1 Tablet by mouth two (2) times a day.  pioglitazone (ACTOS) 30 mg tablet 15 mg two (2) times a day.  aspirin 81 mg chewable tablet Take 1 Tablet by mouth daily.  atorvastatin (LIPITOR) 80 mg tablet Take 1 Tablet by mouth daily.  metoprolol succinate (TOPROL-XL) 25 mg XL tablet Take 1 Tablet by mouth daily. No data found.     Lab Results   Component Value Date/Time    WBC 3.9 01/13/2022 11:30 AM    HGB 11.7 01/13/2022 11:30 AM    HCT 36.7 01/13/2022 11:30 AM    PLATELET 360 46/23/9439 11:30 AM    MCV 90.4 01/13/2022 11:30 AM     Lab Results   Component Value Date/Time    Sodium 142 09/15/2022 11:14 AM    Potassium 4.0 09/15/2022 11:14 AM    Chloride 106 09/15/2022 11:14 AM    CO2 30 09/15/2022 11:14 AM    Anion gap 6 09/15/2022 11:14 AM    Glucose 75 09/15/2022 11:14 AM    BUN 17 09/15/2022 11:14 AM    Creatinine 0.97 09/15/2022 11:14 AM    BUN/Creatinine ratio 18 09/15/2022 11:14 AM    GFR est AA >60 09/15/2022 11:14 AM    GFR est non-AA 57 (L) 09/15/2022 11:14 AM    Calcium 8.9 09/15/2022 11:14 AM     No results found for: APTT, PTP, INR, INREXT  Lab Results   Component Value Date/Time    Glucose 75 09/15/2022 11:14 AM    Glucose (POC) 81 01/14/2022 07:23 AM         Physical Exam    Airway  Mallampati: III    Neck ROM: normal range of motion        Cardiovascular    Rhythm: regular  Rate: normal         Dental         Pulmonary  Breath sounds clear to auscultation               Abdominal         Other Findings            Anesthetic Plan    ASA: 3  Anesthesia type: MAC and total IV anesthesia    Monitoring Plan: Continuous noninvasive hemodynamic monitoring      Induction: Intravenous  Anesthetic plan and risks discussed with: Patient

## 2022-11-04 ENCOUNTER — APPOINTMENT (OUTPATIENT)
Dept: CT IMAGING | Age: 67
End: 2022-11-04
Attending: EMERGENCY MEDICINE
Payer: MEDICARE

## 2022-11-04 ENCOUNTER — HOSPITAL ENCOUNTER (EMERGENCY)
Age: 67
Discharge: HOME | End: 2022-11-04
Attending: EMERGENCY MEDICINE
Payer: MEDICARE

## 2022-11-04 VITALS
HEIGHT: 67 IN | BODY MASS INDEX: 27.15 KG/M2 | DIASTOLIC BLOOD PRESSURE: 68 MMHG | WEIGHT: 173 LBS | TEMPERATURE: 100.1 F | SYSTOLIC BLOOD PRESSURE: 148 MMHG | HEART RATE: 98 BPM | RESPIRATION RATE: 18 BRPM | OXYGEN SATURATION: 99 %

## 2022-11-04 DIAGNOSIS — N20.0 KIDNEY STONES: Primary | ICD-10-CM

## 2022-11-04 LAB
ALBUMIN SERPL-MCNC: 3.5 G/DL (ref 3.5–5)
ALBUMIN/GLOB SERPL: 0.9 {RATIO} (ref 1.1–2.2)
ALP SERPL-CCNC: 135 U/L (ref 45–117)
ALT SERPL-CCNC: 14 U/L (ref 12–78)
ANION GAP SERPL CALC-SCNC: 6 MMOL/L (ref 5–15)
APPEARANCE UR: CLEAR
AST SERPL W P-5'-P-CCNC: 19 U/L (ref 15–37)
BACTERIA URNS QL MICRO: ABNORMAL /HPF
BASOPHILS # BLD: 0 K/UL (ref 0–0.1)
BASOPHILS NFR BLD: 1 % (ref 0–1)
BILIRUB SERPL-MCNC: 0.5 MG/DL (ref 0.2–1)
BILIRUB UR QL: NEGATIVE
BUN SERPL-MCNC: 15 MG/DL (ref 6–20)
BUN/CREAT SERPL: 15 (ref 12–20)
CA-I BLD-MCNC: 9.1 MG/DL (ref 8.5–10.1)
CHLORIDE SERPL-SCNC: 102 MMOL/L (ref 97–108)
CO2 SERPL-SCNC: 29 MMOL/L (ref 21–32)
COLOR UR: ABNORMAL
CREAT SERPL-MCNC: 1 MG/DL (ref 0.55–1.02)
DIFFERENTIAL METHOD BLD: ABNORMAL
EOSINOPHIL # BLD: 0 K/UL (ref 0–0.4)
EOSINOPHIL NFR BLD: 1 % (ref 0–7)
ERYTHROCYTE [DISTWIDTH] IN BLOOD BY AUTOMATED COUNT: 13.4 % (ref 11.5–14.5)
GLOBULIN SER CALC-MCNC: 4.1 G/DL (ref 2–4)
GLUCOSE SERPL-MCNC: 90 MG/DL (ref 65–100)
GLUCOSE UR STRIP.AUTO-MCNC: 250 MG/DL
HCT VFR BLD AUTO: 34.1 % (ref 35–47)
HGB BLD-MCNC: 10.9 G/DL (ref 11.5–16)
HGB UR QL STRIP: ABNORMAL
IMM GRANULOCYTES # BLD AUTO: 0 K/UL (ref 0–0.04)
IMM GRANULOCYTES NFR BLD AUTO: 0 % (ref 0–0.5)
INR PPP: 1 (ref 0.9–1.1)
KETONES UR QL STRIP.AUTO: NEGATIVE MG/DL
LEUKOCYTE ESTERASE UR QL STRIP.AUTO: ABNORMAL
LYMPHOCYTES # BLD: 0.6 K/UL (ref 0.8–3.5)
LYMPHOCYTES NFR BLD: 12 % (ref 12–49)
MCH RBC QN AUTO: 27.9 PG (ref 26–34)
MCHC RBC AUTO-ENTMCNC: 32 G/DL (ref 30–36.5)
MCV RBC AUTO: 87.4 FL (ref 80–99)
MONOCYTES # BLD: 0.4 K/UL (ref 0–1)
MONOCYTES NFR BLD: 8 % (ref 5–13)
NEUTS SEG # BLD: 4.1 K/UL (ref 1.8–8)
NEUTS SEG NFR BLD: 78 % (ref 32–75)
NITRITE UR QL STRIP.AUTO: NEGATIVE
NRBC # BLD: 0 K/UL (ref 0–0.01)
NRBC BLD-RTO: 0 PER 100 WBC
PH UR STRIP: 7.5 [PH] (ref 5–8)
PLATELET # BLD AUTO: 218 K/UL (ref 150–400)
PMV BLD AUTO: 10.2 FL (ref 8.9–12.9)
POTASSIUM SERPL-SCNC: 3.8 MMOL/L (ref 3.5–5.1)
PROT SERPL-MCNC: 7.6 G/DL (ref 6.4–8.2)
PROT UR STRIP-MCNC: NEGATIVE MG/DL
PROTHROMBIN TIME: 12.6 SEC (ref 11.9–14.6)
RBC # BLD AUTO: 3.9 M/UL (ref 3.8–5.2)
RBC #/AREA URNS HPF: ABNORMAL /HPF (ref 0–3)
SODIUM SERPL-SCNC: 137 MMOL/L (ref 136–145)
SP GR UR REFRACTOMETRY: 1.01 (ref 1–1.03)
UROBILINOGEN UR QL STRIP.AUTO: 0.2 EU/DL (ref 0.2–1)
WBC # BLD AUTO: 5.2 K/UL (ref 3.6–11)
WBC URNS QL MICRO: ABNORMAL /HPF (ref 0–5)

## 2022-11-04 PROCEDURE — 81001 URINALYSIS AUTO W/SCOPE: CPT

## 2022-11-04 PROCEDURE — 74011250637 HC RX REV CODE- 250/637: Performed by: EMERGENCY MEDICINE

## 2022-11-04 PROCEDURE — 85610 PROTHROMBIN TIME: CPT

## 2022-11-04 PROCEDURE — 96375 TX/PRO/DX INJ NEW DRUG ADDON: CPT

## 2022-11-04 PROCEDURE — 80053 COMPREHEN METABOLIC PANEL: CPT

## 2022-11-04 PROCEDURE — 99284 EMERGENCY DEPT VISIT MOD MDM: CPT

## 2022-11-04 PROCEDURE — 85025 COMPLETE CBC W/AUTO DIFF WBC: CPT

## 2022-11-04 PROCEDURE — 96374 THER/PROPH/DIAG INJ IV PUSH: CPT

## 2022-11-04 PROCEDURE — 74176 CT ABD & PELVIS W/O CONTRAST: CPT

## 2022-11-04 PROCEDURE — 74011250636 HC RX REV CODE- 250/636: Performed by: EMERGENCY MEDICINE

## 2022-11-04 RX ORDER — ONDANSETRON 2 MG/ML
4 INJECTION INTRAMUSCULAR; INTRAVENOUS
Status: COMPLETED | OUTPATIENT
Start: 2022-11-04 | End: 2022-11-04

## 2022-11-04 RX ORDER — TAMSULOSIN HYDROCHLORIDE 0.4 MG/1
0.4 CAPSULE ORAL DAILY
Qty: 5 CAPSULE | Refills: 0 | Status: SHIPPED | OUTPATIENT
Start: 2022-11-04 | End: 2022-11-09

## 2022-11-04 RX ORDER — KETOROLAC TROMETHAMINE 30 MG/ML
15 INJECTION, SOLUTION INTRAMUSCULAR; INTRAVENOUS
Status: COMPLETED | OUTPATIENT
Start: 2022-11-04 | End: 2022-11-04

## 2022-11-04 RX ORDER — TAMSULOSIN HYDROCHLORIDE 0.4 MG/1
0.4 CAPSULE ORAL
Status: COMPLETED | OUTPATIENT
Start: 2022-11-04 | End: 2022-11-04

## 2022-11-04 RX ORDER — OXYCODONE AND ACETAMINOPHEN 5; 325 MG/1; MG/1
1 TABLET ORAL
Qty: 6 TABLET | Refills: 0 | Status: SHIPPED | OUTPATIENT
Start: 2022-11-04 | End: 2022-11-06

## 2022-11-04 RX ADMIN — ONDANSETRON 4 MG: 2 INJECTION INTRAMUSCULAR; INTRAVENOUS at 16:49

## 2022-11-04 RX ADMIN — SODIUM CHLORIDE 1000 ML: 9 INJECTION, SOLUTION INTRAVENOUS at 16:50

## 2022-11-04 RX ADMIN — TAMSULOSIN HYDROCHLORIDE 0.4 MG: 0.4 CAPSULE ORAL at 18:02

## 2022-11-04 RX ADMIN — KETOROLAC TROMETHAMINE 15 MG: 30 INJECTION, SOLUTION INTRAMUSCULAR at 18:02

## 2022-11-04 NOTE — DISCHARGE INSTRUCTIONS
Encourage increase fluid intake. Motrin or tylenol as directed for pain. Follow-up with [address] [facility]/ Dr. Yanet Alarcon  At [phone] on Monday.

## 2022-11-04 NOTE — ED PROVIDER NOTES
EMERGENCY DEPARTMENT HISTORY AND PHYSICAL EXAM      Date: 11/4/2022  Patient Name: Sue Hua    History of Presenting Illness     Chief Complaint   Patient presents with    Abdominal Pain       History Provided By: Patient    HPI: Sue Hua, 75 y.o. female abd pain with radiation to from back to abd for 2 days. Progressively worsen. No nausea or vomiting. Hx of kidneys in past.  She denies fevers or chills. There are no other complaints, changes, or physical findings at this time. Pain scale is 7/10. PCP: Marilee Frederick MD    Current Outpatient Medications   Medication Sig Dispense Refill    losartan (COZAAR) 100 mg tablet Take 100 mg by mouth in the morning. famotidine (PEPCID) 40 mg tablet Take 20 mg by mouth nightly. glipiZIDE (GLUCOTROL) 5 mg tablet Take 2.5 mg by mouth two (2) times a day. clopidogreL (PLAVIX) 75 mg tab Take  by mouth.      pantoprazole (Protonix) 40 mg tablet Take 1 Tablet by mouth two (2) times a day. 60 Tablet 1    aspirin 81 mg chewable tablet Take 1 Tablet by mouth daily. 30 Tablet 0    metoprolol succinate (TOPROL-XL) 25 mg XL tablet Take 1 Tablet by mouth daily.  30 Tablet 0       Past History   Past Medical History:  Past Medical History:   Diagnosis Date    Diabetes ([address]     History of heart artery stent     Hypertension     Ill-defined condition     GI bleed    Myocardial infarction [facility])     May 2021    STEMI (ST elevation myocardial infarction) [facility])     Jan 13, 2022       Past Surgical History:  Past Surgical History:   Procedure Laterality Date    COLONOSCOPY N/A 9/30/2022    COLONOSCOPY performed by Tania Gonzalez MD at [address] [facility],[address] [facility] GYN      vaginal deliveries x2    [address]      2 stents (may 2021, jan 2022)    HX HYSTERECTOMY      partial    HX OTHER SURGICAL      egd- gastric polyp    CO ABDOMEN SURGERY PROC UNLISTED         Family History:  Family History   Problem Relation Age of Onset    Kidney Disease Mother Alcohol abuse Father     Cancer Sister     Hypertension Sister     Diabetes Sister     Dementia Brother     Diabetes Sister     Diabetes Sister     Diabetes Sister     Diabetes Sister     No Known Problems Sister     Cancer Brother     Heart Disease Brother     Diabetes Brother        Social History:  Social History     Tobacco Use    Smoking status: Never     Passive exposure: Never    Smokeless tobacco: Never   Vaping Use    Vaping Use: Never used   Substance Use Topics    Alcohol use: Not Currently    Drug use: Never       Allergies:  No Known Allergies  Review of Systems   Review of Systems   Constitutional: Negative. HENT: Negative. Eyes: Negative. Respiratory: Negative. Cardiovascular: Negative. Gastrointestinal:  Positive for abdominal pain. Endocrine: Negative. Genitourinary: Negative. Musculoskeletal:  Positive for back pain. Skin: Negative. Allergic/Immunologic: Negative. Neurological: Negative. Hematological: Negative. Psychiatric/Behavioral: Negative. Physical Exam   Physical Exam  Vitals and nursing note reviewed. Constitutional:       Appearance: Normal appearance. HENT:      Head: Normocephalic. Right Ear: Tympanic membrane normal.      Left Ear: Tympanic membrane normal.      Nose: Nose normal.      Mouth/Throat:      Mouth: Mucous membranes are moist.   Eyes:      Pupils: Pupils are equal, round, and reactive to light. Cardiovascular:      Rate and Rhythm: Normal rate. Pulses: Normal pulses. Pulmonary:      Effort: Pulmonary effort is normal.   Abdominal:      General: Abdomen is flat. Palpations: Abdomen is soft. Musculoskeletal:         General: Normal range of motion. Cervical back: Normal range of motion and neck supple. Skin:     Capillary Refill: Capillary refill takes less than 2 seconds. Neurological:      General: No focal deficit present. Mental Status: She is alert.    Psychiatric:         Mood and Affect: Mood normal.       Lab and Diagnostic Study Results   Labs -     Recent Results (from the past 12 hour(s))   URINALYSIS W/ RFLX MICROSCOPIC    Collection Time: 11/04/22  2:54 PM   Result Value Ref Range    Color Yellow/Straw      Appearance Clear Clear      Specific gravity 1.015 1.003 - 1.030      pH (UA) 7.5 5.0 - 8.0      Protein Negative Negative mg/dL    Glucose 250 (A) Negative mg/dL    Ketone Negative Negative mg/dL    Bilirubin Negative Negative      Blood Trace (A) Negative      Urobilinogen 0.2 0.2 - 1.0 EU/dL    Nitrites Negative Negative      Leukocyte Esterase Small (A) Negative     URINE MICROSCOPIC    Collection Time: 11/04/22  2:54 PM   Result Value Ref Range    WBC 5-10 0 - 5 /hpf    RBC 0-5 0 - 3 /hpf    Bacteria 1+ (A) Negative /hpf       Radiologic Studies -   [unfilled]  CT Results  (Last 48 hours)      None          CXR Results  (Last 48 hours)      None            Medical Decision Making and ED Course   Differential Diagnosis & Medical Decision Making Provider Note: kidney stones, bowel obstruction, electrolyst imbalance      - I am the first and primary provider for this patient. I reviewed the vital signs, available nursing notes, past medical history, past surgical history, family history and social history. The patient's presenting problems have been discussed, and the staff are in agreement with the care plan formulated and outlined with them. I have encouraged them to ask questions as they arise throughout their visit. Vital Signs-Reviewed the patient's vital signs. Patient Vitals for the past 12 hrs:   Temp Pulse Resp BP SpO2   11/04/22 1453 100.1 °F (37.8 °C) (!) 114 16 (!) 152/69 97 %       EKG interpretation: (Preliminary): EKG Interpreted by me.   Shows     ED Course:            Procedures and Critical Care     Performed by: Corrinne Heft, MD  Procedures      CRITICAL CARE NOTE :    4:07 PM    I Corrinne Heft, MD    Disposition   Disposition: Condition stable  DC- Adult Discharges: All of the diagnostic tests were reviewed and questions answered. Diagnosis, care plan and treatment options were discussed. The patient understands the instructions and will follow up as directed. The patients results have been reviewed with them. They have been counseled regarding their diagnosis. The patient verbally convey understanding and agreement of the signs, symptoms, diagnosis, treatment and prognosis and additionally agrees to follow up as recommended with their PCP in 24 - 48 hours. They also agree with the care-plan and convey that all of their questions have been answered. I have also put together some discharge instructions for them that include: 1) educational information regarding their diagnosis, 2) how to care for their diagnosis at home, as well a 3) list of reasons why they would want to return to the ED prior to their follow-up appointment, should their condition change. DISCHARGE PLAN:  1. Current Discharge Medication List        CONTINUE these medications which have NOT CHANGED    Details   losartan (COZAAR) 100 mg tablet Take 100 mg by mouth in the morning. famotidine (PEPCID) 40 mg tablet Take 20 mg by mouth nightly. glipiZIDE (GLUCOTROL) 5 mg tablet Take 2.5 mg by mouth two (2) times a day. clopidogreL (PLAVIX) 75 mg tab Take  by mouth.      pantoprazole (Protonix) 40 mg tablet Take 1 Tablet by mouth two (2) times a day. Qty: 60 Tablet, Refills: 1      aspirin 81 mg chewable tablet Take 1 Tablet by mouth daily. Qty: 30 Tablet, Refills: 0      metoprolol succinate (TOPROL-XL) 25 mg XL tablet Take 1 Tablet by mouth daily. Qty: 30 Tablet, Refills: 0           2. Follow-up Information    None       3. Return to ED if worse   4. Current Discharge Medication List        Remove if admitted/discharged    Diagnosis/Clinical Impression     Clinical Impression:     ICD-10-CM ICD-9-CM    1.  Kidney stones  N20.0 592.0     left, small Attestations: Sylvain Arriola MD, am the primary clinician of record. Please note that this dictation was completed with [employer], the computer voice recognition software. Quite often unanticipated grammatical, syntax, homophones, and other interpretive errors are inadvertently transcribed by the computer software. Please disregard these errors. Please excuse any errors that have escaped final proofreading. Thank you.

## 2022-12-14 ENCOUNTER — TELEPHONE (OUTPATIENT)
Dept: PHARMACY | Age: 67
End: 2022-12-14

## 2022-12-14 RX ORDER — LOSARTAN POTASSIUM 100 MG/1
TABLET ORAL
Qty: 90 TABLET | Refills: 0 | Status: SHIPPED | OUTPATIENT
Start: 2022-12-14 | End: 2022-12-18

## 2022-12-14 RX ORDER — GLIPIZIDE 5 MG/1
TABLET, FILM COATED, EXTENDED RELEASE ORAL
Qty: 90 TABLET | Refills: 0 | Status: SHIPPED | OUTPATIENT
Start: 2022-12-14

## 2022-12-14 NOTE — TELEPHONE ENCOUNTER
[facility] CLINICAL PHARMACY: ADHERENCE REVIEW  Identified care gap per [place]: fills at [facility]: ACE/ARB adherence    ASSESSMENT  ACE/ARB ADHERENCE    Insurance Records claims through 12/04/2022 (2021 [place] = n/a%; KAROLYN [place] =  79%; Potential Fail Date: 12/15/2022 ):   Losartan 100mg last filled on 09/15/2022 for 90 day supply. Next refill due: 12/14/2022    Per  [facility] Portal:   Losartan 100mg last filled on 09/15/2022 for 90 day supply. Per [facility]:   Losartan 100mg last picked up on 09/15/2022 for 90 day supply. 0 refills remaining. Billed through [place]    BP Readings from Last 3 Encounters:   11/04/22 (!) 148/68   09/30/22 133/60   06/17/22 (!) 171/67     Estimated Creatinine Clearance: 58.1 mL/min (by C-G formula based on SCr of 1 mg/dL). DIABETES ADHERENCE    Insurance Records claims through 12/04/2022 (2021 [place] = n/a%; KAROLYN [facility] [place] =  100%; Passed in 2022):   Glipizide 5mg ER last filled on 10/12/2022 for 90 day supply. Next refill due: 01/10/2023    Insurance Records claims through 12/04/2022 (2021 [place] = n/a%; KAROLYN [place] = 100%; Passed in 2022):   Pioglitazone 30mg last filled on 09/18/2022 for 90 day supply. Next refill due: 12/17/2022      Lab Results   Component Value Date/Time    Hemoglobin A1c 6.7 (H) 09/15/2022 11:14 AM    Hemoglobin A1c 7.6 (H) 05/19/2021 03:38 PM     NOTE A1c <9%    [id number] Records claims through 12/04/2022 (2021 [place] = n/a%; YTD PDC =  98%; Passed in 2022): Atorvastatin 80mg last filled on 09/14/2022 for 90 day supply.  Next refill due: 12/13/2022        Lab Results   Component Value Date/Time    Cholesterol, total 187 05/19/2021 04:15 AM    HDL Cholesterol 69 05/19/2021 04:15 AM    LDL, calculated 105.6 (H) 05/19/2021 04:15 AM    VLDL, calculated 12.4 05/19/2021 04:15 AM    Triglyceride 62 05/19/2021 04:15 AM    CHOL/HDL Ratio 2.7 05/19/2021 04:15 AM     ALT (SGPT)   Date Value Ref Range Status   11/04/2022 14 12 - 78 U/L Final     AST (SGOT)   Date Value Ref Range Status   11/04/2022 19 15 - 37 U/L Final     The 10-year ASCVD risk score (Marvel MOORE, et al., 2019) is: 27.5%    Values used to calculate the score:      Age: 75 years      Sex: Female      Is Non- : Yes      Diabetic: Yes      Tobacco smoker: No      Systolic Blood Pressure: 110 mmHg      Is BP treated: No      HDL Cholesterol: 69 mg/dL      Total Cholesterol: 187 mg/dL     PLAN  The following are interventions that have been identified:  - Patient overdue refilling Losartan and Atorvastatin and active on home medication list  - Patient needs refills for Losartan, Atorvastastin, and pioglitazone    No patient out reach planned at this time. Pharmacy to fax over refill request to [facility]. Will monitor and outreach once new scripts have been processed and filled. No future appointments.     Sari Koehler  Direct: [phone]  Department, toll free:[phone], Option 2

## 2022-12-14 NOTE — LETTER
[address]  [address], [address]        [address] [facility]  [place] [address]         12/15/22     Dear Juanita Shukla,    We tried to reach you recently regarding your Atorvastatin but were unable to reach you on the telephone. If you are no longer taking or taking differently, please call us at [phone] Option 2, that we can discuss this and update your medication profile. Atorvastatin has been filled and is ready for you at: [address] [place] [address] Phone: [phone]     Please pick this medication up as soon as possible, if you have not already done so. It appears that this medication has not been filled at proper times. We are worried you might be missing doses or not taking it as directed. It is important that you take your medications regularly and try not to miss a single dose. Atorvastatin not only helps to lower bad cholesterol, but has many other benefits. This is not a medication we \"feel\" working, so it can be difficult to remember to take. While you may not feel anything, remember to take this medication every day, so that you are getting the full benefit of this medication. It will help prevent heart attack, stroke, and other cardiovascular disease such as atherosclerosis- the hardening of your arteries.       Some ways to help you remember to take and refill your medications are to:  · Use a pill box, set an alarm, and/or keep your medication near something that you do every day  · Fill a 3-month supply of your prescription at a time to save you time and trips to the pharmacy - if you would like assistance in switching your prescriptions to a 3-month supply, please contact us [phone] Option 2  · Ask your pharmacy if they participate in [facility]", a program where you can  all of your medications on the same day  · Ask your pharmacy if you can be set up with automatic refill, where they will automatically refill your prescription when it is due and let you know it's ready to     Sincerely,   Terry [address]   Department, toll free: [phone] Option 2

## 2022-12-15 NOTE — TELEPHONE ENCOUNTER
New order received. Called patient and left VM to inform her that refill was processed and ready for . Sending letter as well.      For Pharmacy [address] in place: No  Recommendation Provided To: Patient/Caregiver: 2 via Telephone  Intervention Detail: Adherence Monitorin  Gap Closed?: No  Intervention Accepted By: Patient/Caregiver: 0  Time Spent (min): 15

## 2022-12-18 RX ORDER — PIOGLITAZONEHYDROCHLORIDE 30 MG/1
TABLET ORAL
Qty: 90 TABLET | Refills: 0 | Status: SHIPPED | OUTPATIENT
Start: 2022-12-18

## 2022-12-18 RX ORDER — LOSARTAN POTASSIUM 100 MG/1
TABLET ORAL
Qty: 90 TABLET | Refills: 0 | Status: SHIPPED | OUTPATIENT
Start: 2022-12-18

## 2023-01-22 PROBLEM — D64.9 MILD ANEMIA: Status: ACTIVE | Noted: 2023-01-22

## 2023-01-22 PROBLEM — S32.010D COMPRESSION FRACTURE OF L1 VERTEBRA WITH ROUTINE HEALING: Status: ACTIVE | Noted: 2023-01-22

## 2023-01-22 PROBLEM — N36.2 URETHRAL CARUNCLE: Status: ACTIVE | Noted: 2023-01-22

## 2023-01-22 PROBLEM — H90.3 HEARING LOSS SENSORY, BILATERAL: Status: ACTIVE | Noted: 2023-01-22

## 2023-01-22 PROBLEM — E78.00 HYPERCHOLESTEROLEMIA: Status: ACTIVE | Noted: 2023-01-22

## 2023-01-22 PROBLEM — R79.89 ELEVATED SERUM CREATININE: Status: ACTIVE | Noted: 2023-01-22

## 2023-01-22 PROBLEM — R74.8 ELEVATED ALKALINE PHOSPHATASE LEVEL: Status: ACTIVE | Noted: 2023-01-22

## 2023-01-22 PROBLEM — I10 BENIGN ESSENTIAL HYPERTENSION: Status: ACTIVE | Noted: 2023-01-22

## 2023-01-22 PROBLEM — I21.11 STEMI INVOLVING RIGHT CORONARY ARTERY (HCC): Status: ACTIVE | Noted: 2023-01-22

## 2023-01-22 PROBLEM — D72.819 LEUKOPENIA, UNSPECIFIED TYPE: Status: ACTIVE | Noted: 2023-01-22

## 2023-01-22 PROBLEM — M85.852 OSTEOPENIA OF LEFT HIP: Status: ACTIVE | Noted: 2023-01-22

## 2023-01-22 PROBLEM — K31.7 GASTRIC POLYP: Status: ACTIVE | Noted: 2023-01-22

## 2023-01-22 RX ORDER — MELATONIN
1000 DAILY
COMMUNITY

## 2023-01-22 RX ORDER — TRIAMCINOLONE ACETONIDE 0.25 MG/G
OINTMENT TOPICAL 2 TIMES DAILY
COMMUNITY

## 2023-01-22 RX ORDER — BLOOD SUGAR DIAGNOSTIC
1 STRIP MISCELLANEOUS DAILY
COMMUNITY
Start: 2022-11-02

## 2023-01-22 RX ORDER — ATORVASTATIN CALCIUM 80 MG/1
80 TABLET, FILM COATED ORAL DAILY
COMMUNITY
Start: 2022-12-14

## 2023-01-22 RX ORDER — LANOLIN ALCOHOL/MO/W.PET/CERES
325 CREAM (GRAM) TOPICAL
COMMUNITY

## 2023-01-22 RX ORDER — METOPROLOL SUCCINATE 50 MG/1
50 TABLET, EXTENDED RELEASE ORAL DAILY
COMMUNITY

## 2023-01-22 RX ORDER — CALCIUM CARBONATE 600 MG
600 TABLET ORAL DAILY
COMMUNITY

## 2023-02-03 ENCOUNTER — OFFICE VISIT (OUTPATIENT)
Dept: INTERNAL MEDICINE CLINIC | Age: 68
End: 2023-02-03
Payer: MEDICARE

## 2023-02-03 VITALS
HEIGHT: 67 IN | DIASTOLIC BLOOD PRESSURE: 60 MMHG | SYSTOLIC BLOOD PRESSURE: 140 MMHG | BODY MASS INDEX: 27.62 KG/M2 | HEART RATE: 98 BPM | TEMPERATURE: 97.2 F | WEIGHT: 176 LBS | OXYGEN SATURATION: 98 %

## 2023-02-03 DIAGNOSIS — R74.8 ELEVATED ALKALINE PHOSPHATASE LEVEL: ICD-10-CM

## 2023-02-03 DIAGNOSIS — E78.00 TYPE 2 DIABETES MELLITUS WITH HYPERCHOLESTEROLEMIA (HCC): Primary | ICD-10-CM

## 2023-02-03 DIAGNOSIS — D64.9 MILD ANEMIA: ICD-10-CM

## 2023-02-03 DIAGNOSIS — Z95.5 S/P CORONARY ARTERY STENT PLACEMENT: ICD-10-CM

## 2023-02-03 DIAGNOSIS — E11.9 ENCOUNTER FOR DIABETIC FOOT EXAM (HCC): ICD-10-CM

## 2023-02-03 DIAGNOSIS — E11.69 TYPE 2 DIABETES MELLITUS WITH HYPERCHOLESTEROLEMIA (HCC): Primary | ICD-10-CM

## 2023-02-03 DIAGNOSIS — R79.89 ELEVATED SERUM CREATININE: ICD-10-CM

## 2023-02-03 DIAGNOSIS — I10 ESSENTIAL HYPERTENSION: ICD-10-CM

## 2023-02-03 RX ORDER — AMLODIPINE BESYLATE 5 MG/1
5 TABLET ORAL DAILY
COMMUNITY
Start: 2023-01-23

## 2023-02-03 RX ORDER — BLOOD SUGAR DIAGNOSTIC
STRIP MISCELLANEOUS DAILY
Qty: 100 STRIP | Refills: 1 | Status: SHIPPED | OUTPATIENT
Start: 2023-02-03

## 2023-02-03 NOTE — PROGRESS NOTES
Chief Complaint   Patient presents with    Labs    Follow Up Chronic Condition    Arm Pain     1. \"Have you been to the ER, urgent care clinic since your last visit? Hospitalized since your last visit? \" No    2. \"Have you seen or consulted any other health care providers outside of the [address] [place] [address] since your last visit? \" No     3. For patients aged 39-70: Has the patient had a colonoscopy / FIT/ Cologuard? Yes - Care Gap present. Most recent result on file      If the patient is female:    4. For patients aged 41-77: Has the patient had a mammogram within the past 2 years? Yes - Care Gap present. Most recent result on file      5. For patients aged 21-65: Has the patient had a pap smear?  NA - based on age or sex

## 2023-02-03 NOTE — PROGRESS NOTES
[address] [facility] Internal Medicine  [address]  [place], [address] [facility]  Phone: [phone]      Don Bryson (: 1955) is a 70 y.o. female, established patient, here for evaluation of the following chief complaint(s):  Labs, Follow Up Chronic Condition, and Arm Pain         SUBJECTIVE/OBJECTIVE:  HPI:  Patient is here for follow up, she recently had blood work. She has Gelacio Jones with her today, her friend. She is planning  to change her PCP, to someone close to her home in [facility]. . She checks her blood sugar 1 time daily; some of her blood sugar ranges 119-150's. She denies any episodes of low blood sugar and she needs to schedule her annual eye exam for . She has some numbness and tingling in her hand. She needs a refill on her test strips. Patient stated she prefers not to have any vaccinations. 2023 Hemo 11.0, Hema 32.9, Glu 137, Creat 0.89, K+ 4.1, Alk Phos 145, Chol T 116, Trig 51, HDL 59, LDL 45, TSH 2.61, A1c and Alb/Creat pending. Prior to Admission medications    Medication Sig Start Date End Date Taking? Authorizing Provider   amLODIPine (NORVASC) 5 mg tablet Take 5 mg by mouth daily. 23  Yes Provider, Historical   atorvastatin (LIPITOR) 80 mg tablet Take 80 mg by mouth daily. 22  Yes Provider, Historical   Accu-Chek Gill Plus test strp strip 1 Strip by Does Not Apply route daily. E11.65 22  Yes Provider, Historical   metoprolol succinate (TOPROL-XL) 50 mg XL tablet Take 50 mg by mouth daily. Yes Provider, Historical   triamcinolone acetonide (KENALOG) 0.025 % ointment Apply  to affected area two (2) times a day. use thin layer   Yes Provider, Historical   cholecalciferol (VITAMIN D3) (1000 Units /25 mcg) tablet Take 1,000 Units by mouth daily. Yes Provider, Historical   ferrous sulfate 325 mg (65 mg iron) tablet Take 325 mg by mouth Daily (before breakfast).    Yes Provider, Historical   calcium carbonate (CALTREX) 600 mg calcium (1,500 mg) tablet Take 600 mg by mouth daily. Yes Provider, Historical   pioglitazone (ACTOS) 30 mg tablet TAKE 1 TABLET BY MOUTH ONCE DAILY WITH  DINNER 12/18/22  Yes Blanca Hernández MD   losartan (COZAAR) 100 mg tablet Take 1 tablet by mouth once daily 12/18/22  Yes Blanca Hernández MD   glipiZIDE SR (GLUCOTROL XL) 5 mg CR tablet Take 1 tablet by mouth once daily 12/14/22  Yes Blanca Hernández MD   clopidogreL (PLAVIX) 75 mg tab Take  by mouth. Yes Provider, Historical   pantoprazole (Protonix) 40 mg tablet Take 1 Tablet by mouth two (2) times a day. 6/2/21  Yes Ulysses Cluster, MD   aspirin 81 mg chewable tablet Take 1 Tablet by mouth daily. 5/22/21  Yes Bessy Lentz MD   glipiZIDE (GLUCOTROL) 5 mg tablet Take 2.5 mg by mouth two (2) times a day.   Patient not taking: Reported on 2/3/2023    Provider, Historical        Allergies   Allergen Reactions    Azithromycin Itching        Past Medical History:   Diagnosis Date    Benign essential hypertension     Compression fracture of L1 vertebra with routine healing     Diabetes (HCC)     Elevated alkaline phosphatase level     Elevated serum creatinine     Gastric polyp     Hearing loss sensory, bilateral     History of heart artery stent     Hypercholesterolemia     Hypertension     Ill-defined condition     GI bleed    Leukopenia, unspecified type     Mild anemia     Myocardial infarction [facility])     May 2021    Osteopenia of left hip     STEMI (ST elevation myocardial infarction) [facility])     Jan 13, 2022    STEMI involving right coronary artery [facility])     Urethral caruncle         Family History   Problem Relation Age of Onset    Kidney Disease Mother     Alcohol abuse Father     Cancer Sister     Hypertension Sister     Diabetes Sister     Dementia Brother     Diabetes Sister     Diabetes Sister     Diabetes Sister     Diabetes Sister     No Known Problems Sister     Cancer Brother     Heart Disease Brother     Diabetes Brother         Past Surgical History:   Procedure Laterality Date    COLONOSCOPY N/A 9/30/2022    COLONOSCOPY performed by Kiara Gonzalez MD at [address] [facility]      vaginal deliveries x2    HX HEART CATHETERIZATION      2 stents (may 2021, jan 2022)    HX HYSTERECTOMY      partial    HX OTHER SURGICAL      egd- gastric polyp    LA UNLISTED PROCEDURE ABDOMEN PERITONEUM & OMENTUM         Review of Systems  Constitutional:  Negative for chills and fever. HENT:  Negative for congestion, ear pain, nosebleeds, sinus pain, sore throat and tinnitus. Eyes:  Negative for redness. Respiratory:  Negative for cough and shortness of breath. Cardiovascular:  Negative for chest pain and palpitations. Gastrointestinal:  Negative for abdominal pain, diarrhea, nausea and vomiting. Endocrine: Negative for cold intolerance and polyuria. Genitourinary:  Negative for dysuria and hematuria. Musculoskeletal:  Negative for back pain and neck pain. Skin:  Negative for rash. Neurological:  Negative for dizziness and headaches. Tingling left hand  Psychiatric/Behavioral: Negative. BP (!) 140/60 (BP 1 Location: Left upper arm, BP Patient Position: Sitting)   Pulse 98   Temp 97.2 °F (36.2 °C) (Temporal)   Ht 5' 7\" (1.702 m)   Wt 176 lb (79.8 kg)   SpO2 98%   BMI 27.57 kg/m²      Physical Exam  Constitutional:       Appearance: Normal appearance. Patient is with her friend Juancarlos Hendrickson. HENT:      Head: Normocephalic and atraumatic. Patient is hard of hearing. Right Ear: External ear normal.      Left Ear: External ear normal.      Nose: Nose normal.      Mouth/Throat:      Mouth: Mucous membranes are moist.   Eyes:      Extraocular Movements: Extraocular movements intact. Pupils: Pupils are equal, round, and reactive to light. Cardiovascular:      Rate and Rhythm: Normal rate and regular rhythm. Pulmonary:      Effort: Pulmonary effort is normal.      Breath sounds: Normal breath sounds.    Abdominal:      Palpations: Abdomen is soft. Tenderness: There is no abdominal tenderness. Musculoskeletal:      Cervical back: Normal range of motion and neck supple. Right lower leg: No edema. Left lower leg: No edema. Diabetic foot exam shows no rash over the foot, dorsalis pedis present bilaterally, monofilament test normal, thickening and subungual hyperkeratosis of the big and second toes bilaterally. Skin:     General: Skin is warm and dry. Neurological:      General: No focal deficit present. Mental Status: She is alert and oriented to person, place, and time. Psychiatric:         Mood and Affect: Mood normal.    ASSESSMENT/PLAN:  Below is the assessment and plan developed based on review of pertinent history, physical exam, labs, studies, and medications. 1. Type 2 diabetes mellitus with hypercholesterolemia ([facility] [address]  Comments:  Glucose 137 A1c and microalbumin pending, creatinine normal at 0.89. Will await A1c. Advised to continue diabetic diet and current medications. Diabetic eye exam advised. Will send her records to her new primary care physician in [place] [address] once she sees her doctor. 2. Mild anemia  Comments:  Hemoglobin slightly low at 11.0 and hematocrit 32.9, patient had a colonoscopy in September 2022, had hemorrhoids and diverticulosis to repeat in 5 years  3. Elevated alkaline phosphatase level  Comments:  CT abdomen from November 2022 shows normal gallbladder, has stable left adrenal adenoma and a small nonobstructing left kidney stone. Advised to take vitamin D  4. Essential hypertension  Comments:  Systolic blood pressure is slightly high at 140, advised low-sodium diet and medications and monitor blood pressure at home  5. S/P coronary artery stent placement  Comments:  Advised to continue Plavix, aspirin and atorvastatin and to keep follow-up with cardiology  6. Encounter for diabetic foot exam [facility])  Comments:  Patient has a probable onychomycosis advised to see a podiatrist in [place] [address]  7. Elevated serum creatinine  Comments:  Creatinine was slightly high at 1.04 in May 2022 and repeat creatinine is normal at 0.89, advised to continue fluid intake and to avoid Aleve, Motrin, Profen. No follow-ups on file. There are no Patient Instructions on file for this visit. Health Maintenance Due   Topic Date Due    Hepatitis C Screening  Never done    COVID-19 Vaccine (1) Never done    Pneumococcal 65+ years (1 - PCV) Never done    Foot Exam Q1  Never done    Eye Exam Retinal or Dilated  Never done    DTaP/Tdap/Td series (1 - Tdap) Never done    Shingles Vaccine (1 of 2) Never done    Bone Densitometry (Dexa) Screening  Never done    Medicare Yearly Exam  Never done        Aspects of this note may have been generated using voice recognition software. Despite editing, there may be unrecognized errors. An electronic signature was used to authenticate this note.   -- Michelle Kimbrough MD

## 2023-03-07 ENCOUNTER — TRANSCRIBE ORDER (OUTPATIENT)
Dept: SCHEDULING | Age: 68
End: 2023-03-07

## 2023-03-07 DIAGNOSIS — Z12.31 VISIT FOR SCREENING MAMMOGRAM: Primary | ICD-10-CM

## 2023-03-27 RX ORDER — GLIPIZIDE 5 MG/1
TABLET, FILM COATED, EXTENDED RELEASE ORAL
Qty: 90 TABLET | Refills: 0 | Status: SHIPPED | OUTPATIENT
Start: 2023-03-27

## 2023-04-05 LAB — HBA1C MFR BLD HPLC: 7 %

## 2023-04-22 DIAGNOSIS — Z12.31 VISIT FOR SCREENING MAMMOGRAM: Primary | ICD-10-CM

## 2023-05-15 RX ORDER — ATORVASTATIN CALCIUM 80 MG/1
TABLET, FILM COATED ORAL
Qty: 90 TABLET | Refills: 0 | OUTPATIENT
Start: 2023-05-15

## 2023-05-15 RX ORDER — HYDROGEN PEROXIDE 2.65 ML/100ML
LIQUID ORAL; TOPICAL
Qty: 90 TABLET | Refills: 0 | OUTPATIENT
Start: 2023-05-15

## 2023-05-21 RX ORDER — TRIAMCINOLONE ACETONIDE 0.25 MG/G
OINTMENT TOPICAL 2 TIMES DAILY
COMMUNITY

## 2023-05-21 RX ORDER — FERROUS SULFATE 325(65) MG
325 TABLET ORAL
COMMUNITY

## 2023-05-21 RX ORDER — CLOPIDOGREL BISULFATE 75 MG/1
TABLET ORAL
COMMUNITY

## 2023-05-21 RX ORDER — METOPROLOL SUCCINATE 50 MG/1
50 TABLET, EXTENDED RELEASE ORAL DAILY
COMMUNITY

## 2023-05-21 RX ORDER — ATORVASTATIN CALCIUM 80 MG/1
80 TABLET, FILM COATED ORAL DAILY
COMMUNITY
Start: 2022-12-14

## 2023-05-21 RX ORDER — GLIPIZIDE 5 MG/1
1 TABLET, FILM COATED, EXTENDED RELEASE ORAL DAILY
COMMUNITY
Start: 2023-03-27

## 2023-05-21 RX ORDER — PIOGLITAZONEHYDROCHLORIDE 30 MG/1
TABLET ORAL
COMMUNITY
Start: 2022-12-18

## 2023-05-21 RX ORDER — ASPIRIN 81 MG/1
81 TABLET, CHEWABLE ORAL DAILY
COMMUNITY
Start: 2021-05-22

## 2023-05-21 RX ORDER — AMLODIPINE BESYLATE 5 MG/1
5 TABLET ORAL DAILY
COMMUNITY
Start: 2023-01-23

## 2023-05-21 RX ORDER — LOSARTAN POTASSIUM 100 MG/1
1 TABLET ORAL DAILY
COMMUNITY
Start: 2022-12-18

## 2023-05-21 RX ORDER — PANTOPRAZOLE SODIUM 40 MG/1
40 TABLET, DELAYED RELEASE ORAL 2 TIMES DAILY
COMMUNITY
Start: 2021-06-02

## 2023-06-27 ENCOUNTER — OFFICE VISIT (OUTPATIENT)
Age: 68
End: 2023-06-27
Payer: MEDICARE

## 2023-06-27 VITALS
BODY MASS INDEX: 27.62 KG/M2 | RESPIRATION RATE: 17 BRPM | WEIGHT: 176 LBS | DIASTOLIC BLOOD PRESSURE: 74 MMHG | HEART RATE: 92 BPM | SYSTOLIC BLOOD PRESSURE: 140 MMHG | HEIGHT: 67 IN | OXYGEN SATURATION: 96 %

## 2023-06-27 DIAGNOSIS — H90.3 SENSORINEURAL HEARING LOSS (SNHL) OF BOTH EARS: Primary | ICD-10-CM

## 2023-06-27 DIAGNOSIS — H69.83 DYSFUNCTION OF BOTH EUSTACHIAN TUBES: ICD-10-CM

## 2023-06-27 DIAGNOSIS — H61.21 IMPACTED CERUMEN OF RIGHT EAR: ICD-10-CM

## 2023-06-27 PROCEDURE — 3077F SYST BP >= 140 MM HG: CPT | Performed by: OTOLARYNGOLOGY

## 2023-06-27 PROCEDURE — 3078F DIAST BP <80 MM HG: CPT | Performed by: OTOLARYNGOLOGY

## 2023-06-27 PROCEDURE — 1090F PRES/ABSN URINE INCON ASSESS: CPT | Performed by: OTOLARYNGOLOGY

## 2023-06-27 PROCEDURE — 69210 REMOVE IMPACTED EAR WAX UNI: CPT | Performed by: OTOLARYNGOLOGY

## 2023-06-27 PROCEDURE — G8419 CALC BMI OUT NRM PARAM NOF/U: HCPCS | Performed by: OTOLARYNGOLOGY

## 2023-06-27 PROCEDURE — 1036F TOBACCO NON-USER: CPT | Performed by: OTOLARYNGOLOGY

## 2023-06-27 PROCEDURE — 99203 OFFICE O/P NEW LOW 30 MIN: CPT | Performed by: OTOLARYNGOLOGY

## 2023-06-27 PROCEDURE — G8427 DOCREV CUR MEDS BY ELIG CLIN: HCPCS | Performed by: OTOLARYNGOLOGY

## 2023-06-27 PROCEDURE — 3017F COLORECTAL CA SCREEN DOC REV: CPT | Performed by: OTOLARYNGOLOGY

## 2023-06-27 PROCEDURE — 1123F ACP DISCUSS/DSCN MKR DOCD: CPT | Performed by: OTOLARYNGOLOGY

## 2023-06-27 PROCEDURE — G8400 PT W/DXA NO RESULTS DOC: HCPCS | Performed by: OTOLARYNGOLOGY

## 2023-06-27 RX ORDER — FLUTICASONE PROPIONATE 50 MCG
1 SPRAY, SUSPENSION (ML) NASAL DAILY
Qty: 32 G | Refills: 1 | Status: SHIPPED | OUTPATIENT
Start: 2023-06-27

## 2023-06-27 ASSESSMENT — ENCOUNTER SYMPTOMS
STRIDOR: 0
CHOKING: 0
PHOTOPHOBIA: 0
BACK PAIN: 0
SORE THROAT: 0
SHORTNESS OF BREATH: 0
WHEEZING: 0
COUGH: 0
SINUS PRESSURE: 0
VOICE CHANGE: 0
NAUSEA: 0
APNEA: 0
TROUBLE SWALLOWING: 0
EYE DISCHARGE: 0
EYE ITCHING: 0
SINUS PAIN: 0
VOMITING: 0
ABDOMINAL PAIN: 0

## 2023-07-10 LAB — HBA1C MFR BLD HPLC: 6.4 %

## 2023-09-28 ENCOUNTER — OFFICE VISIT (OUTPATIENT)
Age: 68
End: 2023-09-28
Payer: MEDICARE

## 2023-09-28 VITALS
BODY MASS INDEX: 27.47 KG/M2 | HEART RATE: 97 BPM | WEIGHT: 175 LBS | OXYGEN SATURATION: 98 % | SYSTOLIC BLOOD PRESSURE: 140 MMHG | DIASTOLIC BLOOD PRESSURE: 78 MMHG | HEIGHT: 67 IN

## 2023-09-28 DIAGNOSIS — H61.22 IMPACTED CERUMEN OF LEFT EAR: ICD-10-CM

## 2023-09-28 DIAGNOSIS — L29.9 EAR ITCHING: Primary | ICD-10-CM

## 2023-09-28 DIAGNOSIS — H91.93 BILATERAL HEARING LOSS, UNSPECIFIED HEARING LOSS TYPE: ICD-10-CM

## 2023-09-28 PROCEDURE — 99213 OFFICE O/P EST LOW 20 MIN: CPT | Performed by: STUDENT IN AN ORGANIZED HEALTH CARE EDUCATION/TRAINING PROGRAM

## 2023-09-28 PROCEDURE — 69210 REMOVE IMPACTED EAR WAX UNI: CPT | Performed by: STUDENT IN AN ORGANIZED HEALTH CARE EDUCATION/TRAINING PROGRAM

## 2023-09-28 PROCEDURE — 1123F ACP DISCUSS/DSCN MKR DOCD: CPT | Performed by: STUDENT IN AN ORGANIZED HEALTH CARE EDUCATION/TRAINING PROGRAM

## 2023-09-28 PROCEDURE — 3077F SYST BP >= 140 MM HG: CPT | Performed by: STUDENT IN AN ORGANIZED HEALTH CARE EDUCATION/TRAINING PROGRAM

## 2023-09-28 PROCEDURE — 3078F DIAST BP <80 MM HG: CPT | Performed by: STUDENT IN AN ORGANIZED HEALTH CARE EDUCATION/TRAINING PROGRAM

## 2023-09-28 ASSESSMENT — ENCOUNTER SYMPTOMS
CHOKING: 0
EYE ITCHING: 0
WHEEZING: 0
TROUBLE SWALLOWING: 0
VOMITING: 0
VOICE CHANGE: 0
STRIDOR: 0
SHORTNESS OF BREATH: 0
BACK PAIN: 0
PHOTOPHOBIA: 0
EYE DISCHARGE: 0
APNEA: 0
COUGH: 0
SINUS PRESSURE: 0
ABDOMINAL PAIN: 0
SINUS PAIN: 0
NAUSEA: 0
SORE THROAT: 0

## 2023-09-28 NOTE — PROGRESS NOTES
Subjective:    Elizabet Leon   70 y.o.   1955     New Patient Visit    History of Present Illness:    6/27/23 - Emp office    69 yo female with SNHL, has hearing aids for around 3 years. Feel like they are not helping as much now since the ears feel \"stopped up. \"  She does endorse some sinus congestion but does not take any meds for this    9/28/23:  Patient coming for routine follow-up. No acute significant issues. Patient reports bilateral ear itching which is bothersome. Uses hearing aids for her hearing loss. Nasal congestion sinus issues improved with Flonase. Review of Systems  Review of Systems   Constitutional:  Negative for appetite change, chills, fatigue and fever. HENT:  Negative for ear discharge, ear pain, nosebleeds, postnasal drip, sinus pressure, sinus pain, sneezing, sore throat, tinnitus, trouble swallowing and voice change. Itchy ears bilaterally   Eyes:  Negative for photophobia, discharge, itching and visual disturbance. Respiratory:  Negative for apnea, cough, choking, shortness of breath, wheezing and stridor. Cardiovascular:  Negative for chest pain and palpitations. Gastrointestinal:  Negative for abdominal pain, nausea and vomiting. Endocrine: Negative for cold intolerance and heat intolerance. Genitourinary:  Negative for difficulty urinating and dysuria. Musculoskeletal:  Negative for arthralgias, back pain, gait problem, myalgias, neck pain and neck stiffness. Skin:  Negative for rash and wound. Allergic/Immunologic: Negative for environmental allergies and food allergies. Neurological:  Negative for dizziness, speech difficulty, light-headedness and headaches. Hematological:  Negative for adenopathy. Does not bruise/bleed easily. Psychiatric/Behavioral:  Negative for confusion and sleep disturbance. The patient is not nervous/anxious.             Past Medical History:   Diagnosis Date    Benign essential hypertension     Compression

## 2024-01-01 NOTE — PROGRESS NOTES
TRANSFER - IN REPORT:    Verbal report received from Tonio Martinez RN(name) on Jc Hernandez  being received from [address] [place] [address](unit) for ordered procedure      Report consisted of patients Situation, Background, Assessment and   Recommendations(SBAR). Information from the following report(s) SBAR was reviewed with the receiving nurse. Opportunity for questions and clarification was provided. Assessment completed upon patients arrival to unit and care assumed. pump consult done Reinforced pumping guidelines, storage & handling of EHM. Provided mother with a cooler bag and reusable ice pack to transport expressed human milk to NICU from home.. Mom had been exclusively putting baby to breast for feeds until baby was transferred to NICU. Mom reports baby latched well feeding at both breasts and ending feedings by self. Mom said baby fed at least 8 times in 24 hours. Mom encouraged to pump at baby's bedside, skin to skin when baby is stable and direct feed when baby is stable./initiate/review safe skin-to-skin/initiate/review hand expression/initiate/review pumping guidelines and safe milk handling/initiate/review supplementation plan due to medical indications/review techniques to increase milk supply/review techniques to manage sore nipples/engorgement/reviewed components of an effective feeding and at least 8 effective feedings per day required/reviewed risks of unnecessary formula supplementation initiate/review safe skin-to-skin/post discharge community resources provided/reviewed components of an effective feeding and at least 8 effective feedings per day required/reviewed importance of monitoring infant diapers, the breastfeeding log, and minimum output each day/reviewed feeding on demand/by cue at least 8 times a day/recommended follow-up with pediatrician within 24 hours of discharge

## 2024-03-28 ENCOUNTER — OFFICE VISIT (OUTPATIENT)
Age: 69
End: 2024-03-28
Payer: MEDICARE

## 2024-03-28 VITALS — OXYGEN SATURATION: 96 % | SYSTOLIC BLOOD PRESSURE: 128 MMHG | HEART RATE: 97 BPM | DIASTOLIC BLOOD PRESSURE: 70 MMHG

## 2024-03-28 DIAGNOSIS — H65.91 FLUID LEVEL BEHIND TYMPANIC MEMBRANE OF RIGHT EAR: ICD-10-CM

## 2024-03-28 DIAGNOSIS — H69.93 DYSFUNCTION OF BOTH EUSTACHIAN TUBES: ICD-10-CM

## 2024-03-28 DIAGNOSIS — L29.9 EAR ITCHING: Primary | ICD-10-CM

## 2024-03-28 DIAGNOSIS — H90.3 SENSORINEURAL HEARING LOSS (SNHL) OF BOTH EARS: ICD-10-CM

## 2024-03-28 PROCEDURE — 1090F PRES/ABSN URINE INCON ASSESS: CPT | Performed by: STUDENT IN AN ORGANIZED HEALTH CARE EDUCATION/TRAINING PROGRAM

## 2024-03-28 PROCEDURE — G8400 PT W/DXA NO RESULTS DOC: HCPCS | Performed by: STUDENT IN AN ORGANIZED HEALTH CARE EDUCATION/TRAINING PROGRAM

## 2024-03-28 PROCEDURE — 1036F TOBACCO NON-USER: CPT | Performed by: STUDENT IN AN ORGANIZED HEALTH CARE EDUCATION/TRAINING PROGRAM

## 2024-03-28 PROCEDURE — 1123F ACP DISCUSS/DSCN MKR DOCD: CPT | Performed by: STUDENT IN AN ORGANIZED HEALTH CARE EDUCATION/TRAINING PROGRAM

## 2024-03-28 PROCEDURE — G8427 DOCREV CUR MEDS BY ELIG CLIN: HCPCS | Performed by: STUDENT IN AN ORGANIZED HEALTH CARE EDUCATION/TRAINING PROGRAM

## 2024-03-28 PROCEDURE — 3017F COLORECTAL CA SCREEN DOC REV: CPT | Performed by: STUDENT IN AN ORGANIZED HEALTH CARE EDUCATION/TRAINING PROGRAM

## 2024-03-28 PROCEDURE — 99214 OFFICE O/P EST MOD 30 MIN: CPT | Performed by: STUDENT IN AN ORGANIZED HEALTH CARE EDUCATION/TRAINING PROGRAM

## 2024-03-28 PROCEDURE — G8484 FLU IMMUNIZE NO ADMIN: HCPCS | Performed by: STUDENT IN AN ORGANIZED HEALTH CARE EDUCATION/TRAINING PROGRAM

## 2024-03-28 PROCEDURE — 3074F SYST BP LT 130 MM HG: CPT | Performed by: STUDENT IN AN ORGANIZED HEALTH CARE EDUCATION/TRAINING PROGRAM

## 2024-03-28 PROCEDURE — G8419 CALC BMI OUT NRM PARAM NOF/U: HCPCS | Performed by: STUDENT IN AN ORGANIZED HEALTH CARE EDUCATION/TRAINING PROGRAM

## 2024-03-28 PROCEDURE — 3078F DIAST BP <80 MM HG: CPT | Performed by: STUDENT IN AN ORGANIZED HEALTH CARE EDUCATION/TRAINING PROGRAM

## 2024-03-28 RX ORDER — PREDNISONE 10 MG/1
10 TABLET ORAL DAILY
Qty: 10 TABLET | Refills: 0 | Status: SHIPPED | OUTPATIENT
Start: 2024-03-28 | End: 2024-04-07

## 2024-03-28 ASSESSMENT — ENCOUNTER SYMPTOMS
WHEEZING: 0
SINUS PRESSURE: 0
NAUSEA: 0
SHORTNESS OF BREATH: 0
EYE DISCHARGE: 0
COUGH: 0
APNEA: 0
VOICE CHANGE: 0
TROUBLE SWALLOWING: 0
SINUS PAIN: 0
BACK PAIN: 0
CHOKING: 0
PHOTOPHOBIA: 0
VOMITING: 0
SORE THROAT: 0
EYE ITCHING: 0
STRIDOR: 0
ABDOMINAL PAIN: 0

## 2024-03-28 NOTE — PROGRESS NOTES
Subjective:    Rhonda White   69 y.o.   1955     New Patient Visit    History of Present Illness:    6/27/23 - Emp office    67 yo female with SNHL, has hearing aids for around 3 years.  Feel like they are not helping as much now since the ears feel \"stopped up.\"  She does endorse some sinus congestion but does not take any meds for this    9/28/23:  Patient coming for routine follow-up.  No acute significant issues.  Patient reports bilateral ear itching which is bothersome.  Uses hearing aids for her hearing loss.  Nasal congestion sinus issues improved with Flonase.    3/28/24:   Patient is now complaining of right-sided decrease in hearing and fullness for the last approximately 1 week.  Audiogram completed 2 months ago showed normal hearing.  Patient was recently started on Flonase which has not improved her ear symptoms yet    Review of Systems  Review of Systems   Constitutional:  Negative for appetite change, chills, fatigue and fever.   HENT:  Positive for hearing loss. Negative for ear discharge, ear pain, nosebleeds, postnasal drip, sinus pressure, sinus pain, sneezing, sore throat, tinnitus, trouble swallowing and voice change.         Itchy ears bilaterally   Eyes:  Negative for photophobia, discharge, itching and visual disturbance.   Respiratory:  Negative for apnea, cough, choking, shortness of breath, wheezing and stridor.    Cardiovascular:  Negative for chest pain and palpitations.   Gastrointestinal:  Negative for abdominal pain, nausea and vomiting.   Endocrine: Negative for cold intolerance and heat intolerance.   Genitourinary:  Negative for difficulty urinating and dysuria.   Musculoskeletal:  Negative for arthralgias, back pain, gait problem, myalgias, neck pain and neck stiffness.   Skin:  Negative for rash and wound.   Allergic/Immunologic: Negative for environmental allergies and food allergies.   Neurological:  Negative for dizziness, speech difficulty, light-headedness and

## 2024-04-12 ENCOUNTER — HOSPITAL ENCOUNTER (OUTPATIENT)
Facility: HOSPITAL | Age: 69
End: 2024-04-12
Payer: MEDICARE

## 2024-04-12 VITALS — HEIGHT: 67 IN | WEIGHT: 175 LBS | BODY MASS INDEX: 27.47 KG/M2

## 2024-04-12 DIAGNOSIS — Z12.31 BREAST CANCER SCREENING BY MAMMOGRAM: ICD-10-CM

## 2024-04-12 PROCEDURE — 77063 BREAST TOMOSYNTHESIS BI: CPT

## 2024-04-25 ENCOUNTER — OFFICE VISIT (OUTPATIENT)
Age: 69
End: 2024-04-25
Payer: MEDICARE

## 2024-04-25 VITALS
RESPIRATION RATE: 16 BRPM | BODY MASS INDEX: 27.47 KG/M2 | HEART RATE: 89 BPM | SYSTOLIC BLOOD PRESSURE: 118 MMHG | WEIGHT: 175 LBS | OXYGEN SATURATION: 96 % | DIASTOLIC BLOOD PRESSURE: 70 MMHG | HEIGHT: 67 IN

## 2024-04-25 DIAGNOSIS — H65.91 FLUID LEVEL BEHIND TYMPANIC MEMBRANE OF RIGHT EAR: Primary | ICD-10-CM

## 2024-04-25 DIAGNOSIS — H69.93 DYSFUNCTION OF BOTH EUSTACHIAN TUBES: ICD-10-CM

## 2024-04-25 PROCEDURE — 1036F TOBACCO NON-USER: CPT | Performed by: STUDENT IN AN ORGANIZED HEALTH CARE EDUCATION/TRAINING PROGRAM

## 2024-04-25 PROCEDURE — 1123F ACP DISCUSS/DSCN MKR DOCD: CPT | Performed by: STUDENT IN AN ORGANIZED HEALTH CARE EDUCATION/TRAINING PROGRAM

## 2024-04-25 PROCEDURE — G8400 PT W/DXA NO RESULTS DOC: HCPCS | Performed by: STUDENT IN AN ORGANIZED HEALTH CARE EDUCATION/TRAINING PROGRAM

## 2024-04-25 PROCEDURE — 99213 OFFICE O/P EST LOW 20 MIN: CPT | Performed by: STUDENT IN AN ORGANIZED HEALTH CARE EDUCATION/TRAINING PROGRAM

## 2024-04-25 PROCEDURE — 1090F PRES/ABSN URINE INCON ASSESS: CPT | Performed by: STUDENT IN AN ORGANIZED HEALTH CARE EDUCATION/TRAINING PROGRAM

## 2024-04-25 PROCEDURE — G8419 CALC BMI OUT NRM PARAM NOF/U: HCPCS | Performed by: STUDENT IN AN ORGANIZED HEALTH CARE EDUCATION/TRAINING PROGRAM

## 2024-04-25 PROCEDURE — 3074F SYST BP LT 130 MM HG: CPT | Performed by: STUDENT IN AN ORGANIZED HEALTH CARE EDUCATION/TRAINING PROGRAM

## 2024-04-25 PROCEDURE — 3078F DIAST BP <80 MM HG: CPT | Performed by: STUDENT IN AN ORGANIZED HEALTH CARE EDUCATION/TRAINING PROGRAM

## 2024-04-25 PROCEDURE — G8427 DOCREV CUR MEDS BY ELIG CLIN: HCPCS | Performed by: STUDENT IN AN ORGANIZED HEALTH CARE EDUCATION/TRAINING PROGRAM

## 2024-04-25 PROCEDURE — 3017F COLORECTAL CA SCREEN DOC REV: CPT | Performed by: STUDENT IN AN ORGANIZED HEALTH CARE EDUCATION/TRAINING PROGRAM

## 2024-04-25 PROCEDURE — 31231 NASAL ENDOSCOPY DX: CPT | Performed by: STUDENT IN AN ORGANIZED HEALTH CARE EDUCATION/TRAINING PROGRAM

## 2024-04-25 ASSESSMENT — ENCOUNTER SYMPTOMS
SINUS PRESSURE: 0
SORE THROAT: 0
PHOTOPHOBIA: 0
WHEEZING: 0
NAUSEA: 0
VOMITING: 0
STRIDOR: 0
TROUBLE SWALLOWING: 0
COUGH: 0
EYE DISCHARGE: 0
EYE ITCHING: 0
VOICE CHANGE: 0
SINUS PAIN: 0
CHOKING: 0
ABDOMINAL PAIN: 0
BACK PAIN: 0
APNEA: 0
SHORTNESS OF BREATH: 0

## 2024-04-25 NOTE — PROGRESS NOTES
aspiration next Wednesday, May 1 at 8:30 AM  - Risks, benefits, alternatives of bilateral myringotomy and tympanostomy tube placement discussed including bleeding, infection, need for repeat or revision procedure, retained tympanostomy tube, early tube extrusion, tube otorrhea, TM perforation, change in hearing, and anesthetic risk    -Scope exam did not show any obstructing nasopharyngeal mass lesion blocking eustachian tube.    No orders of the defined types were placed in this encounter.     Return in about 6 days (around 5/1/2024).     Thank you for referring this patient,    Soheila Altamirano MD, FACS  [facility]    [address]  [place] [id number] [address]  [place] [address]    O    [facility] [phone]

## 2024-04-26 ENCOUNTER — TELEPHONE (OUTPATIENT)
Age: 69
End: 2024-04-26

## 2024-04-26 NOTE — TELEPHONE ENCOUNTER
Attempted to reach pt to schedule myringotomy procedure at 8:30am on 05/01 per Dr. Altamirano and yingm asking pt to call back to schedule

## 2024-04-30 ENCOUNTER — TELEPHONE (OUTPATIENT)
Age: 69
End: 2024-04-30

## 2024-04-30 NOTE — TELEPHONE ENCOUNTER
Called pt 5 times today to verify if she has had her hearing test done in order to be seen by Physician and patients picks up call and hangs up.

## 2024-05-01 ENCOUNTER — PROCEDURE VISIT (OUTPATIENT)
Age: 69
End: 2024-05-01
Payer: MEDICARE

## 2024-05-01 VITALS
HEIGHT: 67 IN | SYSTOLIC BLOOD PRESSURE: 138 MMHG | BODY MASS INDEX: 27.47 KG/M2 | OXYGEN SATURATION: 98 % | HEART RATE: 85 BPM | WEIGHT: 175 LBS | DIASTOLIC BLOOD PRESSURE: 70 MMHG

## 2024-05-01 DIAGNOSIS — H65.91 FLUID LEVEL BEHIND TYMPANIC MEMBRANE OF RIGHT EAR: ICD-10-CM

## 2024-05-01 DIAGNOSIS — H69.93 DYSFUNCTION OF BOTH EUSTACHIAN TUBES: Primary | ICD-10-CM

## 2024-05-01 DIAGNOSIS — H90.3 SENSORINEURAL HEARING LOSS (SNHL) OF BOTH EARS: ICD-10-CM

## 2024-05-01 PROCEDURE — G8427 DOCREV CUR MEDS BY ELIG CLIN: HCPCS | Performed by: STUDENT IN AN ORGANIZED HEALTH CARE EDUCATION/TRAINING PROGRAM

## 2024-05-01 PROCEDURE — 3017F COLORECTAL CA SCREEN DOC REV: CPT | Performed by: STUDENT IN AN ORGANIZED HEALTH CARE EDUCATION/TRAINING PROGRAM

## 2024-05-01 PROCEDURE — 1123F ACP DISCUSS/DSCN MKR DOCD: CPT | Performed by: STUDENT IN AN ORGANIZED HEALTH CARE EDUCATION/TRAINING PROGRAM

## 2024-05-01 PROCEDURE — G8400 PT W/DXA NO RESULTS DOC: HCPCS | Performed by: STUDENT IN AN ORGANIZED HEALTH CARE EDUCATION/TRAINING PROGRAM

## 2024-05-01 PROCEDURE — 3078F DIAST BP <80 MM HG: CPT | Performed by: STUDENT IN AN ORGANIZED HEALTH CARE EDUCATION/TRAINING PROGRAM

## 2024-05-01 PROCEDURE — 92504 EAR MICROSCOPY EXAMINATION: CPT | Performed by: STUDENT IN AN ORGANIZED HEALTH CARE EDUCATION/TRAINING PROGRAM

## 2024-05-01 PROCEDURE — 1090F PRES/ABSN URINE INCON ASSESS: CPT | Performed by: STUDENT IN AN ORGANIZED HEALTH CARE EDUCATION/TRAINING PROGRAM

## 2024-05-01 PROCEDURE — 3075F SYST BP GE 130 - 139MM HG: CPT | Performed by: STUDENT IN AN ORGANIZED HEALTH CARE EDUCATION/TRAINING PROGRAM

## 2024-05-01 PROCEDURE — G8419 CALC BMI OUT NRM PARAM NOF/U: HCPCS | Performed by: STUDENT IN AN ORGANIZED HEALTH CARE EDUCATION/TRAINING PROGRAM

## 2024-05-01 PROCEDURE — 1036F TOBACCO NON-USER: CPT | Performed by: STUDENT IN AN ORGANIZED HEALTH CARE EDUCATION/TRAINING PROGRAM

## 2024-05-01 PROCEDURE — 99212 OFFICE O/P EST SF 10 MIN: CPT | Performed by: STUDENT IN AN ORGANIZED HEALTH CARE EDUCATION/TRAINING PROGRAM

## 2024-05-01 ASSESSMENT — ENCOUNTER SYMPTOMS
SHORTNESS OF BREATH: 0
TROUBLE SWALLOWING: 0
EYE DISCHARGE: 0
SINUS PAIN: 0
VOMITING: 0
EYE ITCHING: 0
PHOTOPHOBIA: 0
BACK PAIN: 0
WHEEZING: 0
SINUS PRESSURE: 0
STRIDOR: 0
CHOKING: 0
NAUSEA: 0
COUGH: 0
APNEA: 0
ABDOMINAL PAIN: 0
SORE THROAT: 0
VOICE CHANGE: 0

## 2024-05-01 NOTE — PROGRESS NOTES
Subjective:    Rhonda White   69 y.o.   1955     New Patient Visit    History of Present Illness:    6/27/23 - Emp office    69 yo female with SNHL, has hearing aids for around 3 years.  Feel like they are not helping as much now since the ears feel \"stopped up.\"  She does endorse some sinus congestion but does not take any meds for this    9/28/23:  Patient coming for routine follow-up.  No acute significant issues.  Patient reports bilateral ear itching which is bothersome.  Uses hearing aids for her hearing loss.  Nasal congestion sinus issues improved with Flonase.    3/28/24:   Patient is now complaining of right-sided decrease in hearing and fullness for the last approximately 1 week.  Audiogram completed 2 months ago showed normal hearing.  Patient was recently started on Flonase which has not improved her ear symptoms yet    4/25/24:   Patient is now status post Flonase and steroids, some improvement in ear fullness, but hearing loss is persistent but sometimes intermittent.  On exam today middle ear effusion was noted to be persistent    5/1/24:   Patient's symptoms are unchanged.  His scheduled for procedure visit today, however she deferred myringotomy and aspiration or ear tubes.  Will continue Flonase and reassess in 1 month    Review of Systems  Review of Systems   Constitutional:  Negative for appetite change, chills, fatigue and fever.   HENT:  Positive for hearing loss. Negative for ear discharge, ear pain, nosebleeds, postnasal drip, sinus pressure, sinus pain, sneezing, sore throat, tinnitus, trouble swallowing and voice change.         Itchy ears bilaterally   Eyes:  Negative for photophobia, discharge, itching and visual disturbance.   Respiratory:  Negative for apnea, cough, choking, shortness of breath, wheezing and stridor.    Cardiovascular:  Negative for chest pain and palpitations.   Gastrointestinal:  Negative for abdominal pain, nausea and vomiting.   Endocrine: Negative for

## 2024-06-27 ENCOUNTER — OFFICE VISIT (OUTPATIENT)
Age: 69
End: 2024-06-27
Payer: MEDICARE

## 2024-06-27 VITALS — SYSTOLIC BLOOD PRESSURE: 138 MMHG | DIASTOLIC BLOOD PRESSURE: 80 MMHG | OXYGEN SATURATION: 98 % | HEART RATE: 75 BPM

## 2024-06-27 DIAGNOSIS — H61.22 IMPACTED CERUMEN OF LEFT EAR: ICD-10-CM

## 2024-06-27 DIAGNOSIS — H65.91 FLUID LEVEL BEHIND TYMPANIC MEMBRANE OF RIGHT EAR: ICD-10-CM

## 2024-06-27 DIAGNOSIS — H90.3 SENSORINEURAL HEARING LOSS (SNHL) OF BOTH EARS: ICD-10-CM

## 2024-06-27 DIAGNOSIS — H69.93 DYSFUNCTION OF BOTH EUSTACHIAN TUBES: Primary | ICD-10-CM

## 2024-06-27 PROCEDURE — 69210 REMOVE IMPACTED EAR WAX UNI: CPT | Performed by: STUDENT IN AN ORGANIZED HEALTH CARE EDUCATION/TRAINING PROGRAM

## 2024-06-27 PROCEDURE — 1036F TOBACCO NON-USER: CPT | Performed by: STUDENT IN AN ORGANIZED HEALTH CARE EDUCATION/TRAINING PROGRAM

## 2024-06-27 PROCEDURE — 3079F DIAST BP 80-89 MM HG: CPT | Performed by: STUDENT IN AN ORGANIZED HEALTH CARE EDUCATION/TRAINING PROGRAM

## 2024-06-27 PROCEDURE — 1090F PRES/ABSN URINE INCON ASSESS: CPT | Performed by: STUDENT IN AN ORGANIZED HEALTH CARE EDUCATION/TRAINING PROGRAM

## 2024-06-27 PROCEDURE — G8419 CALC BMI OUT NRM PARAM NOF/U: HCPCS | Performed by: STUDENT IN AN ORGANIZED HEALTH CARE EDUCATION/TRAINING PROGRAM

## 2024-06-27 PROCEDURE — 3017F COLORECTAL CA SCREEN DOC REV: CPT | Performed by: STUDENT IN AN ORGANIZED HEALTH CARE EDUCATION/TRAINING PROGRAM

## 2024-06-27 PROCEDURE — G8400 PT W/DXA NO RESULTS DOC: HCPCS | Performed by: STUDENT IN AN ORGANIZED HEALTH CARE EDUCATION/TRAINING PROGRAM

## 2024-06-27 PROCEDURE — 99212 OFFICE O/P EST SF 10 MIN: CPT | Performed by: STUDENT IN AN ORGANIZED HEALTH CARE EDUCATION/TRAINING PROGRAM

## 2024-06-27 PROCEDURE — 1123F ACP DISCUSS/DSCN MKR DOCD: CPT | Performed by: STUDENT IN AN ORGANIZED HEALTH CARE EDUCATION/TRAINING PROGRAM

## 2024-06-27 PROCEDURE — 3075F SYST BP GE 130 - 139MM HG: CPT | Performed by: STUDENT IN AN ORGANIZED HEALTH CARE EDUCATION/TRAINING PROGRAM

## 2024-06-27 PROCEDURE — G8427 DOCREV CUR MEDS BY ELIG CLIN: HCPCS | Performed by: STUDENT IN AN ORGANIZED HEALTH CARE EDUCATION/TRAINING PROGRAM

## 2024-06-27 ASSESSMENT — ENCOUNTER SYMPTOMS
TROUBLE SWALLOWING: 0
VOICE CHANGE: 0
COUGH: 0
WHEEZING: 0
EYE ITCHING: 0
STRIDOR: 0
EYE DISCHARGE: 0
PHOTOPHOBIA: 0
BACK PAIN: 0
SINUS PAIN: 0
SORE THROAT: 0
SHORTNESS OF BREATH: 0
SINUS PRESSURE: 0
CHOKING: 0
APNEA: 0
VOMITING: 0
NAUSEA: 0
ABDOMINAL PAIN: 0

## 2024-06-27 NOTE — PROGRESS NOTES
Subjective:    Rhonda White   69 y.o.   1955     New Patient Visit    History of Present Illness:    6/27/23 - Emp office    67 yo female with SNHL, has hearing aids for around 3 years.  Feel like they are not helping as much now since the ears feel \"stopped up.\"  She does endorse some sinus congestion but does not take any meds for this    9/28/23:  Patient coming for routine follow-up.  No acute significant issues.  Patient reports bilateral ear itching which is bothersome.  Uses hearing aids for her hearing loss.  Nasal congestion sinus issues improved with Flonase.    3/28/24:   Patient is now complaining of right-sided decrease in hearing and fullness for the last approximately 1 week.  Audiogram completed 2 months ago showed normal hearing.  Patient was recently started on Flonase which has not improved her ear symptoms yet    4/25/24:   Patient is now status post Flonase and steroids, some improvement in ear fullness, but hearing loss is persistent but sometimes intermittent.  On exam today middle ear effusion was noted to be persistent    5/1/24:   Patient's symptoms are unchanged.  His scheduled for procedure visit today, however she deferred myringotomy and aspiration or ear tubes.  Will continue Flonase and reassess in 1 month    6/27/24:  Patient's symptoms are improved from prior.  Has been using Flonase persistently.  No other symptoms at this time.  Exam shows left-sided cerumen impaction and interval improvement of serous effusion in the right ear    Review of Systems  Review of Systems   Constitutional:  Negative for appetite change, chills, fatigue and fever.   HENT:  Positive for hearing loss. Negative for ear discharge, ear pain, nosebleeds, postnasal drip, sinus pressure, sinus pain, sneezing, sore throat, tinnitus, trouble swallowing and voice change.         Itchy ears bilaterally   Eyes:  Negative for photophobia, discharge, itching and visual disturbance.   Respiratory:  Negative

## 2024-10-10 ENCOUNTER — APPOINTMENT (OUTPATIENT)
Facility: HOSPITAL | Age: 69
End: 2024-10-10
Attending: EMERGENCY MEDICINE
Payer: MEDICARE

## 2024-10-10 ENCOUNTER — HOSPITAL ENCOUNTER (EMERGENCY)
Facility: HOSPITAL | Age: 69
Discharge: HOME | End: 2024-10-10
Attending: EMERGENCY MEDICINE
Payer: MEDICARE

## 2024-10-10 VITALS
TEMPERATURE: 98.9 F | SYSTOLIC BLOOD PRESSURE: 158 MMHG | HEART RATE: 85 BPM | HEIGHT: 67 IN | WEIGHT: 175 LBS | OXYGEN SATURATION: 98 % | RESPIRATION RATE: 16 BRPM | DIASTOLIC BLOOD PRESSURE: 75 MMHG | BODY MASS INDEX: 27.47 KG/M2

## 2024-10-10 DIAGNOSIS — S80.02XA CONTUSION OF LEFT KNEE, INITIAL ENCOUNTER: Primary | ICD-10-CM

## 2024-10-10 PROCEDURE — 99284 EMERGENCY DEPT VISIT MOD MDM: CPT

## 2024-10-10 PROCEDURE — 2580000003 HC RX 258: Performed by: EMERGENCY MEDICINE

## 2024-10-10 PROCEDURE — 6370000000 HC RX 637 (ALT 250 FOR IP): Performed by: EMERGENCY MEDICINE

## 2024-10-10 PROCEDURE — 96372 THER/PROPH/DIAG INJ SC/IM: CPT

## 2024-10-10 PROCEDURE — 73562 X-RAY EXAM OF KNEE 3: CPT

## 2024-10-10 PROCEDURE — 6360000002 HC RX W HCPCS: Performed by: EMERGENCY MEDICINE

## 2024-10-10 RX ORDER — PREDNISONE 20 MG/1
20 TABLET ORAL DAILY
Qty: 5 TABLET | Refills: 0 | Status: SHIPPED | OUTPATIENT
Start: 2024-10-10 | End: 2024-10-15

## 2024-10-10 RX ORDER — IBUPROFEN 600 MG/1
600 TABLET, FILM COATED ORAL 4 TIMES DAILY PRN
Qty: 20 TABLET | Refills: 0 | Status: SHIPPED | OUTPATIENT
Start: 2024-10-10 | End: 2024-10-15

## 2024-10-10 RX ORDER — IBUPROFEN 600 MG/1
600 TABLET, FILM COATED ORAL
Status: COMPLETED | OUTPATIENT
Start: 2024-10-10 | End: 2024-10-10

## 2024-10-10 RX ADMIN — WATER 125 MG: 1 INJECTION INTRAMUSCULAR; INTRAVENOUS; SUBCUTANEOUS at 11:33

## 2024-10-10 RX ADMIN — IBUPROFEN 600 MG: 600 TABLET, FILM COATED ORAL at 10:24

## 2024-10-10 ASSESSMENT — PAIN DESCRIPTION - FREQUENCY: FREQUENCY: CONTINUOUS

## 2024-10-10 ASSESSMENT — PAIN - FUNCTIONAL ASSESSMENT
PAIN_FUNCTIONAL_ASSESSMENT: 0-10
PAIN_FUNCTIONAL_ASSESSMENT: 0-10
PAIN_FUNCTIONAL_ASSESSMENT: PREVENTS OR INTERFERES SOME ACTIVE ACTIVITIES AND ADLS

## 2024-10-10 ASSESSMENT — PAIN DESCRIPTION - ORIENTATION: ORIENTATION: LEFT

## 2024-10-10 ASSESSMENT — PAIN DESCRIPTION - PAIN TYPE: TYPE: ACUTE PAIN

## 2024-10-10 ASSESSMENT — PAIN SCALES - GENERAL
PAINLEVEL_OUTOF10: 6
PAINLEVEL_OUTOF10: 6
PAINLEVEL_OUTOF10: 5

## 2024-10-10 ASSESSMENT — PAIN DESCRIPTION - ONSET: ONSET: ON-GOING

## 2024-10-10 ASSESSMENT — PAIN DESCRIPTION - DESCRIPTORS: DESCRIPTORS: ACHING

## 2024-10-10 ASSESSMENT — PAIN DESCRIPTION - LOCATION: LOCATION: KNEE

## 2024-10-10 NOTE — ED PROVIDER NOTES
[facility] EMERGENCY DEPT  EMERGENCY DEPARTMENT HISTORY AND PHYSICAL EXAM      Date: 10/10/2024  Patient Name: Rhonda White  MRN: [id number]  YOB: 1955  Date of evaluation: 10/10/2024  Provider: Janet Salazar MD   Note Started: 10:11 AM EDT 10/10/24    HISTORY OF PRESENT ILLNESS     Chief Complaint   Patient presents with    Leg Pain       History Provided By: Patient    HPI: Rhonda White is a 69 y.o. female     PAST MEDICAL HISTORY   Past Medical History:  Past Medical History:   Diagnosis Date    Benign essential hypertension     Compression fracture of L1 vertebra with routine healing     Diabetes (HCC)     Elevated alkaline phosphatase level     Elevated serum creatinine     Gastric polyp     Hearing loss sensory, bilateral     History of heart artery stent     Hypercholesterolemia     Hypertension     Ill-defined condition     GI bleed    Leukopenia, unspecified type     Mild anemia     Myocardial infarction (HCC)     May 2021    Osteopenia of left hip     STEMI (ST elevation myocardial infarction) (HCC)     Jan 13, 2022    STEMI involving right coronary artery (HCC)     Urethral caruncle        Past Surgical History:  Past Surgical History:   Procedure Laterality Date    CARDIAC CATHETERIZATION      2 stents (may 2021, jan 2022)    COLONOSCOPY N/A 9/30/2022    COLONOSCOPY performed by Trevor Larios MD at [facility] ENDOSCOPY    GYN      vaginal deliveries x2    HYSTERECTOMY (CERVIX STATUS UNKNOWN)      partial    OTHER SURGICAL HISTORY      egd- gastric polyp    OH UNLISTED PROCEDURE ABDOMEN PERITONEUM & OMENTUM         Family History:  Family History   Problem Relation Age of Onset    No Known Problems Sister     Diabetes Sister     Diabetes Sister     Diabetes Sister     Diabetes Sister     Dementia Brother     Diabetes Sister     Hypertension Sister     Cancer Sister     Alcohol Abuse Father     Cancer Brother     Heart Disease Brother     Diabetes Brother     Kidney Disease Mother

## 2024-10-10 NOTE — ED TRIAGE NOTES
PT reports fall yesterday reports she fell onto left knee/leg and is now having pain that started this morning. Pt ambulatory

## 2024-12-26 ENCOUNTER — OFFICE VISIT (OUTPATIENT)
Age: 69
End: 2024-12-26
Payer: MEDICARE

## 2024-12-26 VITALS
OXYGEN SATURATION: 93 % | HEART RATE: 100 BPM | DIASTOLIC BLOOD PRESSURE: 80 MMHG | WEIGHT: 175 LBS | RESPIRATION RATE: 18 BRPM | BODY MASS INDEX: 27.47 KG/M2 | HEIGHT: 67 IN | SYSTOLIC BLOOD PRESSURE: 138 MMHG

## 2024-12-26 DIAGNOSIS — H65.91 FLUID LEVEL BEHIND TYMPANIC MEMBRANE OF RIGHT EAR: ICD-10-CM

## 2024-12-26 DIAGNOSIS — H91.93 BILATERAL HEARING LOSS, UNSPECIFIED HEARING LOSS TYPE: Primary | ICD-10-CM

## 2024-12-26 DIAGNOSIS — H61.23 BILATERAL IMPACTED CERUMEN: ICD-10-CM

## 2024-12-26 PROCEDURE — 3017F COLORECTAL CA SCREEN DOC REV: CPT | Performed by: STUDENT IN AN ORGANIZED HEALTH CARE EDUCATION/TRAINING PROGRAM

## 2024-12-26 PROCEDURE — 3079F DIAST BP 80-89 MM HG: CPT | Performed by: STUDENT IN AN ORGANIZED HEALTH CARE EDUCATION/TRAINING PROGRAM

## 2024-12-26 PROCEDURE — 99214 OFFICE O/P EST MOD 30 MIN: CPT | Performed by: STUDENT IN AN ORGANIZED HEALTH CARE EDUCATION/TRAINING PROGRAM

## 2024-12-26 PROCEDURE — 1159F MED LIST DOCD IN RCRD: CPT | Performed by: STUDENT IN AN ORGANIZED HEALTH CARE EDUCATION/TRAINING PROGRAM

## 2024-12-26 PROCEDURE — 1036F TOBACCO NON-USER: CPT | Performed by: STUDENT IN AN ORGANIZED HEALTH CARE EDUCATION/TRAINING PROGRAM

## 2024-12-26 PROCEDURE — 3075F SYST BP GE 130 - 139MM HG: CPT | Performed by: STUDENT IN AN ORGANIZED HEALTH CARE EDUCATION/TRAINING PROGRAM

## 2024-12-26 PROCEDURE — 1090F PRES/ABSN URINE INCON ASSESS: CPT | Performed by: STUDENT IN AN ORGANIZED HEALTH CARE EDUCATION/TRAINING PROGRAM

## 2024-12-26 PROCEDURE — G8419 CALC BMI OUT NRM PARAM NOF/U: HCPCS | Performed by: STUDENT IN AN ORGANIZED HEALTH CARE EDUCATION/TRAINING PROGRAM

## 2024-12-26 PROCEDURE — G8400 PT W/DXA NO RESULTS DOC: HCPCS | Performed by: STUDENT IN AN ORGANIZED HEALTH CARE EDUCATION/TRAINING PROGRAM

## 2024-12-26 PROCEDURE — G8484 FLU IMMUNIZE NO ADMIN: HCPCS | Performed by: STUDENT IN AN ORGANIZED HEALTH CARE EDUCATION/TRAINING PROGRAM

## 2024-12-26 PROCEDURE — 1123F ACP DISCUSS/DSCN MKR DOCD: CPT | Performed by: STUDENT IN AN ORGANIZED HEALTH CARE EDUCATION/TRAINING PROGRAM

## 2024-12-26 PROCEDURE — G8427 DOCREV CUR MEDS BY ELIG CLIN: HCPCS | Performed by: STUDENT IN AN ORGANIZED HEALTH CARE EDUCATION/TRAINING PROGRAM

## 2024-12-26 PROCEDURE — 69210 REMOVE IMPACTED EAR WAX UNI: CPT | Performed by: STUDENT IN AN ORGANIZED HEALTH CARE EDUCATION/TRAINING PROGRAM

## 2024-12-26 ASSESSMENT — ENCOUNTER SYMPTOMS
VOMITING: 0
BACK PAIN: 0
CHOKING: 0
APNEA: 0
EYE ITCHING: 0
STRIDOR: 0
SINUS PRESSURE: 0
SINUS PAIN: 0
WHEEZING: 0
NAUSEA: 0
SORE THROAT: 0
TROUBLE SWALLOWING: 0
PHOTOPHOBIA: 0
ABDOMINAL PAIN: 0
VOICE CHANGE: 0
COUGH: 0
EYE DISCHARGE: 0
SHORTNESS OF BREATH: 0

## 2024-12-26 NOTE — PROGRESS NOTES
Subjective:    Rhonda White   69 y.o.   1955     New Patient Visit    History of Present Illness:    6/27/23 - Emp office    69 yo female with SNHL, has hearing aids for around 3 years.  Feel like they are not helping as much now since the ears feel \"stopped up.\"  She does endorse some sinus congestion but does not take any meds for this    9/28/23:  Patient coming for routine follow-up.  No acute significant issues.  Patient reports bilateral ear itching which is bothersome.  Uses hearing aids for her hearing loss.  Nasal congestion sinus issues improved with Flonase.    3/28/24:   Patient is now complaining of right-sided decrease in hearing and fullness for the last approximately 1 week.  Audiogram completed 2 months ago showed normal hearing.  Patient was recently started on Flonase which has not improved her ear symptoms yet    4/25/24:   Patient is now status post Flonase and steroids, some improvement in ear fullness, but hearing loss is persistent but sometimes intermittent.  On exam today middle ear effusion was noted to be persistent    5/1/24:   Patient's symptoms are unchanged.  His scheduled for procedure visit today, however she deferred myringotomy and aspiration or ear tubes.  Will continue Flonase and reassess in 1 month    6/27/24:  Patient's symptoms are improved from prior.  Has been using Flonase persistently.  No other symptoms at this time.  Exam shows left-sided cerumen impaction and interval improvement of serous effusion in the right ear    12/26/24:   Patient's middle ear effusion has now recurred.  Having worse hearing loss on the right ear again.  Bilateral cerumen impaction noted again and cleared.    Review of Systems  Review of Systems   Constitutional:  Negative for appetite change, chills, fatigue and fever.   HENT:  Positive for hearing loss. Negative for ear discharge, ear pain, nosebleeds, postnasal drip, sinus pressure, sinus pain, sneezing, sore throat, tinnitus,

## 2025-03-24 ENCOUNTER — TRANSCRIBE ORDERS (OUTPATIENT)
Facility: HOSPITAL | Age: 70
End: 2025-03-24

## 2025-03-24 DIAGNOSIS — Z12.31 OTHER SCREENING MAMMOGRAM: Primary | ICD-10-CM

## 2025-04-22 ENCOUNTER — HOSPITAL ENCOUNTER (OUTPATIENT)
Facility: HOSPITAL | Age: 70
Discharge: HOME | End: 2025-04-25
Payer: MEDICARE

## 2025-04-22 DIAGNOSIS — Z12.31 OTHER SCREENING MAMMOGRAM: ICD-10-CM

## 2025-04-22 PROCEDURE — 77063 BREAST TOMOSYNTHESIS BI: CPT

## 2025-06-02 ENCOUNTER — OFFICE VISIT (OUTPATIENT)
Age: 70
End: 2025-06-02
Payer: MEDICARE

## 2025-06-02 VITALS
HEIGHT: 67 IN | BODY MASS INDEX: 27.15 KG/M2 | OXYGEN SATURATION: 98 % | RESPIRATION RATE: 18 BRPM | WEIGHT: 173 LBS | HEART RATE: 83 BPM | SYSTOLIC BLOOD PRESSURE: 123 MMHG | DIASTOLIC BLOOD PRESSURE: 67 MMHG | TEMPERATURE: 97.6 F

## 2025-06-02 DIAGNOSIS — K92.2 UPPER GI BLEED: ICD-10-CM

## 2025-06-02 DIAGNOSIS — K31.7 GASTRIC POLYP: Primary | ICD-10-CM

## 2025-06-02 DIAGNOSIS — K21.00 GASTROESOPHAGEAL REFLUX DISEASE WITH ESOPHAGITIS WITHOUT HEMORRHAGE: ICD-10-CM

## 2025-06-02 PROCEDURE — 1036F TOBACCO NON-USER: CPT | Performed by: INTERNAL MEDICINE

## 2025-06-02 PROCEDURE — G8419 CALC BMI OUT NRM PARAM NOF/U: HCPCS | Performed by: INTERNAL MEDICINE

## 2025-06-02 PROCEDURE — 1159F MED LIST DOCD IN RCRD: CPT | Performed by: INTERNAL MEDICINE

## 2025-06-02 PROCEDURE — 1123F ACP DISCUSS/DSCN MKR DOCD: CPT | Performed by: INTERNAL MEDICINE

## 2025-06-02 PROCEDURE — 3074F SYST BP LT 130 MM HG: CPT | Performed by: INTERNAL MEDICINE

## 2025-06-02 PROCEDURE — 1160F RVW MEDS BY RX/DR IN RCRD: CPT | Performed by: INTERNAL MEDICINE

## 2025-06-02 PROCEDURE — G8400 PT W/DXA NO RESULTS DOC: HCPCS | Performed by: INTERNAL MEDICINE

## 2025-06-02 PROCEDURE — 3078F DIAST BP <80 MM HG: CPT | Performed by: INTERNAL MEDICINE

## 2025-06-02 PROCEDURE — 1090F PRES/ABSN URINE INCON ASSESS: CPT | Performed by: INTERNAL MEDICINE

## 2025-06-02 PROCEDURE — G8427 DOCREV CUR MEDS BY ELIG CLIN: HCPCS | Performed by: INTERNAL MEDICINE

## 2025-06-02 PROCEDURE — 99204 OFFICE O/P NEW MOD 45 MIN: CPT | Performed by: INTERNAL MEDICINE

## 2025-06-02 PROCEDURE — 3017F COLORECTAL CA SCREEN DOC REV: CPT | Performed by: INTERNAL MEDICINE

## 2025-06-02 ASSESSMENT — ENCOUNTER SYMPTOMS
NAUSEA: 0
ANAL BLEEDING: 0
VOMITING: 0
RECTAL PAIN: 0
BACK PAIN: 1
ABDOMINAL DISTENTION: 0
BLOOD IN STOOL: 0
CONSTIPATION: 1
DIARRHEA: 0
ABDOMINAL PAIN: 1
ALLERGIC/IMMUNOLOGIC NEGATIVE: 1
RESPIRATORY NEGATIVE: 1

## 2025-06-02 NOTE — PROGRESS NOTES
Chief Complaint   Patient presents with    New Patient    Gastroesophageal Reflux     Have you been to the ER, urgent care clinic since your last visit?  Hospitalized since your last visit?   NO    Have you seen or consulted any other health care providers outside our system since your last visit?   NO      “Have you had a diabetic eye exam?”    NO     Date of last diabetic eye exam: 4/17/2023

## 2025-06-02 NOTE — PROGRESS NOTES
Rhonda White is a 70 y.o. female who presents today for the following:  Chief Complaint   Patient presents with    New Patient    Gastroesophageal Reflux         Allergies   Allergen Reactions    Azithromycin Itching       Current Outpatient Medications   Medication Sig Dispense Refill    aspirin 81 MG chewable tablet Take 1 tablet by mouth daily      atorvastatin (LIPITOR) 80 MG tablet Take 1 tablet by mouth daily      calcium carbonate 1500 (600 Ca) MG TABS tablet Take 1 tablet by mouth daily      vitamin D (CHOLECALCIFEROL) 25 MCG (1000 UT) TABS tablet Take 1 tablet by mouth daily      clopidogrel (PLAVIX) 75 MG tablet Take by mouth      glipiZIDE (GLUCOTROL XL) 5 MG extended release tablet Take 1 tablet by mouth daily      losartan (COZAAR) 100 MG tablet Take 1 tablet by mouth daily      pioglitazone (ACTOS) 30 MG tablet TAKE 1 TABLET BY MOUTH ONCE DAILY WITH  DINNER      ibuprofen (ADVIL;MOTRIN) 600 MG tablet Take 1 tablet by mouth 4 times daily as needed for Pain 20 tablet 0     No current facility-administered medications for this visit.       Past Medical History:   Diagnosis Date    Benign essential hypertension     Compression fracture of L1 vertebra with routine healing     Diabetes (HCC)     Elevated alkaline phosphatase level     Elevated serum creatinine     Gastric polyp     Hearing loss sensory, bilateral     History of heart artery stent     Hypercholesterolemia     Hypertension     Ill-defined condition     GI bleed    Leukopenia, unspecified type     Mild anemia     Myocardial infarction (HCC)     May 2021    Osteopenia of left hip     STEMI (ST elevation myocardial infarction) (HCC)     Jan 13, 2022    STEMI involving right coronary artery ([facility])     Urethral caruncle        Past Surgical History:   Procedure Laterality Date    CARDIAC CATHETERIZATION      2 stents (may 2021, jan 2022)    COLONOSCOPY N/A 9/30/2022    COLONOSCOPY performed by Trevor Larios MD at [facility] ENDOSCOPY    GYN

## 2025-06-04 ENCOUNTER — PREP FOR PROCEDURE (OUTPATIENT)
Age: 70
End: 2025-06-04

## 2025-06-04 DIAGNOSIS — K92.2 ACUTE GASTROINTESTINAL HEMORRHAGE: ICD-10-CM

## 2025-06-06 ENCOUNTER — TELEPHONE (OUTPATIENT)
Age: 70
End: 2025-06-06

## 2025-06-27 ENCOUNTER — TELEPHONE (OUTPATIENT)
Age: 70
End: 2025-06-27

## 2025-06-27 NOTE — TELEPHONE ENCOUNTER
Patient called about stopping her Plavix has a procedure on 7/3/2025. Please check on clearance. Pt wants a callback

## 2025-06-27 NOTE — TELEPHONE ENCOUNTER
Spoke with [facility] regarding the cardiac clearance. They states that the patient is not clear until after she does an ECHO and that is scheduled for 7/20. I spoke with the patient and she stated that she already knew that therefor she decided to cancel the procedure until after she is cleared and to currently continue all medications as scheduled.  Request sent to schedulers to cancel procedure.

## 2025-08-04 ENCOUNTER — APPOINTMENT (OUTPATIENT)
Facility: HOSPITAL | Age: 70
End: 2025-08-04
Payer: MEDICARE

## 2025-08-04 ENCOUNTER — HOSPITAL ENCOUNTER (EMERGENCY)
Facility: HOSPITAL | Age: 70
Discharge: HOME | End: 2025-08-04
Attending: EMERGENCY MEDICINE
Payer: MEDICARE

## 2025-08-04 VITALS
HEIGHT: 67 IN | BODY MASS INDEX: 29.19 KG/M2 | RESPIRATION RATE: 21 BRPM | WEIGHT: 186 LBS | TEMPERATURE: 97 F | HEART RATE: 63 BPM | OXYGEN SATURATION: 98 % | SYSTOLIC BLOOD PRESSURE: 136 MMHG | DIASTOLIC BLOOD PRESSURE: 85 MMHG

## 2025-08-04 DIAGNOSIS — R55 SYNCOPE AND COLLAPSE: Primary | ICD-10-CM

## 2025-08-04 DIAGNOSIS — I50.1 PULMONARY EDEMA CARDIAC CAUSE (HCC): ICD-10-CM

## 2025-08-04 LAB
ALBUMIN SERPL-MCNC: 3 G/DL (ref 3.5–5)
ALBUMIN/GLOB SERPL: 0.8 (ref 1.1–2.2)
ALP SERPL-CCNC: 127 U/L (ref 45–117)
ALT SERPL-CCNC: 15 U/L (ref 12–78)
ANION GAP SERPL CALC-SCNC: 6 MMOL/L (ref 2–12)
APPEARANCE UR: CLEAR
AST SERPL W P-5'-P-CCNC: 17 U/L (ref 15–37)
BACTERIA URNS QL MICRO: NEGATIVE /HPF
BASOPHILS # BLD: 0.02 K/UL (ref 0–0.1)
BASOPHILS NFR BLD: 0.5 % (ref 0–1)
BILIRUB SERPL-MCNC: 0.4 MG/DL (ref 0.2–1)
BILIRUB UR QL: NEGATIVE
BNP SERPL-MCNC: 153 PG/ML
BUN SERPL-MCNC: 16 MG/DL (ref 6–20)
BUN/CREAT SERPL: 16 (ref 12–20)
CA-I BLD-MCNC: 8.8 MG/DL (ref 8.5–10.1)
CHLORIDE SERPL-SCNC: 105 MMOL/L (ref 97–108)
CO2 SERPL-SCNC: 29 MMOL/L (ref 21–32)
COLOR UR: ABNORMAL
CREAT SERPL-MCNC: 0.98 MG/DL (ref 0.55–1.02)
DIFFERENTIAL METHOD BLD: ABNORMAL
EOSINOPHIL # BLD: 0.12 K/UL (ref 0–0.4)
EOSINOPHIL NFR BLD: 3.2 % (ref 0–7)
ERYTHROCYTE [DISTWIDTH] IN BLOOD BY AUTOMATED COUNT: 13.9 % (ref 11.5–14.5)
GLOBULIN SER CALC-MCNC: 3.8 G/DL (ref 2–4)
GLUCOSE SERPL-MCNC: 121 MG/DL (ref 65–100)
GLUCOSE UR STRIP.AUTO-MCNC: NEGATIVE MG/DL
HCT VFR BLD AUTO: 32.1 % (ref 35–47)
HGB BLD-MCNC: 10.2 G/DL (ref 11.5–16)
HGB UR QL STRIP: ABNORMAL
IMM GRANULOCYTES # BLD AUTO: 0.02 K/UL (ref 0–0.04)
IMM GRANULOCYTES NFR BLD AUTO: 0.5 % (ref 0–0.5)
KETONES UR QL STRIP.AUTO: NEGATIVE MG/DL
LEUKOCYTE ESTERASE UR QL STRIP.AUTO: ABNORMAL
LYMPHOCYTES # BLD: 0.84 K/UL (ref 0.8–3.5)
LYMPHOCYTES NFR BLD: 22.4 % (ref 12–49)
MCH RBC QN AUTO: 28.3 PG (ref 26–34)
MCHC RBC AUTO-ENTMCNC: 31.8 G/DL (ref 30–36.5)
MCV RBC AUTO: 89.2 FL (ref 80–99)
MONOCYTES # BLD: 0.24 K/UL (ref 0–1)
MONOCYTES NFR BLD: 6.4 % (ref 5–13)
NEUTS SEG # BLD: 2.51 K/UL (ref 1.8–8)
NEUTS SEG NFR BLD: 67 % (ref 32–75)
NITRITE UR QL STRIP.AUTO: NEGATIVE
NRBC # BLD: 0 K/UL (ref 0–0.01)
NRBC BLD-RTO: 0 PER 100 WBC
PH UR STRIP: 6 (ref 5–8)
PLATELET # BLD AUTO: 191 K/UL (ref 150–400)
PMV BLD AUTO: 10.2 FL (ref 8.9–12.9)
POTASSIUM SERPL-SCNC: 3.8 MMOL/L (ref 3.5–5.1)
PROT SERPL-MCNC: 6.8 G/DL (ref 6.4–8.2)
PROT UR STRIP-MCNC: NEGATIVE MG/DL
RBC # BLD AUTO: 3.6 M/UL (ref 3.8–5.2)
RBC #/AREA URNS HPF: ABNORMAL /HPF (ref 0–3)
SODIUM SERPL-SCNC: 140 MMOL/L (ref 136–145)
SP GR UR REFRACTOMETRY: <1.005 (ref 1–1.03)
TROPONIN I SERPL HS-MCNC: 28 NG/L (ref 0–51)
TROPONIN I SERPL HS-MCNC: 31 NG/L (ref 0–51)
UROBILINOGEN UR QL STRIP.AUTO: 0.2 EU/DL (ref 0.2–1)
WBC # BLD AUTO: 3.8 K/UL (ref 3.6–11)
WBC URNS QL MICRO: ABNORMAL /HPF (ref 0–5)

## 2025-08-04 PROCEDURE — 85025 COMPLETE CBC W/AUTO DIFF WBC: CPT

## 2025-08-04 PROCEDURE — 96374 THER/PROPH/DIAG INJ IV PUSH: CPT

## 2025-08-04 PROCEDURE — 80053 COMPREHEN METABOLIC PANEL: CPT

## 2025-08-04 PROCEDURE — 83880 ASSAY OF NATRIURETIC PEPTIDE: CPT

## 2025-08-04 PROCEDURE — 70450 CT HEAD/BRAIN W/O DYE: CPT

## 2025-08-04 PROCEDURE — 84484 ASSAY OF TROPONIN QUANT: CPT

## 2025-08-04 PROCEDURE — 93005 ELECTROCARDIOGRAM TRACING: CPT | Performed by: EMERGENCY MEDICINE

## 2025-08-04 PROCEDURE — 36415 COLL VENOUS BLD VENIPUNCTURE: CPT

## 2025-08-04 PROCEDURE — 6360000002 HC RX W HCPCS: Performed by: EMERGENCY MEDICINE

## 2025-08-04 PROCEDURE — 81001 URINALYSIS AUTO W/SCOPE: CPT

## 2025-08-04 PROCEDURE — 71250 CT THORAX DX C-: CPT

## 2025-08-04 PROCEDURE — 99284 EMERGENCY DEPT VISIT MOD MDM: CPT

## 2025-08-04 RX ORDER — KETOROLAC TROMETHAMINE 30 MG/ML
30 INJECTION, SOLUTION INTRAMUSCULAR; INTRAVENOUS
Status: COMPLETED | OUTPATIENT
Start: 2025-08-04 | End: 2025-08-04

## 2025-08-04 RX ADMIN — KETOROLAC TROMETHAMINE 30 MG: 30 INJECTION, SOLUTION INTRAMUSCULAR at 15:51

## 2025-08-04 ASSESSMENT — PAIN SCALES - GENERAL: PAINLEVEL_OUTOF10: 10

## 2025-08-04 ASSESSMENT — LIFESTYLE VARIABLES
HOW OFTEN DO YOU HAVE A DRINK CONTAINING ALCOHOL: NEVER
HOW MANY STANDARD DRINKS CONTAINING ALCOHOL DO YOU HAVE ON A TYPICAL DAY: PATIENT DOES NOT DRINK

## 2025-08-04 ASSESSMENT — PAIN - FUNCTIONAL ASSESSMENT: PAIN_FUNCTIONAL_ASSESSMENT: 0-10

## 2025-08-05 LAB
EKG ATRIAL RATE: 62 BPM
EKG DIAGNOSIS: NORMAL
EKG P AXIS: 31 DEGREES
EKG P-R INTERVAL: 178 MS
EKG Q-T INTERVAL: 442 MS
EKG QRS DURATION: 91 MS
EKG QTC CALCULATION (BAZETT): 449 MS
EKG R AXIS: 19 DEGREES
EKG T AXIS: 15 DEGREES
EKG VENTRICULAR RATE: 62 BPM

## 2025-08-14 ENCOUNTER — ANESTHESIA (OUTPATIENT)
Facility: HOSPITAL | Age: 70
End: 2025-08-14
Payer: MEDICARE

## 2025-08-14 ENCOUNTER — ANESTHESIA EVENT (OUTPATIENT)
Facility: HOSPITAL | Age: 70
End: 2025-08-14
Payer: MEDICARE

## 2025-08-14 ENCOUNTER — HOSPITAL ENCOUNTER (OUTPATIENT)
Facility: HOSPITAL | Age: 70
Setting detail: OUTPATIENT SURGERY
Discharge: HOME | End: 2025-08-14
Attending: INTERNAL MEDICINE | Admitting: INTERNAL MEDICINE
Payer: MEDICARE

## 2025-08-14 VITALS
TEMPERATURE: 97.5 F | WEIGHT: 174.6 LBS | HEART RATE: 68 BPM | RESPIRATION RATE: 18 BRPM | DIASTOLIC BLOOD PRESSURE: 73 MMHG | SYSTOLIC BLOOD PRESSURE: 150 MMHG | BODY MASS INDEX: 28.06 KG/M2 | HEIGHT: 66 IN | OXYGEN SATURATION: 99 %

## 2025-08-14 PROBLEM — Z87.19 HISTORY OF GASTRIC POLYP: Status: ACTIVE | Noted: 2025-08-14

## 2025-08-14 LAB
GLUCOSE BLD STRIP.AUTO-MCNC: 93 MG/DL (ref 65–100)
PERFORMED BY:: NORMAL

## 2025-08-14 PROCEDURE — 3700000000 HC ANESTHESIA ATTENDED CARE: Performed by: INTERNAL MEDICINE

## 2025-08-14 PROCEDURE — APPNB15 APP NON BILLABLE TIME 0-15 MINS: Performed by: INTERNAL MEDICINE

## 2025-08-14 PROCEDURE — 88305 TISSUE EXAM BY PATHOLOGIST: CPT

## 2025-08-14 PROCEDURE — 82962 GLUCOSE BLOOD TEST: CPT

## 2025-08-14 PROCEDURE — 7100000010 HC PHASE II RECOVERY - FIRST 15 MIN: Performed by: INTERNAL MEDICINE

## 2025-08-14 PROCEDURE — 3600007502: Performed by: INTERNAL MEDICINE

## 2025-08-14 PROCEDURE — 43239 EGD BIOPSY SINGLE/MULTIPLE: CPT | Performed by: INTERNAL MEDICINE

## 2025-08-14 PROCEDURE — 3700000001 HC ADD 15 MINUTES (ANESTHESIA): Performed by: INTERNAL MEDICINE

## 2025-08-14 PROCEDURE — 2709999900 HC NON-CHARGEABLE SUPPLY: Performed by: INTERNAL MEDICINE

## 2025-08-14 PROCEDURE — 2580000003 HC RX 258: Performed by: INTERNAL MEDICINE

## 2025-08-14 PROCEDURE — 3600007512: Performed by: INTERNAL MEDICINE

## 2025-08-14 PROCEDURE — 6360000002 HC RX W HCPCS: Performed by: NURSE ANESTHETIST, CERTIFIED REGISTERED

## 2025-08-14 PROCEDURE — 7100000011 HC PHASE II RECOVERY - ADDTL 15 MIN: Performed by: INTERNAL MEDICINE

## 2025-08-14 RX ORDER — PROPOFOL 10 MG/ML
INJECTION, EMULSION INTRAVENOUS
Status: DISCONTINUED | OUTPATIENT
Start: 2025-08-14 | End: 2025-08-14 | Stop reason: SDUPTHER

## 2025-08-14 RX ORDER — SODIUM CHLORIDE 9 MG/ML
INJECTION, SOLUTION INTRAVENOUS CONTINUOUS
Status: DISCONTINUED | OUTPATIENT
Start: 2025-08-14 | End: 2025-08-14 | Stop reason: HOSPADM

## 2025-08-14 RX ADMIN — PROPOFOL 100 MG: 10 INJECTION, EMULSION INTRAVENOUS at 12:27

## 2025-08-14 RX ADMIN — SODIUM CHLORIDE: 0.9 INJECTION, SOLUTION INTRAVENOUS at 10:59

## 2025-08-14 ASSESSMENT — PAIN - FUNCTIONAL ASSESSMENT
PAIN_FUNCTIONAL_ASSESSMENT: 0-10

## 2025-08-22 ENCOUNTER — TRANSCRIBE ORDERS (OUTPATIENT)
Facility: HOSPITAL | Age: 70
End: 2025-08-22

## 2025-08-22 DIAGNOSIS — D73.4 CYST OF SPLEEN: Primary | ICD-10-CM

## 2025-09-03 ENCOUNTER — HOSPITAL ENCOUNTER (OUTPATIENT)
Facility: HOSPITAL | Age: 70
Discharge: HOME | End: 2025-09-06
Payer: MEDICARE

## 2025-09-03 DIAGNOSIS — D73.4 CYST OF SPLEEN: ICD-10-CM

## 2025-09-03 PROCEDURE — 76700 US EXAM ABDOM COMPLETE: CPT

## (undated) DEVICE — GUIDEWIRE VASC L260CM DIA0.035IN TIP L3MM STD EXCHG PTFE J

## (undated) DEVICE — PERCUTANEOUS ENTRY THINWALL NEEDLE  ONE-PART: Brand: COOK

## (undated) DEVICE — THE ENDO CARRY-ON PROCEDURE KIT CONTAINS ALL OF THE SUPPLIES AND INFECTION PREVENTION PRODUCTS NEEDED FOR ENDOSCOPIC PROCEDURES: Brand: ENDO CARRY-ON PROCEDURE KIT

## (undated) DEVICE — WASTEBAG DRIP/ADAPTER: Brand: MEDLINE INDUSTRIES, INC.

## (undated) DEVICE — RADIFOCUS GLIDEWIRE ADVANTAGE GUIDEWIRE: Brand: GLIDEWIRE ADVANTAGE

## (undated) DEVICE — CLIP HEMO L235CM WRK CHN DIA OPN 17MM BRAID CATH ROT

## (undated) DEVICE — FORCEPS BX L240CM WRK CHN 2.8MM STD CAP W/ NDL MIC MESH

## (undated) DEVICE — CATH BLLN ANGIO 2.50X6MM NC EUPHORIA RX

## (undated) DEVICE — COPE MANDRIL WIRE GUIDE STAINLESS STEEL: Brand: COPE

## (undated) DEVICE — GUIDE CATH CONVEY 5FR JL3.5 -- 39228-661

## (undated) DEVICE — GUIDE CATH CONVEY 5FR JR4 -- 39228-686

## (undated) DEVICE — Device

## (undated) DEVICE — Z DUPLICATE USE 2103554 VALVE HEMOSTATIC BLEEDBK CTRL COPILOT

## (undated) DEVICE — TUBING HYDR IRR --

## (undated) DEVICE — BOWL UTIL 16OZ STRL --

## (undated) DEVICE — GLOVE ORANGE PI 7 1/2   MSG9075

## (undated) DEVICE — 3M™ TEGADERM™ TRANSPARENT FILM DRESSING FRAME STYLE, 1626W, 4 IN X 4-3/4 IN (10 CM X 12 CM), 50/CT 4CT/CASE: Brand: 3M™ TEGADERM™

## (undated) DEVICE — DEVICE TORQ FLRESCNT PNK FOR HEMSTAS VLV

## (undated) DEVICE — FCPS RAD JAW 4LC 240CM W/NDL -- BX/20 RADIAL JAW 4

## (undated) DEVICE — CANISTER SUCT 1200CC 90DEG ADPT HRD LOK LID AUTO OVERFLOW

## (undated) DEVICE — LINE SAMP LNG AD ORAL NSL PT O2 TBNG SMRT CAPNOLN H +

## (undated) DEVICE — DEVICE COMPR REG 24 CM VASC BND

## (undated) DEVICE — GLIDESHEATH SLENDER ACCESS KIT: Brand: GLIDESHEATH SLENDER

## (undated) DEVICE — RUNTHROUGH NS EXTRA FLOPPY PTCA GUIDEWIRE: Brand: RUNTHROUGH

## (undated) DEVICE — TOOL INSRT ANGI GDWIRE MTL SS --

## (undated) DEVICE — SURGICAL PROCEDURE TRAY CRD CATH 3 PRT

## (undated) DEVICE — 3M™ STERI-DRAPE™ SMALL DRAPE WITH ADHESIVE APERTURE 1092 25/BX,4/CS&#X20;: Brand: STERI-DRAPE™

## (undated) DEVICE — SYRINGE ANGIO 20ML WHT POLYCARB VAC PRSS CAP PLUNG FIX M

## (undated) DEVICE — CATHETER GUID 6FR L100CM GRN PTFE JR4 TRUELUMEN HYBRID

## (undated) DEVICE — LUBRICANT EXAM 4OZ GREASELESS H2O SOL JELLY FRM A TRNSLUC

## (undated) DEVICE — SYRINGE MED 10ML RED POLYCARB BRL FIX M LUER CONN FLAT GRP

## (undated) DEVICE — TUBING PRESS INJ FLX SH 30IN --

## (undated) DEVICE — CATH BLLN DIL 2.25X10MM RX -- EUPHORA

## (undated) DEVICE — TIP SUCTION TRANSPAR RIB SURF STD BLB RIG  YANKAUER

## (undated) DEVICE — CLIP RESOLUTION 360

## (undated) DEVICE — SPONGE GZ W4XL4IN COT 12 PLY TYP VII WVN C FLD DSGN STERILE

## (undated) DEVICE — SINGLE-USE POLYPECTOMY SNARE: Brand: CAPTIFLEX

## (undated) DEVICE — DEVICE INFL SYR BLLN ENDO 30 -- INTELLI

## (undated) DEVICE — PTCA DILATATION CATHETER: Brand: EMERGE™

## (undated) DEVICE — MASK POM PROCEDURE OXY W/ HI CONCENTRATION CO2 MONITOR

## (undated) DEVICE — CATHETER ANGIO JR4 PIG STD AD 4 FRX110 CM MP QUIK CARE INFIN

## (undated) DEVICE — TUBING SCTION CONN 9/32X10 RIB

## (undated) DEVICE — PRESSURE TUBING: Brand: TRUWAVE

## (undated) DEVICE — LULU RADIAL/FEMORAL ANGIOGRAPHY SHEET: Brand: CONVERTORS

## (undated) DEVICE — SINGLE USE LIGATING DEVICE: Brand: SINGLE USE LIGATING DEVICE

## (undated) DEVICE — PAD PREP W24XL48IN SURG INCREDIBLE FLD REPELLENCY

## (undated) DEVICE — CATH BLLN ANGIO 2.75X10MM SC EUPHORA RX

## (undated) DEVICE — SOLUTION IRRIG 1000ML H2O PIC PLAS SHATTERPROOF CONTAINER

## (undated) DEVICE — MASK ANES INF SZ 2 PREM TAIL VLV INFL PRT UNSCENTED SGL PT

## (undated) DEVICE — LUER-LOK 360°: Brand: CONNECTA, LUER-LOK